# Patient Record
Sex: FEMALE | Race: WHITE | NOT HISPANIC OR LATINO | Employment: OTHER | URBAN - METROPOLITAN AREA
[De-identification: names, ages, dates, MRNs, and addresses within clinical notes are randomized per-mention and may not be internally consistent; named-entity substitution may affect disease eponyms.]

---

## 2018-08-03 ENCOUNTER — OFFICE VISIT (OUTPATIENT)
Dept: UROLOGY | Facility: CLINIC | Age: 53
End: 2018-08-03
Payer: MEDICARE

## 2018-08-03 VITALS
SYSTOLIC BLOOD PRESSURE: 108 MMHG | BODY MASS INDEX: 29.45 KG/M2 | DIASTOLIC BLOOD PRESSURE: 80 MMHG | HEIGHT: 60 IN | HEART RATE: 58 BPM | WEIGHT: 150 LBS

## 2018-08-03 DIAGNOSIS — R32 URINARY INCONTINENCE, UNSPECIFIED TYPE: ICD-10-CM

## 2018-08-03 DIAGNOSIS — N31.9 NEUROMUSCULAR DYSFUNCTION OF BLADDER: Primary | ICD-10-CM

## 2018-08-03 DIAGNOSIS — N39.0 RECURRENT UTI: ICD-10-CM

## 2018-08-03 PROCEDURE — 99204 OFFICE O/P NEW MOD 45 MIN: CPT | Performed by: UROLOGY

## 2018-08-03 RX ORDER — LEVOTHYROXINE SODIUM 0.15 MG/1
TABLET ORAL
Refills: 1 | COMMUNITY
Start: 2018-05-18 | End: 2020-04-30 | Stop reason: SDUPTHER

## 2018-08-03 RX ORDER — LANSOPRAZOLE 30 MG/1
30 CAPSULE, DELAYED RELEASE ORAL DAILY
Refills: 11 | COMMUNITY
Start: 2018-04-27 | End: 2020-06-18

## 2018-08-03 RX ORDER — MULTIVITAMIN WITH IRON
TABLET ORAL DAILY
COMMUNITY
End: 2021-09-13

## 2018-08-03 RX ORDER — ASCORBIC ACID 500 MG
2000 TABLET ORAL
COMMUNITY
End: 2021-09-13

## 2018-08-03 RX ORDER — NITROFURANTOIN MACROCRYSTALS 50 MG/1
50 CAPSULE ORAL
Qty: 90 CAPSULE | Refills: 3 | Status: SHIPPED | OUTPATIENT
Start: 2018-08-03 | End: 2020-04-20

## 2018-08-03 RX ORDER — ASPIRIN 81 MG/1
81 TABLET ORAL DAILY
COMMUNITY
End: 2020-04-20

## 2018-08-03 RX ORDER — LINACLOTIDE 145 UG/1
1 CAPSULE, GELATIN COATED ORAL DAILY
Refills: 11 | COMMUNITY
Start: 2018-04-27 | End: 2020-06-08

## 2018-08-03 NOTE — PROGRESS NOTES
Referring Physician: Selina Gomez MD  A copy of this note was sent to the referring physician  Diagnoses and all orders for this visit:    Neuromuscular dysfunction of bladder    Recurrent UTI  -     nitrofurantoin (MACRODANTIN) 50 mg capsule; Take 1 capsule (50 mg total) by mouth daily at bedtime    Urinary incontinence, unspecified type    Other orders  -     lansoprazole (PREVACID) 30 mg capsule; Take 30 mg by mouth daily  -     levothyroxine 150 mcg tablet; TAKE 1 TABLET(S) EVERY DAY BY ORAL ROUTE FOR 30 DAYS  -     LINZESS 145 MCG CAPS; Take 1 capsule by mouth daily  -     aspirin (ECOTRIN LOW STRENGTH) 81 mg EC tablet; Take 81 mg by mouth daily  -     Vitamin E 100 UNIT/GM CREA; Apply 2,000 Units topically  -     Garlic 5690 MG CAPS; Take by mouth  -     vitamin B-12 (CYANOCOBALAMIN) 50 MCG tablet; Take by mouth daily            Assessment and plan:       1  Recurrent uti  - on macrodantin suppression  - doing well    2  Neurogenic bladder secondary to developmental delay      Patient appears to be doing fairly well from a urologic perspective  She has been on Macrodantin suppression for number of years and this is working well for her  She only needed to be treated with a fluoroquinolone twice in the past calendar year for 3 day course and appeared to respond well  I have renewed her Macrodantin  Since it is difficult for the patient to be diagnosed with the urinary tract infection clinically we will hold off on any self start therapy though I did discuss this with Majo Roberson is mother who was clearly a from nominal caregiver    We will see her back in a year or sooner if needed      Bonnie Dutton MD      Chief Complaint     Transfer of care recurrent UTIs      History of Present Illness     Kassy Gutierres is a 47 y o  female with a history of developmental delay, neurogenic bladder recurrent urinary tract infection  She was previous under the care of Dr Prince Gomez for many years    I reviewed approximately 100 pages of medical records and summarized here    In brief she has a history of neurogenic bladder secondary to developmental delay  She is incontinent of urine  She has been evaluated with prior postvoid residual ultrasound, CT scans as well  He has not required any catheterization regimen  She has had trouble with recurrent cystitis  She also has what sounds like a history of asymptomatic bacteriuria  She has been on Macrodantin suppression for a number years and is doing very well  She had 2 symptomatic UTIs last year both of which were treated successfully with 3 days of ciprofloxacin  Detailed Urologic History     - please refer to HPI    Review of Systems     Review of Systems   Constitutional: Negative for activity change and fatigue  HENT: Negative for congestion  Eyes: Negative for visual disturbance  Respiratory: Negative for shortness of breath and wheezing  Cardiovascular: Negative for chest pain and leg swelling  Gastrointestinal: Negative for abdominal pain  Endocrine: Negative for polyuria  Genitourinary: Negative for dysuria, flank pain, hematuria and urgency  Musculoskeletal: Negative for back pain  Allergic/Immunologic: Negative for immunocompromised state  Neurological: Negative for dizziness and numbness  Psychiatric/Behavioral: Negative for dysphoric mood  All other systems reviewed and are negative  Allergies     Allergies   Allergen Reactions    Bethanechol      Reaction Date: 10Dec2007;     Metoclopramide      Reaction Date: 10Dec2007;     Sulfamethoxazole-Trimethoprim      Reaction Date: 10Dec2007;        Physical Exam     Physical Exam   Constitutional: She is oriented to person, place, and time  She appears well-developed  HENT:   Head: Normocephalic and atraumatic  Eyes: Pupils are equal, round, and reactive to light  Neck: Normal range of motion     Cardiovascular:   Negative lower extremity edema Pulmonary/Chest: Effort normal and breath sounds normal    Abdominal: Soft  Genitourinary:   Genitourinary Comments: Negative suprapubic tenderness, bladder nonpalpable   Musculoskeletal: Normal range of motion  Neurological: She is alert and oriented to person, place, and time  Skin: Skin is warm  Psychiatric: She has a normal mood and affect             Vital Signs  Vitals:    08/03/18 1037   BP: 108/80   BP Location: Right arm   Patient Position: Sitting   Cuff Size: Adult   Pulse: 58   Weight: 68 kg (150 lb)   Height: 5' (1 524 m)         Current Medications       Current Outpatient Prescriptions:     amLODIPine-valsartan (EXFORGE)  MG per tablet, Take by mouth daily, Disp: , Rfl:     aspirin (ECOTRIN LOW STRENGTH) 81 mg EC tablet, Take 81 mg by mouth daily, Disp: , Rfl:     Garlic 7392 MG CAPS, Take by mouth, Disp: , Rfl:     lansoprazole (PREVACID SOLUTAB) 30 mg disintegrating tablet, Take by mouth, Disp: , Rfl:     lansoprazole (PREVACID) 30 mg capsule, Take 30 mg by mouth daily, Disp: , Rfl: 11    levothyroxine 125 mcg tablet, Take 1 tablet by mouth daily, Disp: , Rfl:     levothyroxine 150 mcg tablet, TAKE 1 TABLET(S) EVERY DAY BY ORAL ROUTE FOR 30 DAYS , Disp: , Rfl: 1    LINZESS 145 MCG CAPS, Take 1 capsule by mouth daily, Disp: , Rfl: 11    metoprolol tartrate (LOPRESSOR) 25 mg tablet, Take by mouth daily, Disp: , Rfl:     montelukast (SINGULAIR) 10 mg tablet, Take by mouth daily, Disp: , Rfl:     nitrofurantoin (MACRODANTIN) 50 mg capsule, Take by mouth, Disp: , Rfl:     valsartan (DIOVAN) 320 MG tablet, Take 320 mg by mouth daily, Disp: , Rfl: 3    vitamin B-12 (CYANOCOBALAMIN) 50 MCG tablet, Take by mouth daily, Disp: , Rfl:     Vitamin E 100 UNIT/GM CREA, Apply 2,000 Units topically, Disp: , Rfl:       Active Problems     Patient Active Problem List   Diagnosis    Neuromuscular dysfunction of bladder         Past Medical History     Past Medical History:   Diagnosis Date  Blood pressure instability     Blood pressure instability UNKNOWN    Hypertension     Kidney stone     Stroke ([facility] [address] )     UNCERTAIN OF TYPE/NON VERBAL    Urinary incontinence     Urinary tract infection          Surgical History     Past Surgical History:   Procedure Laterality Date    ABDOMINAL HYSTERECTOMY      HYSTERECTOMY      HYSTERECTOMY      SMALL INTESTINE SURGERY           Family History     Family History   Problem Relation Age of Onset    Cancer Father     Colon cancer Father     Asthma Mother     Stroke Mother          Social History     Social History     History   Smoking Status    Never Smoker   Smokeless Tobacco    Never Used         Pertinent Lab Values     No results found for: CREATININE    No results found for: PSA    @RESULTRCNT(1H])@      Pertinent Imaging      - n/a    Portions of the record may have been created with voice recognition software   Occasional wrong word or "sound a like" substitutions may have occurred due to the inherent limitations of voice recognition software   Read the chart carefully and recognize, using context, where substitutions have occurred

## 2018-08-03 NOTE — LETTER
August 3, 2018     Nely Coleman MD  [address] [facility] [place] [address]    Patient: Kurt Kothari   YOB: 1965   Date of Visit: 8/3/2018       Dear Dr Desean Hebert: Thank you for referring Carlos Lopez to me for evaluation  Below are my notes for this consultation  If you have questions, please do not hesitate to call me  I look forward to following your patient along with you  Sincerely,        Neyda Rivero MD        CC: No Recipients  Neyda Rivero MD  8/3/2018 10:55 AM  Sign at close encounter  Referring Physician: Nely Coleman MD  A copy of this note was sent to the referring physician  Diagnoses and all orders for this visit:    Neuromuscular dysfunction of bladder    Recurrent UTI  -     nitrofurantoin (MACRODANTIN) 50 mg capsule; Take 1 capsule (50 mg total) by mouth daily at bedtime    Urinary incontinence, unspecified type    Other orders  -     lansoprazole (PREVACID) 30 mg capsule; Take 30 mg by mouth daily  -     levothyroxine 150 mcg tablet; TAKE 1 TABLET(S) EVERY DAY BY ORAL ROUTE FOR 30 DAYS  -     LINZESS 145 MCG CAPS; Take 1 capsule by mouth daily  -     aspirin (ECOTRIN LOW STRENGTH) 81 mg EC tablet; Take 81 mg by mouth daily  -     Vitamin E 100 UNIT/GM CREA; Apply 2,000 Units topically  -     Garlic 6584 MG CAPS; Take by mouth  -     vitamin B-12 (CYANOCOBALAMIN) 50 MCG tablet; Take by mouth daily            Assessment and plan:       1  Recurrent uti  - on macrodantin suppression  - doing well    2  Neurogenic bladder secondary to developmental delay      Patient appears to be doing fairly well from a urologic perspective  She has been on Macrodantin suppression for number of years and this is working well for her  She only needed to be treated with a fluoroquinolone twice in the past calendar year for 3 day course and appeared to respond well  I have renewed her Macrodantin    Since it is difficult for the patient to be diagnosed with the urinary tract infection clinically we will hold off on any self start therapy though I did discuss this with Kary Res is mother who was clearly a from nominal caregiver    We will see her back in a year or sooner if needed      Nicanor Melendez MD      Chief Complaint     Transfer of care recurrent UTIs      History of Present Illness     Wisam Alas is a 47 y o  female with a history of developmental delay, neurogenic bladder recurrent urinary tract infection  She was previous under the care of Dr Gracie Rodarte for many years  I reviewed approximately 100 pages of medical records and summarized here    In brief she has a history of neurogenic bladder secondary to developmental delay  She is incontinent of urine  She has been evaluated with prior postvoid residual ultrasound, CT scans as well  He has not required any catheterization regimen  She has had trouble with recurrent cystitis  She also has what sounds like a history of asymptomatic bacteriuria  She has been on Macrodantin suppression for a number years and is doing very well  She had 2 symptomatic UTIs last year both of which were treated successfully with 3 days of ciprofloxacin  Detailed Urologic History     - please refer to HPI    Review of Systems     Review of Systems   Constitutional: Negative for activity change and fatigue  HENT: Negative for congestion  Eyes: Negative for visual disturbance  Respiratory: Negative for shortness of breath and wheezing  Cardiovascular: Negative for chest pain and leg swelling  Gastrointestinal: Negative for abdominal pain  Endocrine: Negative for polyuria  Genitourinary: Negative for dysuria, flank pain, hematuria and urgency  Musculoskeletal: Negative for back pain  Allergic/Immunologic: Negative for immunocompromised state  Neurological: Negative for dizziness and numbness  Psychiatric/Behavioral: Negative for dysphoric mood     All other systems reviewed and are negative  Allergies     Allergies   Allergen Reactions    Bethanechol      Reaction Date: 10Dec2007;     Metoclopramide      Reaction Date: 10Dec2007;     Sulfamethoxazole-Trimethoprim      Reaction Date: 10Dec2007;        Physical Exam     Physical Exam   Constitutional: She is oriented to person, place, and time  She appears well-developed  HENT:   Head: Normocephalic and atraumatic  Eyes: Pupils are equal, round, and reactive to light  Neck: Normal range of motion  Cardiovascular:   Negative lower extremity edema   Pulmonary/Chest: Effort normal and breath sounds normal    Abdominal: Soft  Genitourinary:   Genitourinary Comments: Negative suprapubic tenderness, bladder nonpalpable   Musculoskeletal: Normal range of motion  Neurological: She is alert and oriented to person, place, and time  Skin: Skin is warm  Psychiatric: She has a normal mood and affect             Vital Signs  Vitals:    08/03/18 1037   BP: 108/80   BP Location: Right arm   Patient Position: Sitting   Cuff Size: Adult   Pulse: 58   Weight: 68 kg (150 lb)   Height: 5' (1 524 m)         Current Medications       Current Outpatient Prescriptions:     amLODIPine-valsartan (EXFORGE)  MG per tablet, Take by mouth daily, Disp: , Rfl:     aspirin (ECOTRIN LOW STRENGTH) 81 mg EC tablet, Take 81 mg by mouth daily, Disp: , Rfl:     Garlic 3314 MG CAPS, Take by mouth, Disp: , Rfl:     lansoprazole (PREVACID SOLUTAB) 30 mg disintegrating tablet, Take by mouth, Disp: , Rfl:     lansoprazole (PREVACID) 30 mg capsule, Take 30 mg by mouth daily, Disp: , Rfl: 11    levothyroxine 125 mcg tablet, Take 1 tablet by mouth daily, Disp: , Rfl:     levothyroxine 150 mcg tablet, TAKE 1 TABLET(S) EVERY DAY BY ORAL ROUTE FOR 30 DAYS , Disp: , Rfl: 1    LINZESS 145 MCG CAPS, Take 1 capsule by mouth daily, Disp: , Rfl: 11    metoprolol tartrate (LOPRESSOR) 25 mg tablet, Take by mouth daily, Disp: , Rfl:     montelukast (SINGULAIR) 10 mg tablet, Take by mouth daily, Disp: , Rfl:     nitrofurantoin (MACRODANTIN) 50 mg capsule, Take by mouth, Disp: , Rfl:     valsartan (DIOVAN) 320 MG tablet, Take 320 mg by mouth daily, Disp: , Rfl: 3    vitamin B-12 (CYANOCOBALAMIN) 50 MCG tablet, Take by mouth daily, Disp: , Rfl:     Vitamin E 100 UNIT/GM CREA, Apply 2,000 Units topically, Disp: , Rfl:       Active Problems     Patient Active Problem List   Diagnosis    Neuromuscular dysfunction of bladder         Past Medical History     Past Medical History:   Diagnosis Date    Blood pressure instability     Blood pressure instability UNKNOWN    Hypertension     Kidney stone     Stroke ([facility] [address] )     UNCERTAIN OF TYPE/NON VERBAL    Urinary incontinence     Urinary tract infection          Surgical History     Past Surgical History:   Procedure Laterality Date    ABDOMINAL HYSTERECTOMY      HYSTERECTOMY      HYSTERECTOMY      SMALL INTESTINE SURGERY           Family History     Family History   Problem Relation Age of Onset    Cancer Father     Colon cancer Father     Asthma Mother     Stroke Mother          Social History     Social History     History   Smoking Status    Never Smoker   Smokeless Tobacco    Never Used         Pertinent Lab Values     No results found for: CREATININE    No results found for: PSA    @RESULTRCNT(1H])@      Pertinent Imaging      - n/a    Portions of the record may have been created with voice recognition software   Occasional wrong word or "sound a like" substitutions may have occurred due to the inherent limitations of voice recognition software   Read the chart carefully and recognize, using context, where substitutions have occurred

## 2018-09-11 ENCOUNTER — TELEPHONE (OUTPATIENT)
Dept: UROLOGY | Facility: CLINIC | Age: 53
End: 2018-09-11

## 2018-09-11 NOTE — TELEPHONE ENCOUNTER
Returned call from mother, Charles Louis, who cares for patient,  Patient managed by Dr Raeann Murphy office  History of neurogenic bladder secondary to developmental delay, recurrent UTI  Mom states patient's daily Nitrofurantoin 50 mg needs prior authorization with [facility] phone [phone] with rational for antibiotic and diagnosis  Prescription is filled at [address]  Advised mother that message will be sent to our prior authorization and that she will be contacted after response from insurance

## 2018-09-13 NOTE — TELEPHONE ENCOUNTER
Iván Iglesias  This patients medication was approved with the insurance good until 9/12/19, I will call the mother to let her know    Thanks  Damien Mccall

## 2020-03-16 ENCOUNTER — OFFICE VISIT (OUTPATIENT)
Dept: OBGYN CLINIC | Facility: MEDICAL CENTER | Age: 55
End: 2020-03-16
Payer: MEDICARE

## 2020-03-16 VITALS
BODY MASS INDEX: 26.9 KG/M2 | WEIGHT: 137 LBS | HEIGHT: 60 IN | DIASTOLIC BLOOD PRESSURE: 74 MMHG | SYSTOLIC BLOOD PRESSURE: 120 MMHG

## 2020-03-16 DIAGNOSIS — N76.0 ACUTE VAGINITIS: Primary | ICD-10-CM

## 2020-03-16 PROCEDURE — 87210 SMEAR WET MOUNT SALINE/INK: CPT | Performed by: NURSE PRACTITIONER

## 2020-03-16 PROCEDURE — 99214 OFFICE O/P EST MOD 30 MIN: CPT | Performed by: NURSE PRACTITIONER

## 2020-03-16 RX ORDER — CLOTRIMAZOLE AND BETAMETHASONE DIPROPIONATE 10; .64 MG/G; MG/G
CREAM TOPICAL 2 TIMES DAILY
Qty: 30 G | Refills: 0 | Status: SHIPPED | OUTPATIENT
Start: 2020-03-16 | End: 2020-06-19

## 2020-03-16 RX ORDER — FLUCONAZOLE 150 MG/1
TABLET ORAL
Qty: 1 TABLET | Refills: 0 | Status: SHIPPED | OUTPATIENT
Start: 2020-03-16 | End: 2020-03-16

## 2020-03-16 NOTE — PATIENT INSTRUCTIONS
Vaginitis   WHAT YOU NEED TO KNOW:   What is vaginitis? Vaginitis is an inflammation or infection of the vagina  What causes vaginitis? Vaginitis is usually caused by bacteria, a virus, or a fungus  The chemicals in bubble baths, soaps, and perfumes can also cause vaginitis  A foreign body inside the vagina can also cause vaginitis  The infection may spread through sexual activity  It can also pass to a baby during birth  What increases my risk for vaginitis? · Diabetes mellitus that is not controlled    · Antibiotics, such as those used to treat fungal vaginitis     · Weakened immune system    · High estrogen levels, such as during pregnancy or from birth control pills    · Smoking    · New or multiple sex partners     · Sexual abuse    · Incorrect care of vagina, such as not wiping from front to back    · Use of spermicide or douche  What are the signs and symptoms of vaginitis? · Tenderness, itching, redness, and swelling     · Foul-smelling odor     · Thick, curd-like discharge     · Thin, gray-white discharge    · Small skin tears or chafing    · Painful sexual intercourse    · Pain when you urinate  How is vaginitis diagnosed? Your healthcare provider will ask about your signs and symptoms and examine you  A sample of discharge from your vagina will be tested for infection  How is vaginitis treated? · Antifungals  are used to treat a fungal infection  They may be given as a cream, gel, or tablet you insert into your vagina  · Antibiotics  are used to fight an infection caused by bacteria  What are the risks of vaginitis? Your infection may return  Left untreated, the bacteria or virus can spread  This can damage organs, such as your fallopian tubes  The infection can spread to your sexual partner if you do not have safe sex  The infection may lead to pregnancy complications, such as  birth or pelvic inflammatory disease  How can I manage my vaginitis?    · Wash your vagina  with mild soap and warm water each day  Gently dry the area after washing  · Do not douche  or insert other irritating products into your vagina  · Do not wear  tight-fitting clothes or undergarments  These can make your symptoms worse  · Do not have sex until your symptoms go away  When you have sex, always use a condom  Condoms can help protect you from contact with fluids from your partner that may be causing your vaginitis  How can I prevent vaginitis? · Wipe from front to back  after you urinate  · Do not use irritating products such as bubble baths or perfumed soaps  When should I contact my healthcare provider? · You have a fever  · You have abdominal pain  · Your symptoms get worse, even after treatment  · Your symptoms return  · You have questions or concerns about your condition or care  When should I seek immediate care? · You have unusual vaginal bleeding  · You have severe abdominal pain  CARE AGREEMENT:   You have the right to help plan your care  Learn about your health condition and how it may be treated  Discuss treatment options with your caregivers to decide what care you want to receive  You always have the right to refuse treatment  The above information is an  only  It is not intended as medical advice for individual conditions or treatments  Talk to your doctor, nurse or pharmacist before following any medical regimen to see if it is safe and effective for you  © 2017 [address] Information is for End User's use only and may not be sold, redistributed or otherwise used for commercial purposes  All illustrations and images included in CareNotes® are the copyrighted property of A D A M , Inc  or [employer]

## 2020-03-17 PROBLEM — N76.0 ACUTE VAGINITIS: Status: ACTIVE | Noted: 2020-03-17

## 2020-03-17 LAB
BV WHIFF TEST VAG QL: NEGATIVE
CLUE CELLS SPEC QL WET PREP: NEGATIVE
PH SMN: 4 [PH]
SL AMB POCT WET MOUNT: ABNORMAL
T VAGINALIS VAG QL WET PREP: NEGATIVE
YEAST VAG QL WET PREP: POSITIVE

## 2020-03-17 NOTE — ASSESSMENT & PLAN NOTE
Exam consistent with resolving vaginitis-candidiasis  Recommended lostrisone cream, conservative vulvar hygiene, leaving area open to air and diflucan tablet if does not resolve  Discussed common triggers and what to avoid with her mom  Reviewed sx to report and reasons to call

## 2020-03-17 NOTE — PROGRESS NOTES
Assessment/Plan:    Acute vaginitis  Exam consistent with resolving vaginitis-candidiasis  Recommended lostrisone cream, conservative vulvar hygiene, leaving area open to air and diflucan tablet if does not resolve  Discussed common triggers and what to avoid with her mom  Reviewed sx to report and reasons to call  Advised mom that her treatment appears to be working  She should use mild soap and water only-no scrubbing of skin and to leave open to air when possible  Diagnoses and all orders for this visit:    Acute vaginitis  -     POCT wet mount  -     clotrimazole-betamethasone (LOTRISONE) 1-0 05 % cream; Apply topically 2 (two) times a day  -     fluconazole (DIFLUCAN) 150 mg tablet; Take one tablet po today and repeat 3 days        Subjective:      Patient ID: Gael Vieyra is a 53 y o  female  Margarita Alfredo is a 53year old nonverbal patient who is brought in by her mother for evaluation of external redness, pain and bleeding of skin  She presents in a wheelchair  She used adult diapers and mom uses plastic pants on top  Mom states she was recently seen at urology for straight cath and when they obtained the nurse notices that she was very red and it looks like she had a yeast infection  Per mom, Margarita Alfredo has never been sexually active and she does not notice any unusual discharge  Mom notices some bleeding from the skin  Mom does report that Margarita Alfredo appeared uncomfortable with urination  She was treated for UTI  She began using OTC monistat and Desitin with improvement of the redness  Has not noticed bleeding from skin in a couple of days          The following portions of the patient's history were reviewed and updated as appropriate: allergies, current medications, past family history, past medical history, past social history, past surgical history and problem list     Review of Systems   Unable to perform ROS: Patient nonverbal         Objective:      /74 (BP Location: Right arm, Patient Position: Sitting, Cuff Size: Standard)   Ht 5' (1 524 m)   Wt 62 1 kg (137 lb)   BMI 26 76 kg/m²          Physical Exam   Genitourinary: There is no rash, tenderness or lesion on the right labia  There is no rash, tenderness or lesion on the left labia  Genitourinary Comments: Wet prep done via single Q tip in vagina  External genitalia with slight redness, no lesions or bleeding    Skin: Skin is warm and dry  Capillary refill takes less than 2 seconds         Wet prep + yeast, neg clue, trich, whiff Ph 4 0

## 2020-04-06 ENCOUNTER — TELEMEDICINE (OUTPATIENT)
Dept: FAMILY MEDICINE CLINIC | Facility: CLINIC | Age: 55
End: 2020-04-06
Payer: MEDICARE

## 2020-04-06 DIAGNOSIS — M54.50 LOW BACK PAIN WITHOUT SCIATICA, UNSPECIFIED BACK PAIN LATERALITY, UNSPECIFIED CHRONICITY: Primary | ICD-10-CM

## 2020-04-06 PROCEDURE — 99213 OFFICE O/P EST LOW 20 MIN: CPT | Performed by: FAMILY MEDICINE

## 2020-04-06 RX ORDER — CYCLOBENZAPRINE HCL 5 MG
5 TABLET ORAL 3 TIMES DAILY PRN
Qty: 30 TABLET | Refills: 1 | Status: SHIPPED | OUTPATIENT
Start: 2020-04-06 | End: 2020-04-20

## 2020-04-10 ENCOUNTER — TELEMEDICINE (OUTPATIENT)
Dept: FAMILY MEDICINE CLINIC | Facility: CLINIC | Age: 55
End: 2020-04-10
Payer: MEDICARE

## 2020-04-10 DIAGNOSIS — M54.50 ACUTE LOW BACK PAIN WITHOUT SCIATICA, UNSPECIFIED BACK PAIN LATERALITY: Primary | ICD-10-CM

## 2020-04-10 PROCEDURE — 99441 PR PHYS/QHP TELEPHONE EVALUATION 5-10 MIN: CPT | Performed by: FAMILY MEDICINE

## 2020-04-10 RX ORDER — CELECOXIB 100 MG/1
CAPSULE ORAL
Qty: 60 CAPSULE | Refills: 1 | Status: SHIPPED | OUTPATIENT
Start: 2020-04-10 | End: 2020-06-19

## 2020-04-13 ENCOUNTER — TELEPHONE (OUTPATIENT)
Dept: FAMILY MEDICINE CLINIC | Facility: CLINIC | Age: 55
End: 2020-04-13

## 2020-04-20 ENCOUNTER — TELEPHONE (OUTPATIENT)
Dept: FAMILY MEDICINE CLINIC | Facility: CLINIC | Age: 55
End: 2020-04-20

## 2020-04-20 RX ORDER — LIDOCAINE 50 MG/G
OINTMENT TOPICAL
COMMUNITY
End: 2021-09-13

## 2020-04-20 RX ORDER — NITROFURANTOIN MACROCRYSTALS 100 MG/1
100 CAPSULE ORAL 4 TIMES DAILY
COMMUNITY
End: 2021-09-13

## 2020-04-20 RX ORDER — GATIFLOXACIN 5 MG/ML
SOLUTION/ DROPS OPHTHALMIC
COMMUNITY
End: 2020-06-19

## 2020-04-20 RX ORDER — OFLOXACIN 3 MG/ML
SOLUTION AURICULAR (OTIC)
COMMUNITY
Start: 2020-03-12 | End: 2020-06-19

## 2020-04-20 RX ORDER — METHOCARBAMOL 500 MG/1
500 TABLET, FILM COATED ORAL EVERY 6 HOURS PRN
COMMUNITY
Start: 2020-04-17 | End: 2022-01-26 | Stop reason: ALTCHOICE

## 2020-04-20 RX ORDER — SENNA AND DOCUSATE SODIUM 50; 8.6 MG/1; MG/1
1 TABLET, FILM COATED ORAL
COMMUNITY
Start: 2019-12-06 | End: 2021-09-20 | Stop reason: ALTCHOICE

## 2020-04-20 RX ORDER — AMLODIPINE BESYLATE 2.5 MG/1
TABLET ORAL
COMMUNITY
Start: 2020-03-05 | End: 2021-09-13

## 2020-04-22 ENCOUNTER — TELEMEDICINE (OUTPATIENT)
Dept: FAMILY MEDICINE CLINIC | Facility: CLINIC | Age: 55
End: 2020-04-22
Payer: MEDICARE

## 2020-04-22 DIAGNOSIS — M54.9 BACK PAIN, UNSPECIFIED BACK LOCATION, UNSPECIFIED BACK PAIN LATERALITY, UNSPECIFIED CHRONICITY: Primary | ICD-10-CM

## 2020-04-22 PROCEDURE — 99213 OFFICE O/P EST LOW 20 MIN: CPT | Performed by: FAMILY MEDICINE

## 2020-04-30 DIAGNOSIS — E03.9 HYPOTHYROIDISM, UNSPECIFIED TYPE: Primary | ICD-10-CM

## 2020-04-30 RX ORDER — LEVOTHYROXINE SODIUM 0.15 MG/1
150 TABLET ORAL DAILY
Qty: 90 TABLET | Refills: 1 | Status: SHIPPED | OUTPATIENT
Start: 2020-04-30 | End: 2022-07-11

## 2020-05-07 DIAGNOSIS — I10 ESSENTIAL HYPERTENSION: Primary | ICD-10-CM

## 2020-05-07 RX ORDER — VALSARTAN 320 MG/1
320 TABLET ORAL DAILY
Qty: 30 TABLET | Refills: 3 | Status: SHIPPED | OUTPATIENT
Start: 2020-05-07 | End: 2020-07-31

## 2020-05-13 ENCOUNTER — TELEPHONE (OUTPATIENT)
Dept: FAMILY MEDICINE CLINIC | Facility: CLINIC | Age: 55
End: 2020-05-13

## 2020-05-14 DIAGNOSIS — L03.90 CELLULITIS, UNSPECIFIED CELLULITIS SITE: Primary | ICD-10-CM

## 2020-05-14 RX ORDER — CEPHALEXIN 500 MG/1
500 CAPSULE ORAL EVERY 6 HOURS SCHEDULED
Qty: 40 CAPSULE | Refills: 0 | Status: SHIPPED | OUTPATIENT
Start: 2020-05-14 | End: 2020-05-24

## 2020-05-27 ENCOUNTER — TELEPHONE (OUTPATIENT)
Dept: OBGYN CLINIC | Facility: HOSPITAL | Age: 55
End: 2020-05-27

## 2020-05-29 ENCOUNTER — TELEPHONE (OUTPATIENT)
Dept: OBGYN CLINIC | Facility: HOSPITAL | Age: 55
End: 2020-05-29

## 2020-06-03 ENCOUNTER — OFFICE VISIT (OUTPATIENT)
Dept: OBGYN CLINIC | Facility: CLINIC | Age: 55
End: 2020-06-03
Payer: MEDICARE

## 2020-06-03 VITALS
HEIGHT: 60 IN | BODY MASS INDEX: 26.9 KG/M2 | DIASTOLIC BLOOD PRESSURE: 94 MMHG | HEART RATE: 114 BPM | WEIGHT: 137 LBS | SYSTOLIC BLOOD PRESSURE: 142 MMHG

## 2020-06-03 DIAGNOSIS — S30.0XXA HEMATOMA OF LUMBAR MUSCLE, INITIAL ENCOUNTER: Primary | ICD-10-CM

## 2020-06-03 PROCEDURE — 99203 OFFICE O/P NEW LOW 30 MIN: CPT | Performed by: ORTHOPAEDIC SURGERY

## 2020-06-07 DIAGNOSIS — K59.00 CONSTIPATION, UNSPECIFIED CONSTIPATION TYPE: Primary | ICD-10-CM

## 2020-06-08 RX ORDER — LINACLOTIDE 145 UG/1
CAPSULE, GELATIN COATED ORAL
Qty: 90 CAPSULE | Refills: 1 | Status: SHIPPED | OUTPATIENT
Start: 2020-06-08 | End: 2020-12-20

## 2020-06-09 ENCOUNTER — TELEPHONE (OUTPATIENT)
Dept: RADIOLOGY | Facility: HOSPITAL | Age: 55
End: 2020-06-09

## 2020-06-09 RX ORDER — SODIUM CHLORIDE 9 MG/ML
75 INJECTION, SOLUTION INTRAVENOUS CONTINUOUS
Status: CANCELLED | OUTPATIENT
Start: 2020-06-09

## 2020-06-11 ENCOUNTER — TELEPHONE (OUTPATIENT)
Dept: SURGERY | Facility: HOSPITAL | Age: 55
End: 2020-06-11

## 2020-06-12 ENCOUNTER — ANESTHESIA (OUTPATIENT)
Dept: RADIOLOGY | Facility: HOSPITAL | Age: 55
End: 2020-06-12

## 2020-06-12 ENCOUNTER — ANESTHESIA EVENT (OUTPATIENT)
Dept: RADIOLOGY | Facility: HOSPITAL | Age: 55
End: 2020-06-12

## 2020-06-12 ENCOUNTER — ANESTHESIA (OUTPATIENT)
Dept: ANESTHESIOLOGY | Facility: HOSPITAL | Age: 55
End: 2020-06-12

## 2020-06-12 ENCOUNTER — ANESTHESIA EVENT (OUTPATIENT)
Dept: ANESTHESIOLOGY | Facility: HOSPITAL | Age: 55
End: 2020-06-12

## 2020-06-12 ENCOUNTER — HOSPITAL ENCOUNTER (OUTPATIENT)
Dept: RADIOLOGY | Facility: HOSPITAL | Age: 55
Discharge: HOME | End: 2020-06-12
Payer: MEDICARE

## 2020-06-12 VITALS
HEIGHT: 60 IN | WEIGHT: 127 LBS | SYSTOLIC BLOOD PRESSURE: 123 MMHG | DIASTOLIC BLOOD PRESSURE: 61 MMHG | HEART RATE: 67 BPM | BODY MASS INDEX: 24.94 KG/M2 | OXYGEN SATURATION: 100 % | TEMPERATURE: 97.9 F | RESPIRATION RATE: 16 BRPM

## 2020-06-12 DIAGNOSIS — S30.0XXA HEMATOMA OF LUMBAR MUSCLE, INITIAL ENCOUNTER: ICD-10-CM

## 2020-06-12 LAB — SARS-COV-2 RNA RESP QL NAA+PROBE: NEGATIVE

## 2020-06-12 PROCEDURE — 87102 FUNGUS ISOLATION CULTURE: CPT | Performed by: ORTHOPAEDIC SURGERY

## 2020-06-12 PROCEDURE — 87070 CULTURE OTHR SPECIMN AEROBIC: CPT | Performed by: ORTHOPAEDIC SURGERY

## 2020-06-12 PROCEDURE — C1769 GUIDE WIRE: HCPCS

## 2020-06-12 PROCEDURE — 87635 SARS-COV-2 COVID-19 AMP PRB: CPT | Performed by: ANESTHESIOLOGY

## 2020-06-12 PROCEDURE — C1729 CATH, DRAINAGE: HCPCS

## 2020-06-12 PROCEDURE — 10030 IMG GID FLU COLL DRG SFT TIS: CPT | Performed by: RADIOLOGY

## 2020-06-12 PROCEDURE — 10030 IMG GID FLU COLL DRG SFT TIS: CPT

## 2020-06-12 PROCEDURE — 87075 CULTR BACTERIA EXCEPT BLOOD: CPT | Performed by: ORTHOPAEDIC SURGERY

## 2020-06-12 PROCEDURE — 87205 SMEAR GRAM STAIN: CPT | Performed by: ORTHOPAEDIC SURGERY

## 2020-06-12 RX ORDER — DULOXETIN HYDROCHLORIDE 20 MG/1
20 CAPSULE, DELAYED RELEASE ORAL DAILY
COMMUNITY
End: 2021-09-13

## 2020-06-12 RX ORDER — PROPOFOL 10 MG/ML
INJECTION, EMULSION INTRAVENOUS AS NEEDED
Status: DISCONTINUED | OUTPATIENT
Start: 2020-06-12 | End: 2020-06-12 | Stop reason: SURG

## 2020-06-12 RX ORDER — MULTIVITAMIN WITH IRON
100 TABLET ORAL DAILY
COMMUNITY
End: 2021-09-13

## 2020-06-12 RX ORDER — SODIUM CHLORIDE 9 MG/ML
75 INJECTION, SOLUTION INTRAVENOUS CONTINUOUS
Status: DISCONTINUED | OUTPATIENT
Start: 2020-06-12 | End: 2020-06-13 | Stop reason: HOSPADM

## 2020-06-12 RX ORDER — MELOXICAM 7.5 MG/1
7.5 TABLET ORAL DAILY
COMMUNITY
End: 2021-09-20 | Stop reason: SDUPTHER

## 2020-06-12 RX ORDER — PROPOFOL 10 MG/ML
INJECTION, EMULSION INTRAVENOUS CONTINUOUS PRN
Status: DISCONTINUED | OUTPATIENT
Start: 2020-06-12 | End: 2020-06-12 | Stop reason: SURG

## 2020-06-12 RX ORDER — EPHEDRINE SULFATE 50 MG/ML
INJECTION INTRAVENOUS AS NEEDED
Status: DISCONTINUED | OUTPATIENT
Start: 2020-06-12 | End: 2020-06-12 | Stop reason: SURG

## 2020-06-12 RX ADMIN — SODIUM CHLORIDE: 0.9 INJECTION, SOLUTION INTRAVENOUS at 09:58

## 2020-06-12 RX ADMIN — PROPOFOL 50 MG: 10 INJECTION, EMULSION INTRAVENOUS at 10:18

## 2020-06-12 RX ADMIN — PROPOFOL 50 MCG/KG/MIN: 10 INJECTION, EMULSION INTRAVENOUS at 10:21

## 2020-06-12 RX ADMIN — EPHEDRINE SULFATE 15 MG: 50 INJECTION, SOLUTION INTRAVENOUS at 10:37

## 2020-06-12 RX ADMIN — EPHEDRINE SULFATE 10 MG: 50 INJECTION, SOLUTION INTRAVENOUS at 10:33

## 2020-06-12 RX ADMIN — EPHEDRINE SULFATE 10 MG: 50 INJECTION, SOLUTION INTRAVENOUS at 10:28

## 2020-06-15 LAB
BACTERIA SPEC ANAEROBE CULT: NO GROWTH
BACTERIA SPEC BFLD CULT: NO GROWTH
GRAM STN SPEC: NORMAL

## 2020-06-17 ENCOUNTER — TELEPHONE (OUTPATIENT)
Dept: OBGYN CLINIC | Facility: HOSPITAL | Age: 55
End: 2020-06-17

## 2020-06-18 ENCOUNTER — TELEPHONE (OUTPATIENT)
Dept: SURGERY | Facility: HOSPITAL | Age: 55
End: 2020-06-18

## 2020-06-18 ENCOUNTER — TELEPHONE (OUTPATIENT)
Dept: RADIOLOGY | Facility: HOSPITAL | Age: 55
End: 2020-06-18

## 2020-06-18 DIAGNOSIS — K21.9 GASTROESOPHAGEAL REFLUX DISEASE WITHOUT ESOPHAGITIS: Primary | ICD-10-CM

## 2020-06-18 RX ORDER — LANSOPRAZOLE 30 MG/1
CAPSULE, DELAYED RELEASE ORAL
Qty: 90 CAPSULE | Refills: 3 | Status: SHIPPED | OUTPATIENT
Start: 2020-06-18

## 2020-06-19 ENCOUNTER — HOSPITAL ENCOUNTER (OUTPATIENT)
Dept: RADIOLOGY | Facility: HOSPITAL | Age: 55
Discharge: HOME | End: 2020-06-19
Payer: MEDICARE

## 2020-06-19 VITALS
RESPIRATION RATE: 16 BRPM | HEIGHT: 60 IN | HEART RATE: 66 BPM | OXYGEN SATURATION: 99 % | WEIGHT: 127 LBS | DIASTOLIC BLOOD PRESSURE: 75 MMHG | TEMPERATURE: 97.2 F | BODY MASS INDEX: 24.94 KG/M2 | SYSTOLIC BLOOD PRESSURE: 120 MMHG

## 2020-06-19 DIAGNOSIS — L02.91 ABSCESS: ICD-10-CM

## 2020-06-19 PROCEDURE — 76999 ECHO EXAMINATION PROCEDURE: CPT | Performed by: RADIOLOGY

## 2020-06-19 PROCEDURE — 76080 X-RAY EXAM OF FISTULA: CPT

## 2020-06-19 PROCEDURE — 49424 ASSESS CYST CONTRAST INJECT: CPT

## 2020-07-13 LAB — FUNGUS SPEC CULT: NORMAL

## 2020-07-31 DIAGNOSIS — I10 ESSENTIAL HYPERTENSION: ICD-10-CM

## 2020-07-31 RX ORDER — VALSARTAN 320 MG/1
TABLET ORAL
Qty: 90 TABLET | Refills: 1 | Status: SHIPPED | OUTPATIENT
Start: 2020-07-31 | End: 2021-10-14 | Stop reason: ALTCHOICE

## 2020-12-18 DIAGNOSIS — K59.00 CONSTIPATION, UNSPECIFIED CONSTIPATION TYPE: ICD-10-CM

## 2020-12-20 RX ORDER — LINACLOTIDE 145 UG/1
CAPSULE, GELATIN COATED ORAL
Qty: 90 CAPSULE | Refills: 1 | Status: SHIPPED | OUTPATIENT
Start: 2020-12-20

## 2021-05-02 DIAGNOSIS — E03.9 HYPOTHYROIDISM, UNSPECIFIED TYPE: ICD-10-CM

## 2021-05-02 RX ORDER — LEVOTHYROXINE SODIUM 0.15 MG/1
TABLET ORAL
Qty: 90 TABLET | Refills: 1 | OUTPATIENT
Start: 2021-05-02

## 2021-08-21 ENCOUNTER — HOSPITAL ENCOUNTER (EMERGENCY)
Facility: HOSPITAL | Age: 56
Discharge: HOME | End: 2021-08-21
Attending: EMERGENCY MEDICINE | Admitting: EMERGENCY MEDICINE
Payer: MEDICARE

## 2021-08-21 ENCOUNTER — APPOINTMENT (EMERGENCY)
Dept: RADIOLOGY | Facility: HOSPITAL | Age: 56
End: 2021-08-21
Payer: MEDICARE

## 2021-08-21 VITALS
BODY MASS INDEX: 26.76 KG/M2 | TEMPERATURE: 97.2 F | WEIGHT: 137 LBS | OXYGEN SATURATION: 96 % | DIASTOLIC BLOOD PRESSURE: 79 MMHG | HEART RATE: 92 BPM | RESPIRATION RATE: 18 BRPM | SYSTOLIC BLOOD PRESSURE: 121 MMHG

## 2021-08-21 DIAGNOSIS — R41.82 ALTERED MENTAL STATUS, UNSPECIFIED ALTERED MENTAL STATUS TYPE: ICD-10-CM

## 2021-08-21 DIAGNOSIS — K59.09 CHRONIC CONSTIPATION: Primary | ICD-10-CM

## 2021-08-21 LAB
ALBUMIN SERPL BCP-MCNC: 3.7 G/DL (ref 3.5–5)
ALP SERPL-CCNC: 88 U/L (ref 46–116)
ALT SERPL W P-5'-P-CCNC: 35 U/L (ref 12–78)
ANION GAP SERPL CALCULATED.3IONS-SCNC: 2 MMOL/L (ref 4–13)
AST SERPL W P-5'-P-CCNC: 25 U/L (ref 5–45)
BACTERIA UR QL AUTO: ABNORMAL /HPF
BASOPHILS # BLD AUTO: 0.04 THOUSANDS/ΜL (ref 0–0.1)
BASOPHILS NFR BLD AUTO: 1 % (ref 0–1)
BILIRUB SERPL-MCNC: 0.32 MG/DL (ref 0.2–1)
BILIRUB UR QL STRIP: NEGATIVE
BUN SERPL-MCNC: 23 MG/DL (ref 5–25)
CALCIUM SERPL-MCNC: 9.1 MG/DL (ref 8.3–10.1)
CHLORIDE SERPL-SCNC: 108 MMOL/L (ref 100–108)
CLARITY UR: CLEAR
CO2 SERPL-SCNC: 26 MMOL/L (ref 21–32)
COLOR UR: YELLOW
CREAT SERPL-MCNC: 0.76 MG/DL (ref 0.6–1.3)
EOSINOPHIL # BLD AUTO: 0.21 THOUSAND/ΜL (ref 0–0.61)
EOSINOPHIL NFR BLD AUTO: 4 % (ref 0–6)
ERYTHROCYTE [DISTWIDTH] IN BLOOD BY AUTOMATED COUNT: 13.3 % (ref 11.6–15.1)
GFR SERPL CREATININE-BSD FRML MDRD: 88 ML/MIN/1.73SQ M
GLUCOSE SERPL-MCNC: 87 MG/DL (ref 65–140)
GLUCOSE UR STRIP-MCNC: NEGATIVE MG/DL
HCT VFR BLD AUTO: 36.3 % (ref 34.8–46.1)
HGB BLD-MCNC: 11.5 G/DL (ref 11.5–15.4)
HGB UR QL STRIP.AUTO: ABNORMAL
HYALINE CASTS #/AREA URNS LPF: ABNORMAL /LPF
IMM GRANULOCYTES # BLD AUTO: 0.01 THOUSAND/UL (ref 0–0.2)
IMM GRANULOCYTES NFR BLD AUTO: 0 % (ref 0–2)
KETONES UR STRIP-MCNC: NEGATIVE MG/DL
LEUKOCYTE ESTERASE UR QL STRIP: NEGATIVE
LYMPHOCYTES # BLD AUTO: 2.22 THOUSANDS/ΜL (ref 0.6–4.47)
LYMPHOCYTES NFR BLD AUTO: 39 % (ref 14–44)
MCH RBC QN AUTO: 29 PG (ref 26.8–34.3)
MCHC RBC AUTO-ENTMCNC: 31.7 G/DL (ref 31.4–37.4)
MCV RBC AUTO: 91 FL (ref 82–98)
MONOCYTES # BLD AUTO: 0.35 THOUSAND/ΜL (ref 0.17–1.22)
MONOCYTES NFR BLD AUTO: 6 % (ref 4–12)
NEUTROPHILS # BLD AUTO: 2.86 THOUSANDS/ΜL (ref 1.85–7.62)
NEUTS SEG NFR BLD AUTO: 50 % (ref 43–75)
NITRITE UR QL STRIP: NEGATIVE
NON-SQ EPI CELLS URNS QL MICRO: ABNORMAL /HPF
NRBC BLD AUTO-RTO: 0 /100 WBCS
PH UR STRIP.AUTO: 7.5 [PH] (ref 4.5–8)
PLATELET # BLD AUTO: 243 THOUSANDS/UL (ref 149–390)
PMV BLD AUTO: 10.2 FL (ref 8.9–12.7)
POTASSIUM SERPL-SCNC: 4.9 MMOL/L (ref 3.5–5.3)
PROT SERPL-MCNC: 7.1 G/DL (ref 6.4–8.2)
PROT UR STRIP-MCNC: NEGATIVE MG/DL
RBC # BLD AUTO: 3.97 MILLION/UL (ref 3.81–5.12)
RBC #/AREA URNS AUTO: ABNORMAL /HPF
SODIUM SERPL-SCNC: 136 MMOL/L (ref 136–145)
SP GR UR STRIP.AUTO: 1.02 (ref 1–1.03)
UROBILINOGEN UR QL STRIP.AUTO: 0.2 E.U./DL
WBC # BLD AUTO: 5.69 THOUSAND/UL (ref 4.31–10.16)
WBC #/AREA URNS AUTO: ABNORMAL /HPF

## 2021-08-21 PROCEDURE — 80053 COMPREHEN METABOLIC PANEL: CPT

## 2021-08-21 PROCEDURE — 36415 COLL VENOUS BLD VENIPUNCTURE: CPT

## 2021-08-21 PROCEDURE — 81001 URINALYSIS AUTO W/SCOPE: CPT

## 2021-08-21 PROCEDURE — 99285 EMERGENCY DEPT VISIT HI MDM: CPT | Performed by: EMERGENCY MEDICINE

## 2021-08-21 PROCEDURE — 85025 COMPLETE CBC W/AUTO DIFF WBC: CPT

## 2021-08-21 PROCEDURE — 99284 EMERGENCY DEPT VISIT MOD MDM: CPT

## 2021-08-21 PROCEDURE — 74018 RADEX ABDOMEN 1 VIEW: CPT

## 2021-08-21 RX ORDER — SODIUM PHOSPHATE, DIBASIC AND SODIUM PHOSPHATE, MONOBASIC 7; 19 G/133ML; G/133ML
1 ENEMA RECTAL ONCE
Status: COMPLETED | OUTPATIENT
Start: 2021-08-21 | End: 2021-08-21

## 2021-08-21 RX ADMIN — SODIUM PHOSPHATE, DIBASIC AND SODIUM PHOSPHATE, MONOBASIC 1 ENEMA: 7; 19 ENEMA RECTAL at 17:50

## 2021-08-21 NOTE — DISCHARGE INSTRUCTIONS
Please continue giving Marleen Gilmore her daily Linzess, her prune juice, and her magnesium supplement as usual  Give Elly her senna-docusate (Senokot-S) once daily as you usually do  Step 1 - maintaining regular bowel movements:  Start by giving Elly 1 capful of MiraLax once daily  Generally, we try to dose MiraLax to have 1 soft bowel movement once per day  If you find that Marleen Gilmore is not getting regular bowel movements (at least 1 soft bowel movement every 2- 3 days), please increase the Miralax dose to 2 capfuls  If Marleen Gilmore develops loose stools, please decrease the dose of Miralax  Step 2 - clean out or bowel prep day:  - If Marleen Gilmore has no bowel movement for more than 5 days or she appears in distress, please administer bisacodyl 15 mg and make sure Marleen Gilmore is drinking plenty of fluids (6 - 8 cups of water or electrolyte containing fluids throughout the day)  This is a bowel prep medication that should cause loose stools and complete evacuation of bowel  Continue making sure Marleen Gilmore is hydrating well, receiving about 8 eight-ounce glasses of fluids per day  Continue with her vegetables, cornflakes and other fiber supplement  Follow up with Gastroenterology

## 2021-08-21 NOTE — ED PROVIDER NOTES
History  Chief Complaint   Patient presents with    High Blood Pressure     per patient's mother, "her blood pressure is really high and she isn't speaking like she usually does, and she's a bit more wobbly and wants to just lay down"     58-year-old nonverbal female with history of cerebral palsy presents to emergency department for high blood pressure and constipation  History provided by mother who states that patient's blood pressure this morning was in the 799K systolic  Normal systolic blood pressure is 110-120  Patient is also not had a voluntary bowel movement in 3 weeks  Mother patient also notes patient less verbal over the past week and seems slightly off balance while ambulating  Patient was taken to [facility] emergency room at the onset of symptoms  CTH at the time showed no acute pathology  CT abdomen showed significant stool burden and colitis  Patient was treated for UTI and discharged  According to mother patient's symptoms have not improved over the past week  Patient is on a lifelong bowel regimen of MiraLax, prune juice and occasional enemas for frequent constipation  Mother patient perform 2 suppositories and 2 enemas yesterday with no effect  Patient has been followed by GI her entire life but has not seen GI in the past 2-3 years  Mother of patient denies fevers, blood in the urine, unilateral weakness, or facial droop  Patient takes Norvasc and valsartan as needed for hypertension  History of hypothyroidism, currently on levo  History of abdominal surgery 20+ years ago  Prior to Admission Medications   Prescriptions Last Dose Informant Patient Reported? Taking?    DULoxetine (CYMBALTA) 20 mg capsule 8/21/2021 at Unknown time  Yes Yes   Sig: Take 20 mg by mouth daily   Garlic 7547 MG CAPS 3/71/1328 at Unknown time Mother Yes Yes   Sig: Take by mouth   Linzess 145 MCG CAPS 8/21/2021 at Unknown time  No Yes   Sig: TAKE 1 CAPSULE BY MOUTH EVERY DAY   Vitamin E 100 UNIT/GM CREA 8/21/2021 at Unknown time Mother Yes Yes   Sig: Apply 2,000 Units topically   amLODIPine (NORVASC) 2 5 mg tablet 8/21/2021 at Unknown time Mother Yes Yes   Sig: TAKE 1 TABLET (2 5 MG TOTAL) BY MOUTH DAILY   HOLD IF SYSTOLIC BLOOD PRESSURES LESS THAN 130   lansoprazole (PREVACID) 30 mg capsule 8/21/2021 at Unknown time  No Yes   Sig: TAKE 1 CAPSULE BY MOUTH EVERY DAY   levothyroxine 150 mcg tablet 8/21/2021 at Unknown time Mother No Yes   Sig: Take 1 tablet (150 mcg total) by mouth daily   lidocaine (XYLOCAINE) 5 % ointment 8/21/2021 at Unknown time Mother Yes Yes   Sig: lidocaine 5 % topical ointment   meloxicam (MOBIC) 7 5 mg tablet 8/21/2021 at Unknown time  Yes Yes   Sig: Take 7 5 mg by mouth daily   methocarbamol (ROBAXIN) 500 mg tablet 8/21/2021 at Unknown time Mother Yes Yes   Sig: Take 500 mg by mouth 4 (four) times a day as needed   nitrofurantoin (MACRODANTIN) 100 mg capsule 8/21/2021 at Unknown time Mother Yes Yes   Sig: Take 100 mg by mouth 4 (four) times a day   pyridoxine (VITAMIN B6) 100 mg tablet 8/21/2021 at Unknown time  Yes Yes   Sig: Take 100 mg by mouth daily   senna-docusate sodium (SENOKOT-S) 8 6-50 mg per tablet  Mother Yes No   Sig: Take 1 tablet by mouth   valsartan (DIOVAN) 320 MG tablet 8/21/2021 at Unknown time  No Yes   Sig: TAKE 1 TABLET BY MOUTH EVERY DAY   vitamin B-12 (CYANOCOBALAMIN) 50 MCG tablet 8/21/2021 at Unknown time Mother Yes Yes   Sig: Take by mouth daily      Facility-Administered Medications: None       Past Medical History:   Diagnosis Date    Arthritis     Blood pressure instability UNKNOWN    Bowel obstruction (HCC)     intestine    Bradycardia     Cerebral palsy (HCC)     Chronic edema     [facility]osed fracture of coccyx ([facility] [address] [facility]     DVT (deep venous thrombosis) ([facility] [address] )     US Doppler done 10/2015 and 3/2016    GERD (gastroesophageal reflux disease)     History of Doppler ultrasound     of UE and LE; 10/2015 and 3/2016 DVT    History of echocardiogram 03/2016    EF 55%  Trace mitral and tricuspid regurgitation  PA systolic pressure 33  Echo Doppler 2/2017- EF 60%   History of EKG 10/20/2016    Normal sinus rhythm with sinus arrhythmia  Abnormal ECG     History of Holter monitoring 11/2017    14 DAY MONITOR Baseline rhythm was of sinus origin with rare APCs and VPCs and a few baseline motion artifact  NO OTHER ARRHYTHMIAS  No symptoms was reported in the attached diary   History of Holter monitoring 10/2016    Sinus rhythm  There were a few episodes of asymptomatic sinus arrhythmia  There was sinus bradycardia at rate as low as 47 bpm  There were rare single PACs and PVCs   Hypertension     Hyperthyroidism     Intellectual disability     Kidney stone     Lymphedema     Pneumonia     Stroke ([facility] [address] )     UNCERTAIN OF TYPE/NON VERBAL    Tachycardia     Thyroid disease     Urinary incontinence     Urinary tract infection        Past Surgical History:   Procedure Laterality Date    ABDOMINAL HYSTERECTOMY      COLONOSCOPY      EYE SURGERY      HYSTERECTOMY      HYSTERECTOMY      IR DRAINAGE TUBE PLACEMENT  6/12/2020    SMALL INTESTINE SURGERY         Family History   Problem Relation Age of Onset    Cancer Father     Colon cancer Father     Asthma Mother     Stroke Mother     WESTLEY disease Mother     Arthritis Mother     Diabetes type II Mother     Mitral valve prolapse Mother     Irritable bowel syndrome Mother     Heart disease Mother      I have reviewed and agree with the history as documented      E-Cigarette/Vaping    E-Cigarette Use Never User      E-Cigarette/Vaping Substances     Social History     Tobacco Use    Smoking status: Never Smoker    Smokeless tobacco: Never Used   Vaping Use    Vaping Use: Never used   Substance Use Topics    Alcohol use: No    Drug use: No        Review of Systems   Unable to perform ROS: Patient nonverbal       Physical Exam  ED Triage Vitals [08/21/21 1256]   Temperature Pulse Respirations Blood Pressure SpO2   (!) 97 2 °F (36 2 °C) 96 20 137/89 98 %      Temp Source Heart Rate Source Patient Position - Orthostatic VS BP Location FiO2 (%)   Tympanic Monitor Sitting Right arm --      Pain Score       --             Orthostatic Vital Signs  Vitals:    08/21/21 1256 08/21/21 1518 08/21/21 1633 08/21/21 1905   BP: 137/89 141/81 133/90 121/79   Pulse: 96 96 91 92   Patient Position - Orthostatic VS: Sitting Lying  Lying       Physical Exam  Vitals (Blood pressure and temperature normal on presentation) and nursing note reviewed  Constitutional:       General: She is not in acute distress  Appearance: She is not ill-appearing, toxic-appearing or diaphoretic  Comments: Patient alert, nonverbal, acknowledges interviewers presence and makes appropriate eye contact  Does not follow commands  HENT:      Head: Normocephalic and atraumatic  Right Ear: External ear normal       Left Ear: External ear normal       Nose: Nose normal       Mouth/Throat:      Mouth: Mucous membranes are moist       Pharynx: Oropharynx is clear  Eyes:      General:         Right eye: No discharge  Left eye: No discharge  Conjunctiva/sclera: Conjunctivae normal       Pupils: Pupils are equal, round, and reactive to light  Cardiovascular:      Rate and Rhythm: Normal rate and regular rhythm  Pulses: Normal pulses  Heart sounds: Normal heart sounds  No murmur heard  No friction rub  Pulmonary:      Effort: Pulmonary effort is normal  No respiratory distress  Breath sounds: Normal breath sounds  No wheezing or rales  Abdominal:      General: Abdomen is flat  Bowel sounds are normal  There is no distension  Palpations: Abdomen is soft  There is no mass  Tenderness: There is abdominal tenderness  There is no guarding or rebound  Hernia: No hernia is present        Comments: Tenderness localized to the epigastric area   Musculoskeletal:         General: No tenderness, deformity or signs of injury  Cervical back: Normal range of motion and neck supple  Right lower leg: Edema present  Left lower leg: Edema present  Comments: Bilateral nonpitting lower extremity edema  At baseline according to mother   Skin:     General: Skin is warm and dry  Capillary Refill: Capillary refill takes less than 2 seconds  Coloration: Skin is not jaundiced or pale  Neurological:      Mental Status: She is alert  Gait: Gait normal       Comments: According to mother patient is less verbal from baseline  Patient able to ambulate during exam without difficulty           ED Medications  Medications   sodium phosphate-biphosphate (FLEET) enema 1 enema (1 enema Rectal Given 8/21/21 1750)       Diagnostic Studies  Results Reviewed     Procedure Component Value Units Date/Time    Urine Microscopic [[id number]]  (Abnormal) Collected: 08/21/21 1726    Lab Status: Final result Specimen: Urine, Other Updated: 08/21/21 1748     RBC, UA 4-10 /hpf      WBC, UA None Seen /hpf      Epithelial Cells None Seen /hpf      Bacteria, UA None Seen /hpf      Hyaline Casts, UA None Seen /lpf     Comprehensive metabolic panel [[id number]]  (Abnormal) Collected: 08/21/21 1716    Lab Status: Final result Specimen: Blood from Arm, Left Updated: 08/21/21 1746     Sodium 136 mmol/L      Potassium 4 9 mmol/L      Chloride 108 mmol/L      CO2 26 mmol/L      ANION GAP 2 mmol/L      BUN 23 mg/dL      Creatinine 0 76 mg/dL      Glucose 87 mg/dL      Calcium 9 1 mg/dL      AST 25 U/L      ALT 35 U/L      Alkaline Phosphatase 88 U/L      Total Protein 7 1 g/dL      Albumin 3 7 g/dL      Total Bilirubin 0 32 mg/dL      eGFR 88 ml/min/1 73sq m     Narrative:      [place] guidelines for Chronic Kidney Disease (CKD):     Stage 1 with normal or high GFR (GFR > 90 mL/min/1 73 square meters)    Stage 2 Mild CKD (GFR = 60-89 mL/min/1 73 square meters)    Stage 3A Moderate CKD (GFR = 45-59 mL/min/1 73 square meters)    Stage 3B Moderate CKD (GFR = 30-44 mL/min/1 73 square meters)    Stage 4 Severe CKD (GFR = 15-29 mL/min/1 73 square meters)    Stage 5 End Stage CKD (GFR <15 mL/min/1 73 square meters)  Note: GFR calculation is accurate only with a steady state creatinine    Urine Macroscopic, POC [[id number]]  (Abnormal) Collected: 08/21/21 1726    Lab Status: Final result Specimen: Urine Updated: 08/21/21 1728     Color, UA Yellow     Clarity, UA Clear     pH, UA 7 5     Leukocytes, UA Negative     Nitrite, UA Negative     Protein, UA Negative mg/dl      Glucose, UA Negative mg/dl      Ketones, UA Negative mg/dl      Urobilinogen, UA 0 2 E U /dl      Bilirubin, UA Negative     Blood, UA Trace     Specific [place], UA 1 020    Narrative:      CLINITEK RESULT    CBC and differential [[id number]] Collected: 08/21/21 1716    Lab Status: Final result Specimen: Blood from Arm, Left Updated: 08/21/21 1726     WBC 5 69 Thousand/uL      RBC 3 97 Million/uL      Hemoglobin 11 5 g/dL      Hematocrit 36 3 %      MCV 91 fL      MCH 29 0 pg      MCHC 31 7 g/dL      RDW 13 3 %      MPV 10 2 fL      Platelets 532 Thousands/uL      nRBC 0 /100 WBCs      Neutrophils Relative 50 %      Immat GRANS % 0 %      Lymphocytes Relative 39 %      Monocytes Relative 6 %      Eosinophils Relative 4 %      Basophils Relative 1 %      Neutrophils Absolute 2 86 Thousands/µL      Immature Grans Absolute 0 01 Thousand/uL      Lymphocytes Absolute 2 22 Thousands/µL      Monocytes Absolute 0 35 Thousand/µL      Eosinophils Absolute 0 21 Thousand/µL      Basophils Absolute 0 04 Thousands/µL                  XR abdomen 1 view kub    (Results Pending)         Procedures  Procedures      ED Course  ED Course as of Aug 22 0856   Sat Aug 21, 2021   1727 750 cc urine on straight cath  Abd xray shows considerable stool burden and bowel gas  Will administer FLEET enema  1856 + BM after enema   Mother of pt agrees to discharge  Follow up information provided for GI and neurology  SBIRT 22yo+      Most Recent Value   SBIRT (24 yo +)   In order to provide better care to our patients, we are screening all of our patients for alcohol and drug use  Would it be okay to ask you these screening questions? Unable to answer at this time Filed at: 08/21/2021 1600                [facility]  Number of Diagnoses or Management Options  Altered mental status, unspecified altered mental status type: established and worsening  Chronic constipation: established and worsening  Diagnosis management comments: Impression:  Negative CT head on 07/13 and 7/18 with adequate ambulation on exam makes primary neuro pathology less likely  Symptoms likely due to abdominal pain from constipation  Low likelihood of acute abdominal process given benign abdominal exam, normal appearance, and normal VS  However, given nonverbal status will order upright abdominal x-ray to assess for possible perforations and assess extent of stool burden  Low suspicion for infectious process  Will order CBC, CMP, UA due to patient's recent history of UTI  Higher than average blood pressure likely reactionary to abdominal discomfort         Amount and/or Complexity of Data Reviewed  Clinical lab tests: ordered  Tests in the radiology section of CPT®: ordered  Review and summarize past medical records: yes  Independent visualization of images, tracings, or specimens: yes    Risk of Complications, Morbidity, and/or Mortality  Presenting problems: moderate  Diagnostic procedures: minimal  Management options: low    Patient Progress  Patient progress: stable      Disposition  Final diagnoses:   Chronic constipation   Altered mental status, unspecified altered mental status type     Time reflects when diagnosis was documented in both MDM as applicable and the Disposition within this note     Time User Action Codes Description Comment    8/21/2021  4:51 PM Cristian Kahn Add [K59 09] Chronic constipation     2021  4:51 PM Cristian Kahn Add [R41 82] Altered mental status, unspecified altered mental status type       ED Disposition     ED Disposition Condition Date/Time Comment    Discharge Stable Sat Aug 21, 2021  6:43 PM Rudy Narayan discharge to home/self care  Follow-up Information     Follow up With Specialties Details Why Contact Info Additional Information    Neurology [facility] Neurology Schedule an appointment as soon as possible for a visit   [address]  [phone] Neurology [facility], [address] [place] [address], [address]    [facility] Schedule an appointment as soon as possible for a visit   [address]  [place] [address] [id number]  [address] [facility] Neurology [facility], Luige Nelson [address] [facility]  [address], [facility], [address] [facility]   [phone]    [facility] Schedule an appointment as soon as possible for a visit   [address]  [phone]             Discharge Medication List as of 2021  6:44 PM      START taking these medications    Details   bisacodyl (DULCOLAX) 5 mg EC tablet Take 3 tablets (15 mg total) by mouth once as needed for constipation for up to 5 doses, Starting Sat 2021, Normal         CONTINUE these medications which have NOT CHANGED    Details   amLODIPine (NORVASC) 2 5 mg tablet TAKE 1 TABLET (2 5 MG TOTAL) BY MOUTH DAILY   HOLD IF SYSTOLIC BLOOD PRESSURES LESS THAN 130, Historical Med      DULoxetine (CYMBALTA) 20 mg capsule Take 20 mg by mouth daily, Historical Med      Garlic 9176 MG CAPS Take by mouth, Historical Med      lansoprazole (PREVACID) 30 mg capsule TAKE 1 CAPSULE BY MOUTH EVERY DAY, Normal      levothyroxine 150 mcg tablet Take 1 tablet (150 mcg total) by mouth daily, Starting u 4/30/2020, Normal      lidocaine (XYLOCAINE) 5 % ointment lidocaine 5 % topical ointment, Historical Med      Linzess 145 MCG CAPS TAKE 1 CAPSULE BY MOUTH EVERY DAY, Normal      meloxicam (MOBIC) 7 5 mg tablet Take 7 5 mg by mouth daily, Historical Med      methocarbamol (ROBAXIN) 500 mg tablet Take 500 mg by mouth 4 (four) times a day as needed, Starting Fri 4/17/2020, Historical Med      nitrofurantoin (MACRODANTIN) 100 mg capsule Take 100 mg by mouth 4 (four) times a day, Historical Med      pyridoxine (VITAMIN B6) 100 mg tablet Take 100 mg by mouth daily, Historical Med      valsartan (DIOVAN) 320 MG tablet TAKE 1 TABLET BY MOUTH EVERY DAY, Normal      vitamin B-12 (CYANOCOBALAMIN) 50 MCG tablet Take by mouth daily, Historical Med      Vitamin E 100 UNIT/GM CREA Apply 2,000 Units topically, Historical Med      senna-docusate sodium (SENOKOT-S) 8 6-50 mg per tablet Take 1 tablet by mouth, Starting Fri 12/6/2019, Until Sat 12/5/2020, Historical Med               PDMP Review     None           ED Provider  Attending physically available and evaluated Jakob Sánchez I managed the patient along with the ED Attending      Electronically Signed by         Sharlette Skiff, MD  08/22/21 2126

## 2021-08-21 NOTE — ED ATTENDING ATTESTATION
8/21/2021  I saw and evaluated the patient  I have discussed the patient with the resident physician and agree with the resident's findings, assessment and plan as documented in the resident physician's note, unless otherwise documented below  All available laboratory and imaging studies were reviewed by myself  I was present for key portions of any procedure(s) performed by the resident and I was immediately available to provide assistance  I agree with the current assessment done in the Emergency Department  I have conducted an independent evaluation of this patient  Chief Complaint   Patient presents with    High Blood Pressure     per patient's mother, "her blood pressure is really high and she isn't speaking like she usually does, and she's a bit more wobbly and wants to just lay down"      Tucker Serrano is a 57-year-old female with past medical history of cerebral palsy, hypertension, DVT, bowel obstruction, constipation, recurrent UTIs, kidney stones, presenting with her mother due to lack of bowel movements, elevated blood pressures, and a degree of altered mental status in setting of what mother believes to be pain  Patient's mother has been patient's caretaker for the past 64 years  History obtained entirely from patient's mother as patient is essentially nonverbal   Per mom, patient's blood pressure has been in the 936U systolic which is higher than usual for her and she has intermittently had elevated diastolic blood pressure  Patient has been more off balance while trying to ambulate and has been less verbal over the past week  She has also made facial expressions indicating that she is in pain  Patient has not had a spontaneous bowel movement in close to 3 weeks  Patient was seen for these symptoms at [facility] on 08/13  At that time, patient was status post treatment of UTI and patient's mother was wondering if patient had another kidney stone    Patient was evaluated in the emergency room with labs as well as CT scan  Her labs at that time were essentially baseline/unremarkable and CT of the abdomen showed no evidence of bowel obstruction, congenital non rotation of the bowel, moderate to large stool in the colon, no bowel obstruction, as well as segmental sigmoid colitis  Since then, patient's mother has been treating her constipation with MiraLax, prune juice, as well as occasional enemas, however, there has been minimal effect  Patient has been followed by GI throughout her life but most recently, her gastroenterologist has retired and patient has not seen a gastroenterologist in 2 years  Patient has not had fevers, cough, vomiting, or rashes  Rest of review of systems is limited as patient is unable to contribute to review of systems and review of systems is obtained with assistance of patient's mother  Review of Systems Obtained from patient's mother as patient is nonverbal and has intellectual disability  Constitutional: no fevers  Visual/Eyes: no eye discharge  HENT: no rhinorrhea  Respiratory: no shortness of breath, no cough  GI: Possible abdominal pain, constipation  Heme/Onc: remote history of DVT  Neuro: CP    Physical Exam  Vitals:    08/21/21 1256 08/21/21 1518   BP: 137/89 141/81   TempSrc: Tympanic    Pulse: 96 96   Resp: 20 20   Patient Position - Orthostatic VS: Sitting Lying   Temp: (!) 97 2 °F (36 2 °C)      SPO2 RA Rest      ED from 8/21/2021 in [facility]   SpO2  99 %   SpO2 Activity  At Rest   O2 Device  None (Room air)   O2 Flow Rate  --          Constitutional:  Alert, non-verbal, makes eye contact, does not follow Awake, alert, oriented  No acute distress  HEENT:  Normocephalic, atraumatic  Sclera anicteric, conjunctiva not injected  Moist oral mucosa  Cardiac:  Regular rate and rhythm, no murmurs, rubs, or gallops  2+ radial pulses    Respiratory:  Lungs are clear to auscultation bilaterally, no wheezes or rales  Abdomen:  Nondistended  Bowel sounds present  Tender to palpation in upper abdomen  Extremities:  No deformities, 1+ edema up to mid-shin bilaterally  Integument:  No rashes over exposed areas, cap refill less than 2 seconds  Neurologic:  Patient is awake, alert, looking around the room  She does not follow commands but is intermittently verbalizing  Muscle bulk atrophy noted    Psychiatric: Less verbal per mother        Diagnostic Studies  Results Reviewed     Procedure Component Value Units Date/Time    Urine Microscopic [[id number]]  (Abnormal) Collected: 08/21/21 1726    Lab Status: Final result Specimen: Urine, Other Updated: 08/21/21 1748     RBC, UA 4-10 /hpf      WBC, UA None Seen /hpf      Epithelial Cells None Seen /hpf      Bacteria, UA None Seen /hpf      Hyaline Casts, UA None Seen /lpf     Comprehensive metabolic panel [[id number]]  (Abnormal) Collected: 08/21/21 1716    Lab Status: Final result Specimen: Blood from Arm, Left Updated: 08/21/21 1746     Sodium 136 mmol/L      Potassium 4 9 mmol/L      Chloride 108 mmol/L      CO2 26 mmol/L      ANION GAP 2 mmol/L      BUN 23 mg/dL      Creatinine 0 76 mg/dL      Glucose 87 mg/dL      Calcium 9 1 mg/dL      AST 25 U/L      ALT 35 U/L      Alkaline Phosphatase 88 U/L      Total Protein 7 1 g/dL      Albumin 3 7 g/dL      Total Bilirubin 0 32 mg/dL      eGFR 88 ml/min/1 73sq m     Narrative:      [place] guidelines for Chronic Kidney Disease (CKD):     Stage 1 with normal or high GFR (GFR > 90 mL/min/1 73 square meters)    Stage 2 Mild CKD (GFR = 60-89 mL/min/1 73 square meters)    Stage 3A Moderate CKD (GFR = 45-59 mL/min/1 73 square meters)    Stage 3B Moderate CKD (GFR = 30-44 mL/min/1 73 square meters)    Stage 4 Severe CKD (GFR = 15-29 mL/min/1 73 square meters)    Stage 5 End Stage CKD (GFR <15 mL/min/1 73 square meters)  Note: GFR calculation is accurate only with a steady state creatinine Urine Macroscopic, POC [[id number]]  (Abnormal) Collected: 08/21/21 1726    Lab Status: Final result Specimen: Urine Updated: 08/21/21 1728     Color, UA Yellow     Clarity, UA Clear     pH, UA 7 5     Leukocytes, UA Negative     Nitrite, UA Negative     Protein, UA Negative mg/dl      Glucose, UA Negative mg/dl      Ketones, UA Negative mg/dl      Urobilinogen, UA 0 2 E U /dl      Bilirubin, UA Negative     Blood, UA Trace     Specific [place], UA 1 020    Narrative:      CLINITEK RESULT    CBC and differential [[id number]] Collected: 08/21/21 1716    Lab Status: Final result Specimen: Blood from Arm, Left Updated: 08/21/21 1726     WBC 5 69 Thousand/uL      RBC 3 97 Million/uL      Hemoglobin 11 5 g/dL      Hematocrit 36 3 %      MCV 91 fL      MCH 29 0 pg      MCHC 31 7 g/dL      RDW 13 3 %      MPV 10 2 fL      Platelets 897 Thousands/uL      nRBC 0 /100 WBCs      Neutrophils Relative 50 %      Immat GRANS % 0 %      Lymphocytes Relative 39 %      Monocytes Relative 6 %      Eosinophils Relative 4 %      Basophils Relative 1 %      Neutrophils Absolute 2 86 Thousands/µL      Immature Grans Absolute 0 01 Thousand/uL      Lymphocytes Absolute 2 22 Thousands/µL      Monocytes Absolute 0 35 Thousand/µL      Eosinophils Absolute 0 21 Thousand/µL      Basophils Absolute 0 04 Thousands/µL           XR abdomen 1 view kub   Final Result      Gas-filled bowel loops  No obstruction  No pneumoperitoneum  Workstation performed: [id number]             Procedures  Procedures            ED Course  Medications   sodium phosphate-biphosphate (FLEET) enema 1 enema (1 enema Rectal Given 8/21/21 160)     67-year-old female presenting with intermittently elevated blood pressures, appearance of pain, last talking, and difficulty with bowel movements for about 3 weeks  Vital signs reviewed, afebrile, normotensive, not tachycardic or hypoxic    Differential diagnosis includes discomfort due to constipation, bowel obstruction, infection, nephrolithiasis, versus another etiology of symptoms  Medical record including most recent visit on 08/13 to [facility] ER reviewed  Abdominal exam is revealing a soft abdomen with mild tenderness in epigastrium and left upper quadrant  Patient's symptoms are not significantly changed from when she was evaluated 8 days ago and underwent CT imaging of the abdomen  I have a lower index of suspicion for bowel obstruction  Will obtain basic labs as well as abdominal x-ray to assess for bowel obstruction  Labs reveal unremarkable CMP, unremarkable CBC, UA with trace blood but no pyuria or bacteria  Of note, when patient was straight cathed for urine, there was output of 750 mL of urine  Patient may be suffering from intermittent urinary obstruction either due to underlying cerebral palsy or due to constipation  Patient did appear to be clinically more comfortable after she has been catheterized for urine  X-ray reveals gas-filled loops of bowel without evidence of obstruction or pneumoperitoneum  Fleets enema administered  At this time, patient needs to have a regular, daily bowel regimen to help regular ice bowel movements and help reduce chance of urinary retention  Patient discharged to home with the following instructions  Patient discharged home with recommendations to follow-up with GI  "Please continue giving Elzie Drone her daily Linzess, her prune juice, and her magnesium supplement as usual  Give Elly her senna-docusate (Senokot-S) once daily as you usually do  Step 1 - maintaining regular bowel movements:  Start by giving Elly 1 capful of MiraLax once daily  Generally, we try to dose MiraLax to have 1 soft bowel movement once per day  If you find that Elzie Drone is not getting regular bowel movements (at least 1 soft bowel movement every 2- 3 days), please increase the Miralax dose to 2 capfuls   If Elzie Drone develops loose stools, please decrease the dose of Miralax  Step 2 - clean out or bowel prep day:  - If Vincent Sepulveda has no bowel movement for more than 5 days or she appears in distress, please administer bisacodyl 15 mg and make sure Vincent Sepulveda is drinking plenty of fluids (6 - 8 cups of water or electrolyte containing fluids throughout the day)  This is a bowel prep medication that should cause loose stools and complete evacuation of bowel  Continue making sure Vincent Sepulveda is hydrating well, receiving about 8 eight-ounce glasses of fluids per day  Continue with her vegetables, cornflakes and other fiber supplement  Follow up with Gastroenterology     "    Clinical Impression  Final diagnoses:   Chronic constipation   Altered mental status, unspecified altered mental status type       Discharge Medication List as of 8/21/2021  6:44 PM      START taking these medications    Details   bisacodyl (DULCOLAX) 5 mg EC tablet Take 3 tablets (15 mg total) by mouth once as needed for constipation for up to 5 doses, Starting Sat 8/21/2021, Normal         CONTINUE these medications which have NOT CHANGED    Details   amLODIPine (NORVASC) 2 5 mg tablet TAKE 1 TABLET (2 5 MG TOTAL) BY MOUTH DAILY   HOLD IF SYSTOLIC BLOOD PRESSURES LESS THAN 130, Historical Med      DULoxetine (CYMBALTA) 20 mg capsule Take 20 mg by mouth daily, Historical Med      Garlic 9152 MG CAPS Take by mouth, Historical Med      lansoprazole (PREVACID) 30 mg capsule TAKE 1 CAPSULE BY MOUTH EVERY DAY, Normal      levothyroxine 150 mcg tablet Take 1 tablet (150 mcg total) by mouth daily, Starting Thu 4/30/2020, Normal      lidocaine (XYLOCAINE) 5 % ointment lidocaine 5 % topical ointment, Historical Med      Linzess 145 MCG CAPS TAKE 1 CAPSULE BY MOUTH EVERY DAY, Normal      meloxicam (MOBIC) 7 5 mg tablet Take 7 5 mg by mouth daily, Historical Med      methocarbamol (ROBAXIN) 500 mg tablet Take 500 mg by mouth 4 (four) times a day as needed, Starting Fri 4/17/2020, Historical Med      nitrofurantoin (MACRODANTIN) 100 mg capsule Take 100 mg by mouth 4 (four) times a day, Historical Med      pyridoxine (VITAMIN B6) 100 mg tablet Take 100 mg by mouth daily, Historical Med      valsartan (DIOVAN) 320 MG tablet TAKE 1 TABLET BY MOUTH EVERY DAY, Normal      vitamin B-12 (CYANOCOBALAMIN) 50 MCG tablet Take by mouth daily, Historical Med      Vitamin E 100 UNIT/GM CREA Apply 2,000 Units topically, Historical Med      senna-docusate sodium (SENOKOT-S) 8 6-50 mg per tablet Take 1 tablet by mouth, Starting Fri 12/6/2019, Until Sat 12/5/2020, Historical Med

## 2021-09-13 ENCOUNTER — NURSING HOME VISIT (OUTPATIENT)
Dept: GERIATRICS | Facility: OTHER | Age: 56
End: 2021-09-13
Payer: MEDICARE

## 2021-09-13 VITALS
RESPIRATION RATE: 18 BRPM | HEART RATE: 105 BPM | TEMPERATURE: 98.1 F | SYSTOLIC BLOOD PRESSURE: 114 MMHG | OXYGEN SATURATION: 96 % | WEIGHT: 138.8 LBS | DIASTOLIC BLOOD PRESSURE: 75 MMHG | BODY MASS INDEX: 27.11 KG/M2

## 2021-09-13 DIAGNOSIS — U07.1 COVID-19 VIRUS INFECTION: Primary | ICD-10-CM

## 2021-09-13 DIAGNOSIS — N39.0 RECURRENT UTI: ICD-10-CM

## 2021-09-13 PROCEDURE — 99306 1ST NF CARE HIGH MDM 50: CPT | Performed by: INTERNAL MEDICINE

## 2021-09-13 RX ORDER — DEXAMETHASONE 6 MG/1
6 TABLET ORAL 2 TIMES DAILY WITH MEALS
COMMUNITY
End: 2021-09-20 | Stop reason: ALTCHOICE

## 2021-09-13 RX ORDER — ASPIRIN 81 MG/1
81 TABLET, CHEWABLE ORAL DAILY
COMMUNITY
End: 2021-09-20 | Stop reason: SDUPTHER

## 2021-09-14 NOTE — PROGRESS NOTES
[facility]  [address] [place] [address], [address] [address]    Nursing Home Admission    NAME: Dedra Ramirez  AGE: 63 y o  SEX: female [id number]      Patient Location     [facility]    Patients care was coordinated with nursing facility staff  Recent vitals, labs and updated medications were reviewed on [employer] system of facility  Past Medical, surgical, social, medication and allergy history and patients previous records reviewed  Assessment/Plan:    COVID-19 virus infection  Patient was recently hospitalized with progressive functional decline, inability to ambulate and generalized weakness in the setting of cognitive developmental delay  Patient tested positive for COVID-19 at the hospital   She was treated for COVID-19 pneumonia with remdesivir dexamethasone   Respiratory status currently remains stable  Patient is not requiring any supplemental oxygen  Will continue to monitor    Recurrent UTI  Patient recently completed antibiotic course    Hypernatremia  Sodium level was 153 on 9 6/21, subsequently dropped down to 128  Follow-up repeat BMP    Failure to thrive:  Patient was noted to have progressive functional decline during last few weeks  Encourage p o  intake  Follow-up with dietitian if needed    Hypothyroidism:  Continue levothyroxine    Chronic constipation:  Patient remains on Linzess    HTN  Blood pressure stable on valsartan      Cognitive development delay:  Continue supportive treatment      Ambulatory dysfunction  Continue PT OT      Transaminitis:   Attributed to COVID-19 infection  Recent right upper quadrant ultrasound was unrevealing      MARIA ELENA:  Noted recently at the hospital attributed to prerenal state from dehydration  Renal function improved with IV fluids  Follow repeat BMP    Chronic pancytopenia:  Patient has history of chronic pancytopenia    Follow-up with heme/onc    Dysphagia:  Continue dysphagia tailored diet    GERD:  Continue PPI      Ulcers involving left great and right 5th toe:  Consult wound care team   Currently does not appear to be infected    Chief Complaint      COVID-19 pneumonia, cognitive developmental delay, deconditioning, generalized weakness, chronic constipation    HPI       Patient is a 63 y o  female with past medical history significant for cognitive dysfunction, hypothyroidism, chronic constipation, developmental delay and GERD  Patient was recently hospitalized with progressive functional decline, inability to ambulate and reduce speech in the setting of cognitive developmental delay  Patient had been on Augmentin for UTI treatment prior to admission  She was additionally seen in ER on 08/13 and 8/21 for constipation issues  Patient was evaluated by neurology service  Initial plan was to pursue LP, MRI and EEG however patient tested positive for COVID-19 at the hospital   Her symptoms were attributed to toxic metabolic encephalopathy from COVID-19 infection and further workup was withheld  Fever later subsided  Blood cultures remained negative  Patient had transaminitis attributed to COVID-19 infection  Right upper quadrant ultrasound was negative  Patient's respiratory status remained stable  Patient was additionally followed by wound care team for bilateral feet ulcers and other wounds  She was subsequently discharged to [facility] rehab where she is being seen post hospital admission  At the time of my evaluation patient is laying on the bed , restless at times   She is nonverbal, needing an assist of 2 for transfers  Patient remains incontinent of bowel and bladder currently on nectar thin liquids, needs  extensive assistance with all ADLs  History obtained from patient's mother at bedside       Past Medical History:   Diagnosis Date    Arthritis     Blood pressure instability UNKNOWN    Bowel obstruction (HCC)     intestine    Bradycardia     Cerebral palsy ([facility] )     Chronic edema     Closed fracture of coccyx (HCC)     DVT (deep venous thrombosis) ([facility] )     US Doppler done 10/2015 and 3/2016    GERD (gastroesophageal reflux disease)     History of Doppler ultrasound     of UE and LE; 10/2015 and 3/2016 DVT    History of echocardiogram 03/2016    EF 55%  Trace mitral and tricuspid regurgitation  PA systolic pressure 33  Echo Doppler 2/2017- EF 60%   History of EKG 10/20/2016    Normal sinus rhythm with sinus arrhythmia  Abnormal ECG     History of Holter monitoring 11/2017    14 DAY MONITOR Baseline rhythm was of sinus origin with rare APCs and VPCs and a few baseline motion artifact  NO OTHER ARRHYTHMIAS  No symptoms was reported in the attached diary   History of Holter monitoring 10/2016    Sinus rhythm  There were a few episodes of asymptomatic sinus arrhythmia  There was sinus bradycardia at rate as low as 47 bpm  There were rare single PACs and PVCs       Hypertension     Hyperthyroidism     Intellectual disability     Kidney stone     Lymphedema     Pneumonia     Stroke ([place] [address] )     UNCERTAIN OF TYPE/NON VERBAL    Tachycardia     Thyroid disease     Urinary incontinence     Urinary tract infection        Past Surgical History:   Procedure Laterality Date    ABDOMINAL HYSTERECTOMY      COLONOSCOPY      EYE SURGERY      HYSTERECTOMY      HYSTERECTOMY      IR DRAINAGE TUBE PLACEMENT  6/12/2020    SMALL INTESTINE SURGERY         Social History     Tobacco Use   Smoking Status Never Smoker   Smokeless Tobacco Never Used          Family History   Problem Relation Age of Onset    Cancer Father     Colon cancer Father     Asthma Mother     Stroke Mother     WESTLEY disease Mother     Arthritis Mother     Diabetes type II Mother     Mitral valve prolapse Mother     Irritable bowel syndrome Mother     Heart disease Mother         Allergies   Allergen Reactions    Bethanechol      Reaction Date: 10Dec2007; SEIZURES      Metoclopramide      Reaction Date: 10Dec2007; SEIZURES      Sulfamethoxazole-Trimethoprim      Reaction Date: 10Dec2007; SEIZURES            Current Outpatient Medications:     aspirin 81 mg chewable tablet, Chew 81 mg daily, Disp: , Rfl:     dexamethasone (DECADRON) 6 mg tablet, Take 6 mg by mouth 2 (two) times a day with meals  X  6 doses, Disp: , Rfl:     bisacodyl (DULCOLAX) 5 mg EC tablet, Take 3 tablets (15 mg total) by mouth once as needed for constipation for up to 5 doses, Disp: 15 tablet, Rfl: 0    Garlic 3527 MG CAPS, Take by mouth, Disp: , Rfl:     lansoprazole (PREVACID) 30 mg capsule, TAKE 1 CAPSULE BY MOUTH EVERY DAY, Disp: 90 capsule, Rfl: 3    levothyroxine 150 mcg tablet, Take 1 tablet (150 mcg total) by mouth daily, Disp: 90 tablet, Rfl: 1    Linzess 145 MCG CAPS, TAKE 1 CAPSULE BY MOUTH EVERY DAY, Disp: 90 capsule, Rfl: 1    meloxicam (MOBIC) 7 5 mg tablet, Take 7 5 mg by mouth daily, Disp: , Rfl:     methocarbamol (ROBAXIN) 500 mg tablet, Take 500 mg by mouth 4 (four) times a day as needed, Disp: , Rfl:     senna-docusate sodium (SENOKOT-S) 8 6-50 mg per tablet, Take 1 tablet by mouth, Disp: , Rfl:     valsartan (DIOVAN) 320 MG tablet, TAKE 1 TABLET BY MOUTH EVERY DAY, Disp: 90 tablet, Rfl: 1    Updated list was reviewed in [facility] system of facility  Vitals:    09/13/21 2017   BP: 114/75   Pulse: 105   Resp: 18   Temp: 98 1 °F (36 7 °C)   SpO2: 96%       Vital signs were reviewed in point [facility] care    Review of Systems   Unable to perform ROS: Other (Patient is unable to carry out any effective communication due to underlying cognitive developmental delay)   Constitutional: Negative for chills and fever  Eyes: Negative for discharge  Cardiovascular: Positive for leg swelling  Neurological: Positive for weakness (generalized)  Physical Exam  Constitutional:       Appearance: She is well-developed  She is not diaphoretic     HENT:      Head: Normocephalic and atraumatic  Nose: No rhinorrhea  Eyes:      General:         Right eye: No discharge  Left eye: No discharge  Cardiovascular:      Rate and Rhythm: Normal rate and regular rhythm  Pulmonary:      Effort: No respiratory distress  Breath sounds: No stridor  No wheezing  Abdominal:      General: There is no distension  Musculoskeletal:      Cervical back: Neck supple  Right lower leg: Edema present  Left lower leg: Edema present  Comments: Edema involving bilateral lower extremities extending to the dorsum of feet   Skin:     Coloration: Skin is not jaundiced or pale  Findings: Bruising (Ecchymosis involving upper extremities likely for recent blood draw) present  Comments: Patient has an open area involving dorsal aspect of left great toe  Ecchymotic area with skin breakdown is also noted on right 5th toe with mild adjacent erythema   Neurological:      Mental Status: She is disoriented  Motor: Weakness (Generalized) present  Comments: Patient is nonverbal   Psychiatric:      Comments: Restless at times     Cardiopulmonary exam not done due to COVID positive status    Diagnostic Data     Labs done on 09/08/2021 revealed hemoglobin of 7 6, WBC count 10 3, platelet count 744    Creatinine 1 15, sodium 128, potassium 3 5, bicarb 24      Recent labs and imaging studies were reviewed  CT head revealed moderate chronic microvascular white matter ischemic changes within the periventricular and subcortical white matter there was no evidence of any acute intracranial hemorrhage or infarction   CT abdomen pelvis done on 08/13/2021 was negative for bowel obstruction  Moderate to large stool in colon was reported    Ultrasound abdomen on 09/01/2021 showed normal liver gallbladder and bile ducts   Venous Doppler of bilateral lower extremity was reported negative for DVT on 08/25/2021     Code Status:      Full code    Additional notes:      Care coordinated with patient's mother at bedside   Consult wound care team for feet wound    Monitor closely for any signs of infection   Follow-up repeat labs  Total time spent on today's encounter was in excess of 45 minutes    This note was electronically signed by Dr Blu Ríos

## 2021-09-14 NOTE — ASSESSMENT & PLAN NOTE
Patient was recently hospitalized with progressive functional decline, inability to ambulate and generalized weakness in the setting of cognitive developmental delay  Patient tested positive for COVID-19 at the hospital   She was treated for COVID-19 pneumonia with remdesivir dexamethasone   Respiratory status currently remains stable  Patient is not requiring any supplemental oxygen    Will continue to monitor

## 2021-09-15 ENCOUNTER — NURSING HOME VISIT (OUTPATIENT)
Dept: WOUND CARE | Facility: HOSPITAL | Age: 56
End: 2021-09-15
Payer: MEDICARE

## 2021-09-15 ENCOUNTER — NURSING HOME VISIT (OUTPATIENT)
Dept: GERIATRICS | Facility: OTHER | Age: 56
End: 2021-09-15
Payer: MEDICARE

## 2021-09-15 DIAGNOSIS — N17.9 AKI (ACUTE KIDNEY INJURY) (HCC): ICD-10-CM

## 2021-09-15 DIAGNOSIS — U07.1 COVID-19 VIRUS INFECTION: Primary | ICD-10-CM

## 2021-09-15 DIAGNOSIS — L97.519 SKIN ULCERS OF BOTH FEET (HCC): ICD-10-CM

## 2021-09-15 DIAGNOSIS — L97.529 SKIN ULCERS OF BOTH FEET (HCC): ICD-10-CM

## 2021-09-15 DIAGNOSIS — R53.81 PHYSICAL DECONDITIONING: ICD-10-CM

## 2021-09-15 DIAGNOSIS — E87.0 HYPERNATREMIA: ICD-10-CM

## 2021-09-15 DIAGNOSIS — R32 DERMATITIS ASSOCIATED WITH INCONTINENCE: ICD-10-CM

## 2021-09-15 DIAGNOSIS — L25.8 DERMATITIS ASSOCIATED WITH INCONTINENCE: ICD-10-CM

## 2021-09-15 DIAGNOSIS — N39.0 RECURRENT UTI: ICD-10-CM

## 2021-09-15 DIAGNOSIS — R13.10 DYSPHAGIA, UNSPECIFIED TYPE: ICD-10-CM

## 2021-09-15 PROCEDURE — 99305 1ST NF CARE MODERATE MDM 35: CPT | Performed by: NURSE PRACTITIONER

## 2021-09-15 PROCEDURE — 99309 SBSQ NF CARE MODERATE MDM 30: CPT | Performed by: NURSE PRACTITIONER

## 2021-09-15 NOTE — ASSESSMENT & PLAN NOTE
Recently hospitalized with progressive functional decline and diagnosed with COVID-19 infection   Patient was treated with remdesivir and dexamethasone  - under the care of [facility]

## 2021-09-15 NOTE — ASSESSMENT & PLAN NOTE
Recently hospitalized with progressive functional decline and diagnosed with COVID-19 infection   Patient was treated with remdesivir and dexamethasone  Respiratory status is stable on room air  Will continue to monitor closely

## 2021-09-15 NOTE — ASSESSMENT & PLAN NOTE
Right buttock  - partial thickness  - local wound care with venelex  - offload  - follow up next week

## 2021-09-15 NOTE — ASSESSMENT & PLAN NOTE
Wound on the Right great toe, right 5th toe, left 5th toe, left great toe  - eschar and fluid filled blister  - no obvious sign of infection  - etiology : possible COVID toes, venous ulcer ( patient have edema on both feet), or arterial ulcer  - will provide local wound care using betadine  - if worsened, will order arterial and venous duplex, depending on the plan of care for the patients  - as per medical record review, the patient's health is declining

## 2021-09-15 NOTE — PROGRESS NOTES
Facility: [facility]  POS: 31 (STR)  Progress Note    Chief Complaint/Reason for visit: STR follow up  Code status: Full Code  History of Present Illness: 53-year-old female seen and examined for STR follow up  Patient  Was recently hospitalized with  COVID-19 infection with transaminitis attributed to COVID-19 infection  Patient is currently sharing room with her mother who is also currently a patient at SNF  Patient requires complete assist with ADLs  Respiratory status is stable  Past Medical History: unchanged from history and physical  Past Medical History:   Diagnosis Date    Arthritis     Blood pressure instability UNKNOWN    Bowel obstruction (HCC)     intestine    Bradycardia     Cerebral palsy (HCC)     Chronic edema     Closed fracture of coccyx (HCC)     DVT (deep venous thrombosis) ([facility] [address] )     US Doppler done 10/2015 and 3/2016    GERD (gastroesophageal reflux disease)     History of Doppler ultrasound     of UE and LE; 10/2015 and 3/2016 DVT    History of echocardiogram 03/2016    EF 55%  Trace mitral and tricuspid regurgitation  PA systolic pressure 33  Echo Doppler 2/2017- EF 60%   History of EKG 10/20/2016    Normal sinus rhythm with sinus arrhythmia  Abnormal ECG     History of Holter monitoring 11/2017    14 DAY MONITOR Baseline rhythm was of sinus origin with rare APCs and VPCs and a few baseline motion artifact  NO OTHER ARRHYTHMIAS  No symptoms was reported in the attached diary   History of Holter monitoring 10/2016    Sinus rhythm  There were a few episodes of asymptomatic sinus arrhythmia  There was sinus bradycardia at rate as low as 47 bpm  There were rare single PACs and PVCs       Hypertension     Hyperthyroidism     Intellectual disability     Kidney stone     Lymphedema     Pneumonia     Stroke ([facility] [address] )     UNCERTAIN OF TYPE/NON VERBAL    Tachycardia     Thyroid disease     Urinary incontinence     Urinary tract infection      Family History: unchanged from history and physical  Social History: unchanged from history and physical  Resident Since: 9/10/2021  Review of systems: Review of Systems   Unable to perform ROS: Other   Patient is unable to carry out any effective communication due to underlying cognitive developmental delay  Medications: All medication and routine orders were reviewed and updated  Allergies: Reviewed and unchanged  Consults reviewed: Other  Labs/Diagnostics (reviewed by this provider): Copy in Chart    Imaging Reviewed: None today    Physical Exam    Weight:  Temp:  98 1   BP: 109/73 Pulse: 93    Resp: 18  O2 Sat: 93% RA  Constitutional: Normocephalic  Orientation:  Developmental delay     Physical Exam  Vitals and nursing note reviewed  Constitutional:       General: She is not in acute distress  Appearance: She is not toxic-appearing or diaphoretic  HENT:      Head: Normocephalic  Nose: No congestion or rhinorrhea  Mouth/Throat:      Mouth: Mucous membranes are moist       Pharynx: No oropharyngeal exudate  Eyes:      General: No scleral icterus  Right eye: No discharge  Left eye: No discharge  Extraocular Movements: Extraocular movements intact  Conjunctiva/sclera: Conjunctivae normal       Pupils: Pupils are equal, round, and reactive to light  Cardiovascular:      Rate and Rhythm: Normal rate and regular rhythm  Pulmonary:      Effort: Pulmonary effort is normal  No respiratory distress  Comments: No audible wheezes, rhonchi, or rales noted  Abdominal:      General: There is no distension  Palpations: Abdomen is soft  Tenderness: There is no abdominal tenderness  There is no guarding  Musculoskeletal:         General: Deformity present  Cervical back: Neck supple  No rigidity  Right lower leg: Edema present  Left lower leg: Edema present  Lymphadenopathy:      Cervical: No cervical adenopathy  Skin:     General: Skin is warm and dry        Capillary Refill: Capillary refill takes less than 2 seconds  Comments: Multiple wounds  on toes right foot and left foot   Neurological:      Mental Status: She is alert  Mental status is at baseline  Psychiatric:      Comments: Patient becomes nervous and anxious when disturbed       Assessment/Plan:  19-year-old female with:     COVID-19 virus infection  Recently hospitalized with progressive functional decline and diagnosed with COVID-19 infection  Patient was treated with remdesivir and dexamethasone  Respiratory status is stable on room air  Will continue to monitor closely    MARIA ELENA (acute kidney injury) ([facility] [address] )  With recent hospitalization most likely due to dehydration  Most recent creatinine 0 72, BUN 27  Ensure adequate hydration  Avoid hypotension    Hypernatremia  In setting of dehydration  Recent sodium level 141   Monitor BMP periodically    Dysphagia  Continue modified diet  Assist patient with all meals  Speech therapy  Continue aspiration precautions    Skin ulcers of both feet (HCC)  Ulcers involving great toes and 5th toes both feet  Consult [facility] wound CRNP    Physical deconditioning  Multifactorial  Continue care and support at SNF for ADLs  Continue PT/OT/ST  Continue fall precautions  Ensure adequate hydration and nutrition    This note was completed in part utilizing m-[employer] fluency direct voice recognition software  Grammatical errors, random word insertion, spelling mistakes, and incomplete sentences may be an occasional consequence of the system secondary to software limitations, ambient noise and hardware issues  At the time of dictation, efforts were made to edit, clarify and/or correct errors  Please read the chart carefully and recognize, using context, where substitutions have occurred  If you have any questions or concerns about the context, text or information contained within the body of this dictation, please contact myself, the provider, for further clarification      Ilya Davenport Sally Rboles [facility]  4/45/66141:20 PM

## 2021-09-15 NOTE — PROGRESS NOTES
[address] MANAGEMENT   AND HYPERBARIC MEDICINE CENTER       Patient ID: Rosmery Carrasco is a 63 y o  female Date of Birth 1965     Location of Service: [facility]    Performed wound round with: Nurse manager, DON      Chief Complaint   Patient presents with   [facility] [address] Patient Visit     Right and left toes, right buttock       Wound Instructions:  Orders Placed This Encounter   Procedures    Wound cleansing and dressings     Wound:  Buttocks  Discontinue previous wound order  Cleanse the wound bed with soap and water, pat dry  Apply venelex to wound bed  Frequency : twice a day and prn for soiling    Location : All the wounds on the toes  Cleanse with NSS  Apply betadine to wound bed and cover with DSD and wrap with kerlix  Insert lamb wool in between each toes  Daily and prn for soiling    Offload all wounds  Order air mattress  Turn and reposition frequently, maximum of every two hours  Instruct / Assist with weight shifting every 15 - 20 minutes when in chair  Increase protein intake  Monitor for any sign of infection or worsening, inform PCP or patient's primary physician in your facility  Standing Status:   Future     Standing Expiration Date:   9/15/2022       Allergies  Bethanechol, Metoclopramide, and Sulfamethoxazole-trimethoprim      Assessment & Plan:  1  COVID-19 virus infection  Assessment & Plan:  Recently hospitalized with progressive functional decline and diagnosed with COVID-19 infection  Patient was treated with remdesivir and dexamethasone  - [facility]    Orders:  -     Wound cleansing and dressings; Future    2  Dermatitis associated with incontinence  Assessment & Plan:  Right buttock  - partial thickness  - local wound care with venelex  - offload  - follow up next week    Orders:  -     Wound cleansing and dressings; Future    3   Skin ulcers of both feet (HCC)  Assessment & Plan:  Wound on the Right great toe, right 5th toe, left 5th toe, left great toe  - eschar and fluid filled blister  - no obvious sign of infection  - etiology : possible COVID toes, venous ulcer ( patient have edema on both feet), or arterial ulcer  - will provide local wound care using betadine  - if worsened, will order arterial and venous duplex, depending on the plan of care for the patients  - as per medical record review, the patient's health is declining  Orders:  -     Wound cleansing and dressings; Future           Subjective:   Disease a 50-year-old female referred to our service for multiple wounds on the right and left toes and right buttock  Patient was recently admitted in acute care for COVID and was discharged to skilled nursing facility for short-term  As per report, although wounds were present on admission at skilled nursing facility  The patient had history of cerebral palsy and cognitive delay and was not able to provide information related to the wound  The patient's mother was present during the visit, and was not able to provide information also on where the  wound came from and when it started  As for medical record review, the patient have a poor appetite and the health is declining  Review of Systems   Reason unable to perform ROS: was not able to provide information due to cognitive delay  Objective: There were no vitals taken for this visit  Physical Exam  Constitutional:       Appearance: Normal appearance  HENT:      Head: Normocephalic and atraumatic  Nose: Nose normal    Eyes:      Conjunctiva/sclera: Conjunctivae normal    Cardiovascular:      Rate and Rhythm: Normal rate  Pulmonary:      Effort: Pulmonary effort is normal    Abdominal:      Palpations: Abdomen is soft  Tenderness: There is no abdominal tenderness  There is no guarding  Musculoskeletal:         General: No tenderness  Cervical back: Normal range of motion  Right lower leg: Edema present  Left lower leg: Edema present  Comments: LROM  + 3 edema on bilateral feet  DP and PT non palpable due to edema   Skin:     General: Skin is warm  Findings: Lesion present  Comments: Location Right buttock wound bed - 2 5 x 2 0 x 0 1 cm , no undermining, no tunneling, 100 % epithelial, 0%granulation, 0%slough, exudate - no drainage, no malodor ( assess after dressing removal and cleansing), wound edge - attached to base, periwound - intact  No localized sign of infection, Denies pain    Right great toe - 2 0 x 2 0 x 0 1 cm, 100% eschar  Right 5th toe - 2 0 x 1 0 x 1 0 cm, intact fluid filled blister    Left 5th toe - 3 0 x 2 0 x 0 1 cm, 100% eschar  Left great toe ( anterior) - 2 5 x 2 0 x 0 1 cm, 50% eschar, 10% epithelial  Left great toe ( distal tip) - 0 5 x 0 1 x 0 1 cm, fluid filled blister    All wounds on the bilateral toes does not have obvious sign of infection     Neurological:      Mental Status: She is alert  Gait: Gait abnormal    Psychiatric:         Speech: She is noncommunicative  Judgment: Judgment is not impulsive  Procedures           Patient's care was coordinated with nursing facility staff  Recent vitals, labs and updated medications were reviewed on EMR or chart system of facility   Past Medical, surgical, social, medication and allergy history and patient's previous records were reviewed and updated as appropriate:     Patient Active Problem List   Diagnosis    Neuromuscular dysfunction of bladder    Recurrent UTI    Urinary incontinence    Acute vaginitis    COVID-19 virus infection    MARIA ELENA [facility]) ([facility] [address] )    Hypernatremia    Dysphagia    Skin ulcers of both feet ([facility] [address] )    Physical deconditioning    Dermatitis associated with incontinence     Past Medical History:   Diagnosis Date    Arthritis     Blood pressure instability UNKNOWN    Bowel obstruction (HCC)     intestine    Bradycardia     Cerebral palsy (HCC)     Chronic edema     Closed fracture of coccyx ([facility] [address] )     [facility]     US Doppler done 10/2015 and 3/2016    GERD (gastroesophageal reflux disease)     History of Doppler ultrasound     of UE and LE; 10/2015 and 3/2016 DVT    History of echocardiogram 2016    EF 55%  Trace mitral and tricuspid regurgitation  PA systolic pressure 33  Echo Doppler 2017- EF 60%   History of EKG 10/20/2016    Normal sinus rhythm with sinus arrhythmia  Abnormal ECG     History of Holter monitoring 2017    14 DAY MONITOR Baseline rhythm was of sinus origin with rare APCs and VPCs and a few baseline motion artifact  NO OTHER ARRHYTHMIAS  No symptoms was reported in the attached diary   History of Holter monitoring 10/2016    Sinus rhythm  There were a few episodes of asymptomatic sinus arrhythmia  There was sinus bradycardia at rate as low as 47 bpm  There were rare single PACs and PVCs       Hypertension     Hyperthyroidism     Intellectual disability     Kidney stone     Lymphedema     Pneumonia     Stroke ([facility] )     UNCERTAIN OF TYPE/NON VERBAL    Tachycardia     Thyroid disease     Urinary incontinence     Urinary tract infection      Past Surgical History:   Procedure Laterality Date    ABDOMINAL HYSTERECTOMY      COLONOSCOPY      EYE SURGERY      HYSTERECTOMY      HYSTERECTOMY      IR DRAINAGE TUBE PLACEMENT  2020    SMALL INTESTINE SURGERY       Social History     Socioeconomic History    Marital status: Single     Spouse name: None    Number of children: 0    Years of education: None    Highest education level: None   Occupational History    Occupation: Disabled   Tobacco Use    Smoking status: Never Smoker    Smokeless tobacco: Never Used   Vaping Use    Vaping Use: Never used   Substance and Sexual Activity    Alcohol use: No    Drug use: No    Sexual activity: Never   Other Topics Concern    None   Social History Narrative    · Most recent tobacco use screenin2020      · Do you currently or have you served in the [employer]:   No      · Were you activated, into active duty, as a member of the [employer] or as a Reservist:   No       · Live alone or with others:   with others      · Number of children:   0      · Siblings:   yes        · Caffeine intake:   Occasional      · Diet:   Specific      · Exercise level: Moderate      · Family history of heart disease:   No      · Overweight:   No      · Obese:   No      · Advance directive:   No      · Blood Transfusion:   No       · High cholesterol:   No      · High blood pressure:   No      · Diabetes:   No      · General stress level:   Low      · Guns present in home:   No      · Seat belts used routinely:   Yes      · Seat belt/car seat used routinely:   Yes      · Do you have regular medical check ups:   Yes      Social Determinants of Health     Financial Resource Strain:     Difficulty of Paying Living Expenses:    Food Insecurity:     Worried About Running Out of Food in the Last Year:     Ran Out of Food in the Last Year:    Transportation Needs:     Lack of Transportation (Medical):      Lack of Transportation (Non-Medical):    Physical Activity:     Days of Exercise per Week:     Minutes of Exercise per Session:    Stress:     Feeling of Stress :    Social Connections:     Frequency of Communication with Friends and Family:     Frequency of Social Gatherings with Friends and Family:     Attends Gnosticist Services:     Active Member of Clubs or Organizations:     Attends Club or Organization Meetings:     Marital Status:    Intimate Partner Violence:     Fear of Current or Ex-Partner:     Emotionally Abused:     Physically Abused:     Sexually Abused:         Current Outpatient Medications:     aspirin 81 mg chewable tablet, Chew 81 mg daily, Disp: , Rfl:     bisacodyl (DULCOLAX) 5 mg EC tablet, Take 3 tablets (15 mg total) by mouth once as needed for constipation for up to 5 doses, Disp: 15 tablet, Rfl: 0    dexamethasone (DECADRON) 6 mg tablet, Take 6 mg by mouth 2 (two) times a day with meals  X  6 doses, Disp: , Rfl:     Garlic 4333 MG CAPS, Take by mouth, Disp: , Rfl:     lansoprazole (PREVACID) 30 mg capsule, TAKE 1 CAPSULE BY MOUTH EVERY DAY, Disp: 90 capsule, Rfl: 3    levothyroxine 150 mcg tablet, Take 1 tablet (150 mcg total) by mouth daily, Disp: 90 tablet, Rfl: 1    Linzess 145 MCG CAPS, TAKE 1 CAPSULE BY MOUTH EVERY DAY, Disp: 90 capsule, Rfl: 1    meloxicam (MOBIC) 7 5 mg tablet, Take 7 5 mg by mouth daily, Disp: , Rfl:     methocarbamol (ROBAXIN) 500 mg tablet, Take 500 mg by mouth 4 (four) times a day as needed, Disp: , Rfl:     senna-docusate sodium (SENOKOT-S) 8 6-50 mg per tablet, Take 1 tablet by mouth, Disp: , Rfl:     valsartan (DIOVAN) 320 MG tablet, TAKE 1 TABLET BY MOUTH EVERY DAY, Disp: 90 tablet, Rfl: 1  Family History   Problem Relation Age of Onset    Cancer Father     Colon cancer Father     Asthma Mother     Stroke Mother     WESTLEY disease Mother     Arthritis Mother     Diabetes type II Mother     Mitral valve prolapse Mother     Irritable bowel syndrome Mother     Heart disease Mother               Coordination of Care: Nurse manager aware of the treatment plan    Patient / Staff education :  Staff was given education on sign of infection and pressure ulcer prevention  Staff verbalized understanding     Follow up :  Return in about 1 week (around 9/22/2021)  Voice-recognition software may have been used in the preparation of this document  Occasional wrong word or "sound-alike" substitutions may have occurred due to the inherent limitations of voice recognition software  Interpretation should be guided by context        JONATHAN Resendez

## 2021-09-15 NOTE — ASSESSMENT & PLAN NOTE
With recent hospitalization most likely due to dehydration  Most recent creatinine 0 72, BUN 27  Ensure adequate hydration  Avoid hypotension

## 2021-09-15 NOTE — ASSESSMENT & PLAN NOTE
Multifactorial  Continue care and support at SNF for ADLs  Continue PT/OT/ST  Continue fall precautions  Ensure adequate hydration and nutrition

## 2021-09-15 NOTE — ASSESSMENT & PLAN NOTE
Continue modified diet  Assist patient with all meals  Speech therapy  Continue aspiration precautions

## 2021-09-15 NOTE — PATIENT INSTRUCTIONS
Orders Placed This Encounter   Procedures    Wound cleansing and dressings     Wound:  Buttocks  Discontinue previous wound order  Cleanse the wound bed with soap and water, pat dry  Apply venelex to wound bed  Frequency : twice a day and prn for soiling    Location : All the wounds on the toes  Cleanse with NSS  Apply betadine to wound bed and cover with DSD and wrap with kerlix  Insert lamb wool in between each toes  Daily and prn for soiling    Offload all wounds  Order air mattress  Turn and reposition frequently, maximum of every two hours  Instruct / Assist with weight shifting every 15 - 20 minutes when in chair  Increase protein intake  Monitor for any sign of infection or worsening, inform PCP or patient's primary physician in your facility       Standing Status:   Future     Standing Expiration Date:   9/15/2022

## 2021-09-20 ENCOUNTER — NURSING HOME VISIT (OUTPATIENT)
Dept: GERIATRICS | Facility: OTHER | Age: 56
End: 2021-09-20
Payer: MEDICARE

## 2021-09-20 DIAGNOSIS — G93.41 ACUTE METABOLIC ENCEPHALOPATHY: ICD-10-CM

## 2021-09-20 DIAGNOSIS — E03.9 HYPOTHYROIDISM, UNSPECIFIED TYPE: ICD-10-CM

## 2021-09-20 DIAGNOSIS — I10 ESSENTIAL HYPERTENSION: ICD-10-CM

## 2021-09-20 DIAGNOSIS — G80.9 CEREBRAL PALSY, UNSPECIFIED TYPE (HCC): ICD-10-CM

## 2021-09-20 DIAGNOSIS — L97.529 SKIN ULCERS OF BOTH FEET (HCC): ICD-10-CM

## 2021-09-20 DIAGNOSIS — R32 DERMATITIS ASSOCIATED WITH INCONTINENCE: ICD-10-CM

## 2021-09-20 DIAGNOSIS — K59.09 CHRONIC CONSTIPATION: ICD-10-CM

## 2021-09-20 DIAGNOSIS — K21.9 GASTROESOPHAGEAL REFLUX DISEASE WITHOUT ESOPHAGITIS: ICD-10-CM

## 2021-09-20 DIAGNOSIS — U07.1 COVID-19 VIRUS INFECTION: Primary | ICD-10-CM

## 2021-09-20 DIAGNOSIS — R53.81 PHYSICAL DECONDITIONING: ICD-10-CM

## 2021-09-20 DIAGNOSIS — N39.0 RECURRENT UTI: ICD-10-CM

## 2021-09-20 DIAGNOSIS — L97.519 SKIN ULCERS OF BOTH FEET (HCC): ICD-10-CM

## 2021-09-20 DIAGNOSIS — K59.00 CONSTIPATION, UNSPECIFIED CONSTIPATION TYPE: ICD-10-CM

## 2021-09-20 DIAGNOSIS — L25.8 DERMATITIS ASSOCIATED WITH INCONTINENCE: ICD-10-CM

## 2021-09-20 PROCEDURE — 99316 NF DSCHRG MGMT 30 MIN+: CPT | Performed by: NURSE PRACTITIONER

## 2021-09-20 RX ORDER — MELOXICAM 7.5 MG/1
7.5 TABLET ORAL DAILY
Qty: 7 TABLET | Refills: 0 | Status: SHIPPED | OUTPATIENT
Start: 2021-09-22 | End: 2021-10-12

## 2021-09-20 RX ORDER — ACETAMINOPHEN 325 MG/1
650 TABLET ORAL EVERY 6 HOURS PRN
COMMUNITY
End: 2022-02-25

## 2021-09-20 RX ORDER — SENNA PLUS 8.6 MG/1
1 TABLET ORAL DAILY
COMMUNITY
End: 2021-09-20 | Stop reason: SDUPTHER

## 2021-09-20 RX ORDER — CASTOR OIL AND BALSAM, PERU 788; 87 MG/G; MG/G
1 OINTMENT TOPICAL 2 TIMES DAILY
Qty: 30 G | Refills: 1 | Status: SHIPPED | OUTPATIENT
Start: 2021-09-22 | End: 2021-10-12

## 2021-09-20 RX ORDER — CASTOR OIL AND BALSAM, PERU 788; 87 MG/G; MG/G
1 OINTMENT TOPICAL 2 TIMES DAILY
COMMUNITY
End: 2021-09-20 | Stop reason: SDUPTHER

## 2021-09-20 RX ORDER — SENNA PLUS 8.6 MG/1
1 TABLET ORAL DAILY
Qty: 7 TABLET | Refills: 0 | Status: SHIPPED | OUTPATIENT
Start: 2021-09-22 | End: 2021-10-12

## 2021-09-20 RX ORDER — ASPIRIN 81 MG/1
81 TABLET, CHEWABLE ORAL DAILY
Qty: 7 TABLET | Refills: 0 | Status: SHIPPED | OUTPATIENT
Start: 2021-09-22 | End: 2021-10-12

## 2021-09-20 RX ORDER — BISACODYL 10 MG
10 SUPPOSITORY, RECTAL RECTAL DAILY PRN
COMMUNITY

## 2021-09-20 NOTE — PROGRESS NOTES
[facility]  [address]  [phone]  Facility: [facility]  POS: 31: SNF/Short Term Rehab      ** Addendum: Patient discharge to home cancelled on 9/10/2021  Will remain at [facility] for STR  Will convert discharge note to regular visit note  NAME: Abelardo Freeman  AGE: 63 y o  SEX: female  DATE OF ADMISSION: September 10, 2021    Reason for admission: Patient was admitted from [facility] for rehabilitation after hospitalization for ambulatory dysfunction and deconditioning  History of Present Illness: This is a 43-year-old female patient currently admitted at [place] [address] (9/10/2021 to present) following discharge from acute care hospitalization H  [facility]) with Dx of Acute Toxic Metabolic Encephalopathy attributed to COVID-19 virus infection, Chronic Constipation, COVID-19 virus Infection, Progressive functional decline and generalized weakness  Prior to this hospitalization patient was evaluated at [facility] and was treated for UTI  Admission Diagnoses:  * Physical deconditioning  * COVID-19 virus infection  * Acute Toxic Metabolic Encephalopathy  * CP  * Chronic Constipation  * Hypothyroidism  * HTN  * Recurrent UTI  * Skin Ulcers to both feet  * MASD    PT Progress Note: Per PT/ OT progress note, patient is:     * Maximum assistance for:  - bed mobility  - transfers    * Dependent for:  - upper body dressing  - lower body dressing  - toileting    ** Poor balance   Currently Non ambulatory    Labs and testing performed and reviewed during stay: Most recent lab done to date while at STR:     * CBC NO DIFF (9/15/2021):   HB 7 g/dL  (L)   HCT: 26 3 % (L)   WBC: 9 9 thou/cmm   RBC: 2 94 mill/cmm (L)   PLATELET: 359 thou/cmm   MPV: 8 4 fL   MCV: 90 fL   MCH: 29 7 pg   MCHC: 33 2 g/dL   RDW: 14 4 %      * BMP (9/15/2021):   GLU: 92 mg/dL   BUN: 27 mg/dL (H)   CREA: 0 72 mg/dL   NA: 141 mmol/L   K: 4 5 mmol/L   CL: 107 mmol/L   CO: 24 mmol/L   CA: 8 6 mg/dL   ANION GAP: 10   eGFR, NON  AMER: 94   eGFR,  AMER: 109    HISTORY:  Medical Hx: Reviewed, unchanged  Family Hx: Reviewed, unchanged  Soc Hx: Reviewed,  unchanged    ALLERGY: Reviewed and unchanged  Allergies   Allergen Reactions    Bethanechol      Reaction Date: 10Dec2007; SEIZURES      Metoclopramide      Reaction Date: 10Dec2007; SEIZURES      Sulfamethoxazole-Trimethoprim      Reaction Date: 10Dec2007; SEIZURES       Review of Systems   Unable to perform ROS: Patient nonverbal (Unable to assess)     Today's Visit: 9/20/202110:04 PM    Subjective: Non Verbal    Vitals: T97 8F -P106 -R18 BP: 94/80 SpO2: 95% RA  Weight: 138 2 lbs (9/14/2021) <= 138 8 lbs (/10/2021)    Exam: Physical Exam  Vitals and nursing note reviewed  Constitutional:       General: She is not in acute distress  Appearance: Normal appearance  She is normal weight  She is not ill-appearing, toxic-appearing or diaphoretic  HENT:      Head: Normocephalic and atraumatic  Right Ear: Tympanic membrane, ear canal and external ear normal  There is no impacted cerumen  Left Ear: Tympanic membrane, ear canal and external ear normal  There is no impacted cerumen  Nose: Nose normal  No congestion or rhinorrhea  Mouth/Throat:      Mouth: Mucous membranes are moist       Pharynx: Oropharynx is clear  No oropharyngeal exudate or posterior oropharyngeal erythema  Eyes:      General: No scleral icterus  Right eye: No discharge  Left eye: No discharge  Conjunctiva/sclera: Conjunctivae normal       Pupils: Pupils are equal, round, and reactive to light  Neck:      Vascular: No carotid bruit  Cardiovascular:      Rate and Rhythm: Tachycardia present  Rhythm irregular  Heart sounds: Normal heart sounds  No murmur heard  No friction rub  No gallop  Pulmonary:      Effort: Pulmonary effort is normal  No respiratory distress  Breath sounds: Normal breath sounds  No stridor   No wheezing, rhonchi or rales  Comments: Limited assessment as patient does not actively participate during assessment - based on regular breathing patterns  Chest:      Chest wall: No tenderness  Abdominal:      General: Bowel sounds are normal  There is no distension  Palpations: Abdomen is soft  There is no mass  Tenderness: There is no abdominal tenderness  There is no right CVA tenderness, left CVA tenderness, guarding or rebound  Hernia: No hernia is present  Comments: Healed scar dimple on the LLQ - likely G-tube stoma  Genitourinary:     Comments: Non distended bladder  Incontinent  Musculoskeletal:         General: No swelling, tenderness, deformity or signs of injury  Cervical back: Normal range of motion and neck supple  No rigidity or tenderness  Right lower leg: No edema  Left lower leg: No edema  Comments: Non ambulatory at this time  Per mother, patient used to walk around home  Lymphadenopathy:      Cervical: No cervical adenopathy  Skin:     General: Skin is warm  Capillary Refill: Capillary refill takes less than 2 seconds  Coloration: Skin is not jaundiced or pale  Findings: Rash present  No bruising, erythema or lesion  Comments: Multiple skin ulceration to toes of feet  MASD to B/L gluteal areas   Neurological:      Mental Status: She is alert  Comments: Non verbal  Smiles when greeted by name  Psychiatric:      Comments: Alert, calm and not in distress  Assessment and Plan:     COVID-19 virus infection  Deemed clinically stable  No febrile episodes nor hypoxia on RA  LCTA  Still on room isolation and droplet precaution  Non ambulatory status at this time  Ave meal completion: 51-75% per meal with 25% on some meal    Physical deconditioning  Per mother, patient functionally still not back to baseline  Maximum assistance per PT progress note    Continue PT/OT as out-patient service  Fall precaution    Skin ulcers of both feet [facility])  Evaluated and managed by [place]  Last seen on 9/15/2021:  Location: Right great toe, Right 5th toe / Left 5th toe, Left great toe  = eschar and fluid filled blister  No sign of infection  = assessed to be likely COVID toes versus venous ulcer versus arterial ulcer  = Recommend local wound care: Betadine swab daily  = If status worsen: Arterial and Venous duplex  Recommend follow-up with Fabiana Foss for further management upon discharge  Recurrent UTI  Recently completed oral antibiotic (Augmentin)  Afebrile  Has neurogenic bladder  Continue prompt incontinence care  To follow-up with PCP upon discharge    Acute metabolic encephalopathy  Attributed to COVID-19 virus infection  Hbg/Hct level stable (9/15/2021)  Renal functions WNL (9/15/2021)  No verbal at baseline with cognitive impairment    Chronic constipation  Bowel movements documented from 9/12-16/2021 then 9/19/2021  Continue Linzess 145mg daily + Senna 1 tablet daily  Hx of colitis and high stool colonic burden    Cerebral palsy [facility])  Mother is the primary caregiver at home  Recent onset of functional decline with current COVID-19 infection  Maximum assistance needed  Continue supportive care at home   Continue Meloxicam 7 5mg daily + Robaxin 500mg Q6 hours PRN (pain/spasticity)    Essential hypertension  BP range (9/11-20/2021) = 94/70 to 167/68  ** 3 episodes of SBP > 150 on this period  ** 2 episode of SBP < 100 on this period  HR range (9/11-20/2021) = 74 to 106/min  ** 4 episodes of HR > 100/min on this period  Continue Valsartan 320mg daily + ASA 81 mg daily  To follow-up with PCP upon discharge    Hypothyroidism  TSH (8/25/2021: [facility] Epic record): 0 11 (L)  Free T4 (8/25/2021: [facility] Epic record): 1 27 WNL)  Currently on Levothyroxine 150mcg daily  To follow-up with PCP upon discharge    Dermatitis associated with incontinence  Evaluated and managed by Fabiana Foss on 9/15/2021    Continue Venelex ointment BID until resolved  Continue to provide prompt incontinence care   Turn and reposition as often as needed  Current Medications: All medications reviewed and updated in Nursing Home Chart     CURRENT MEDICATION LIST WHILE IN NE-STR:    Current Outpatient Medications:     acetaminophen (TYLENOL) 325 mg tablet, Take 650 mg by mouth every 6 (six) hours as needed, Disp: , Rfl:     aspirin 81 mg chewable tablet, Chew 81 mg daily, Disp: , Rfl:     Balsam Peru-Castor Oil (Venelex) OINT, Apply 1 application topically 2 (two) times a day  - apply to buttocks in AM + PM until resolved, Disp: , Rfl:     bisacodyl (DULCOLAX) 10 mg suppository, Insert 10 mg into the rectum daily as needed, Disp: , Rfl:     Garlic 7367 MG CAPS, Take 1 capsule by mouth daily, Disp: , Rfl:     lansoprazole (PREVACID) 30 mg capsule, TAKE 1 CAPSULE BY MOUTH EVERY DAY, Disp: 90 capsule, Rfl: 3    levothyroxine 150 mcg tablet, Take 1 tablet (150 mcg total) by mouth daily, Disp: 90 tablet, Rfl: 1    Linzess 145 MCG CAPS, TAKE 1 CAPSULE BY MOUTH EVERY DAY, Disp: 90 capsule, Rfl: 1    magnesium hydroxide (MILK OF MAGNESIA) 400 mg/5 mL oral suspension, Take 30 mL by mouth daily as needed, Disp: , Rfl:     meloxicam (MOBIC) 7 5 mg tablet, Take 7 5 mg by mouth daily, Disp: , Rfl:     methocarbamol (ROBAXIN) 500 mg tablet, Take 500 mg by mouth every 6 (six) hours as needed, Disp: , Rfl:     senna (SENOKOT) 8 6 MG tablet, Take 1 tablet by mouth daily, Disp: , Rfl:     Sodium Phosphates (FLEET ENEMA RE), Insert 1 Bottle into the rectum daily as needed, Disp: , Rfl:     valsartan (DIOVAN) 320 MG tablet, TAKE 1 TABLET BY MOUTH EVERY DAY, Disp: 90 tablet, Rfl: 1    Please note:  Voice-recognition software may have been used in the preparation of this document  Occasional wrong word or "sound-alike" substitutions may have occurred due to the inherent limitations of voice recognition software  Interpretation should be guided by context      Beena Days Joceline Shadow  9/20/202110:04 PM

## 2021-09-20 NOTE — LETTER
September 20, 2021     [facility] [id number]    Patient: Kassy Gutierres   YOB: 1965   Date of Visit: 9/20/2021       Dear Dr Valarie Miguel:    Giselle Vásquez PM! Sending to you the discharge note for Ms Ti Frye  She is scheduled for discharge back to home with family after completing STR at [facility]  on 9/22/2021  If you have questions, please do not hesitate to call me  Sincerely,      JONATHAN Baker        CC: No Recipients  Luciano Baker  9/20/2021 10:06 PM  Sign when Signing Visit  [facility]  [address]  [phone] 32: [place]  POS: 31: SNF/Short Term Rehab      NAME: Kassy Gutierres  AGE: 63 y o  SEX: female  DATE OF ADMISSION: September 10, 2021  DATE OF DISCHARGE: September 22, 2021  DISCHARGE DISPOSITION: TO HOME    Reason for admission: Patient was admitted from [facility] for rehabilitation after hospitalization for ambulatory dysfunction and deconditioning  This is a 69-year-old female patient currently admitted at [place] [address] (9/10/2021 to present) following discharge from acute care hospitalization H  [facility]) with Dx of Acute Toxic Metabolic Encephalopathy attributed to COVID-19 virus infection, Chronic Constipation, COVID-19 virus Infection, Progressive functional decline and generalized weakness  Prior to this hospitalization patient was evaluated at [facility] and was treated for UTI  Admission Diagnoses: COVID-19 virus infection  Discharge Diagnoses:     * Physical deconditioning  * COVID-19 virus infection  * Acute Toxic Metabolic Encephalopathy  * CP  * Chronic Constipation  * Hypothyroidism  * HTN  * Recurrent UTI  * Skin Ulcers to both feet  * MASD    Course of stay: Patient was admitted to [facility] for rehabilitation due to ambulatory dysfunction and deconditioning   Patient received 24/7 SNF supportive care, PT/OT/ST services  To date, patient stay in [facility] has been uneventful  Patient seen and managed by [address] for Ulcerations to toes of feet - suspected to be COVID toes  Per , patient is scheduled for discharge to home with mother (primary caregiver) on 2021  PT Progress Note: Per PT/ OT progress note, patient is:     * Maximum assistance for:  - bed mobility  - transfers    * Dependent for:  - upper body dressing  - lower body dressing  - toileting    ** Poor balance  Currently Non ambulatory    Labs and testing performed during stay: Copies of laboratory/ imaging/ consult will be provided to PCP upon discharge  * CBC NO DIFF (9/15/2021):   HB 7 g/dL  (L)   HCT: 26 3 % (L)   WBC: 9 9 thou/cmm   RBC: 2 94 mill/cmm (L)   PLATELET: 496 thou/cmm   MPV: 8 4 fL   MCV: 90 fL   MCH: 29 7 pg   MCHC: 33 2 g/dL   RDW: 14 4 %      * BMP (9/15/2021):   GLU: 92 mg/dL   BUN: 27 mg/dL (H)   CREA: 0 72 mg/dL   NA: 141 mmol/L   K: 4 5 mmol/L   CL: 107 mmol/L   CO: 24 mmol/L   CA: 8 6 mg/dL   ANION GAP: 10   eGFR, NON  AMER: 94   eGFR,  AMER: 109    Discharge Medications: See discharge medication list which was reviewed and signed  Status at time of discharge: Stable    Today's Visit: 110:04 PM    Subjective: Non Verbal    Vitals: T97 8F -P106 -R18 BP: 94/80 SpO2: 95% RA  Weight: 138 2 lbs (2021) <= 138 8 lbs (/10/2021)    Review of Systems   Unable to perform ROS: Patient nonverbal (Unable to assess)     Exam: Physical Exam  Vitals and nursing note reviewed  Constitutional:       General: She is not in acute distress  Appearance: Normal appearance  She is normal weight  She is not ill-appearing, toxic-appearing or diaphoretic  HENT:      Head: Normocephalic and atraumatic  Right Ear: Tympanic membrane, ear canal and external ear normal  There is no impacted cerumen        Left Ear: Tympanic membrane, ear canal and external ear normal  There is no impacted cerumen  Nose: Nose normal  No congestion or rhinorrhea  Mouth/Throat:      Mouth: Mucous membranes are moist       Pharynx: Oropharynx is clear  No oropharyngeal exudate or posterior oropharyngeal erythema  Eyes:      General: No scleral icterus  Right eye: No discharge  Left eye: No discharge  Conjunctiva/sclera: Conjunctivae normal       Pupils: Pupils are equal, round, and reactive to light  Neck:      Vascular: No carotid bruit  Cardiovascular:      Rate and Rhythm: Tachycardia present  Rhythm irregular  Heart sounds: Normal heart sounds  No murmur heard  No friction rub  No gallop  Pulmonary:      Effort: Pulmonary effort is normal  No respiratory distress  Breath sounds: Normal breath sounds  No stridor  No wheezing, rhonchi or rales  Comments: Limited assessment as patient does not actively participate during assessment - based on regular breathing patterns  Chest:      Chest wall: No tenderness  Abdominal:      General: Bowel sounds are normal  There is no distension  Palpations: Abdomen is soft  There is no mass  Tenderness: There is no abdominal tenderness  There is no right CVA tenderness, left CVA tenderness, guarding or rebound  Hernia: No hernia is present  Comments: Healed scar dimple on the LLQ - likely G-tube stoma  Genitourinary:     Comments: Non distended bladder  Incontinent  Musculoskeletal:         General: No swelling, tenderness, deformity or signs of injury  Cervical back: Normal range of motion and neck supple  No rigidity or tenderness  Right lower leg: No edema  Left lower leg: No edema  Comments: Non ambulatory at this time  Per mother, patient used to walk around home  Lymphadenopathy:      Cervical: No cervical adenopathy  Skin:     General: Skin is warm  Capillary Refill: Capillary refill takes less than 2 seconds  Coloration: Skin is not jaundiced or pale  Findings: Rash present  No bruising, erythema or lesion  Comments: Multiple skin ulceration to toes of feet  MASD to B/L gluteal areas   Neurological:      Mental Status: She is alert  Comments: Non verbal  Smiles when greeted by name  Psychiatric:      Comments: Alert, calm and not in distress  Discussion with patient/family and further instructions:  -Fall precautions  -Aspiration precautions  -Bleeding precautions  -Monitor for signs/symptoms of infection  -Medication list was reviewed and signed  -DME form was completed    Follow-up Recommendations:     * Please follow-up with your primary care physician within 7-10 days of discharge to review medication changes and current status  The facility SW is still setting -up an appointment for your CLARICE WEN with your PCP (Dr Mateusz Sams: [phone]/ Fax: [phone]) and will update you of the specific time and date prior to leaving the facility  Please make every effort to attend this appointment  Should there be a change in your medical condition and an earlier appointment is needed please call the number provided above  * Recommend PT/OT as an out-patient service to continue strengthening, gait, balance and overall functional independence improvement gained during in-patient rehabilitation  * Recommend VNA to assist in medication management and other skilled nursing needs during transition to home  Problem List Follow-up Recommendations:    COVID-19 virus infection  Deemed clinically stable  No febrile episodes nor hypoxia on RA  LCTA  Still on room isolation and droplet precaution  Non ambulatory status at this time  Ave meal completion: 51-75% per meal with 25% on some meal    Physical deconditioning  Per mother, patient functionally still not back to baseline  Maximum assistance per PT progress note  Continue PT/OT as out-patient service  Fall precaution    Skin ulcers of both feet ([facility] [address] )  Evaluated and managed by [place]  Last seen on 9/15/2021:  Location: Right great toe, Right 5th toe / Left 5th toe, Left great toe  = eschar and fluid filled blister  No sign of infection  = assessed to be likely COVID toes versus venous ulcer versus arterial ulcer  = Recommend local wound care: Betadine swab daily  = If status worsen: Arterial and Venous duplex  Recommend follow-up with Fabiana Foss for further management upon discharge  Recurrent UTI  Recently completed oral antibiotic (Augmentin)  Afebrile  Has neurogenic bladder  Continue prompt incontinence care  To follow-up with PCP upon discharge    Acute metabolic encephalopathy  Attributed to COVID-19 virus infection  Hbg/Hct level stable (9/15/2021)  Renal functions WNL (9/15/2021)  No verbal at baseline with cognitive impairment    Chronic constipation  Bowel movements documented from 9/12-16/2021 then 9/19/2021  Continue Linzess 145mg daily + Senna 1 tablet daily  Hx of colitis and high stool colonic burden    Cerebral palsy [facility])  Mother is the primary caregiver at home  Recent onset of functional decline with current COVID-19 infection  Maximum assistance needed  Continue supportive care at home   Continue Meloxicam 7 5mg daily + Robaxin 500mg Q6 hours PRN (pain/spasticity)    Essential hypertension  BP range (9/11-20/2021) = 94/70 to 167/68  ** 3 episodes of SBP > 150 on this period  ** 2 episode of SBP < 100 on this period  HR range (9/11-20/2021) = 74 to 106/min  ** 4 episodes of HR > 100/min on this period  Continue Valsartan 320mg daily + ASA 81 mg daily  To follow-up with PCP upon discharge    Hypothyroidism  TSH (8/25/2021: [facility] Epic record): 0 11 (L)  Free T4 (8/25/2021: [facility] Epic record): 1 27 WNL)  Currently on Levothyroxine 150mcg daily  To follow-up with PCP upon discharge    Dermatitis associated with incontinence  Evaluated and managed by Fabiana Foss on 9/15/2021  Continue Venelex ointment BID until resolved    Continue to provide prompt incontinence care   Turn and reposition as often as needed  CURRENT MEDICATION LIST WHILE IN NE-STR:    Current Outpatient Medications:     acetaminophen (TYLENOL) 325 mg tablet, Take 650 mg by mouth every 6 (six) hours as needed, Disp: , Rfl:     aspirin 81 mg chewable tablet, Chew 81 mg daily, Disp: , Rfl:     Balsam Peru-Castor Oil (Venelex) OINT, Apply 1 application topically 2 (two) times a day  - apply to buttocks in AM + PM until resolved, Disp: , Rfl:     bisacodyl (DULCOLAX) 10 mg suppository, Insert 10 mg into the rectum daily as needed, Disp: , Rfl:     Garlic 1915 MG CAPS, Take 1 capsule by mouth daily, Disp: , Rfl:     lansoprazole (PREVACID) 30 mg capsule, TAKE 1 CAPSULE BY MOUTH EVERY DAY, Disp: 90 capsule, Rfl: 3    levothyroxine 150 mcg tablet, Take 1 tablet (150 mcg total) by mouth daily, Disp: 90 tablet, Rfl: 1    Linzess 145 MCG CAPS, TAKE 1 CAPSULE BY MOUTH EVERY DAY, Disp: 90 capsule, Rfl: 1    magnesium hydroxide (MILK OF MAGNESIA) 400 mg/5 mL oral suspension, Take 30 mL by mouth daily as needed, Disp: , Rfl:     meloxicam (MOBIC) 7 5 mg tablet, Take 7 5 mg by mouth daily, Disp: , Rfl:     methocarbamol (ROBAXIN) 500 mg tablet, Take 500 mg by mouth every 6 (six) hours as needed, Disp: , Rfl:     senna (SENOKOT) 8 6 MG tablet, Take 1 tablet by mouth daily, Disp: , Rfl:     Sodium Phosphates (FLEET ENEMA RE), Insert 1 Bottle into the rectum daily as needed, Disp: , Rfl:     valsartan (DIOVAN) 320 MG tablet, TAKE 1 TABLET BY MOUTH EVERY DAY, Disp: 90 tablet, Rfl: 1    Discharge Statement:  * Spent more than 30 minutes discharging the patient; greater than 50% spent on physical examination of patient, answering questions, discussion of plan of care, recommendations and providing post discharge instructions  Additional time spend on other discharge related activities including documentation        JONATHAN Lord  5/25/243428:16 PM

## 2021-09-21 ENCOUNTER — TELEPHONE (OUTPATIENT)
Dept: OTHER | Facility: OTHER | Age: 56
End: 2021-09-21

## 2021-09-21 NOTE — TELEPHONE ENCOUNTER
[phone]/[id number] from [facility]/Pt is Marianne Hopsontacos  [birth year]/FYI Only-Pt had an unwitnessed fall  No Injuries or bruises at this time      Reyna Yoon was paged via [facility]

## 2021-09-21 NOTE — ASSESSMENT & PLAN NOTE
BP range (9/11-20/2021) = 94/70 to 167/68  ** 3 episodes of SBP > 150 on this period  ** 2 episode of SBP < 100 on this period  HR range (9/11-20/2021) = 74 to 106/min  ** 4 episodes of HR > 100/min on this period  Continue Valsartan 320mg daily + ASA 81 mg daily  To follow-up with PCP upon discharge

## 2021-09-21 NOTE — ASSESSMENT & PLAN NOTE
Deemed clinically stable  No febrile episodes nor hypoxia on RA  LCTA   Still on room isolation and droplet precaution  Non ambulatory status at this time  Ave meal completion: 51-75% per meal with 25% on some meal

## 2021-09-21 NOTE — ASSESSMENT & PLAN NOTE
Bowel movements documented from 9/12-16/2021 then 9/19/2021  Continue Linzess 145mg daily + Senna 1 tablet daily  Hx of colitis and high stool colonic burden

## 2021-09-21 NOTE — ASSESSMENT & PLAN NOTE
TSH (8/25/2021: [facility] Epic record): 0 11 (L)  Free T4 (8/25/2021: [facility] Epic record): 1 27 WNL)  Currently on Levothyroxine 150mcg daily  To follow-up with PCP upon discharge

## 2021-09-21 NOTE — ASSESSMENT & PLAN NOTE
Recently completed oral antibiotic (Augmentin)  Afebrile  Has neurogenic bladder  Continue prompt incontinence care    To follow-up with PCP upon discharge

## 2021-09-21 NOTE — ASSESSMENT & PLAN NOTE
[facility] Discharge Note    Date of Service: 2/19/2019  Location seen at: [facility]  Subacute / Skilled Need: Rehabilitation    PCP: Juan Kearney MD   Patient Care Team:  Juan Kearney MD as PCP - General (Internal Medicine)  Attending SNF Physician: Dr Retana    History of Present Illness:   Ashanti Lane is a 89 year old female : Today patient up and about ambulating with a 2 wheel walker independently about facility.  Ambulating 250 feet + with 2 wheel walker in therapy gym.  Independent to standby assistance with ADLs.  Vital signs stable.  Labs stable.  Patient states she is ready [facility] back [facility].  Last covered day February 20-patient has waived her right to appeal and has requested last covered day of 2/19/2019 and to return home to her apartment at [facility] in the a.m.     HISTORY  Past Medical, Social, Surgical, and Family History was reviewed.    PROBLEM LIST:  Specialty Problems     None          ADVANCE DIRECTIVES:    Power of  Status:  Activated  Code Status:  DNR  Advanced Directive Forms: discussed and on file    ALLERGIES:  ALLERGIES:   Allergen Reactions   • Codeine Other (See Comments)     unknown   • Penicillins Other (See Comments)     unknown   • Sulfa Antibiotics Other (See Comments)     unknown   • Sulfonylureas Other (See Comments)       HOME MEDICATIONS:   Current Outpatient Medications   Medication Sig Dispense Refill   • hydrOXYzine (ATARAX) 25 MG tablet Take 25 mg by mouth every 6 hours as needed.     • oxyCODONE-acetaminophen (PERCOCET) 5-325 MG per tablet Take 1 tablet by mouth every 6 hours as needed for Pain.     • aspirin 81 MG chewable tablet Chew 81 mg by mouth daily.     • bisacodyl (DULCOLAX) 10 MG suppository Place 10 mg rectally. Every 72 hours prn     • lacosamide (VIMPAT) 100 MG Tab Take 100 mg by mouth 2 times daily.     • levothyroxine (SYNTHROID, LEVOTHROID) 100 MCG tablet Take 100 mcg by mouth daily.     • metoPROLOL  Mother is the primary caregiver at home  Recent onset of functional decline with current COVID-19 infection  Maximum assistance needed    Continue supportive care at home   Continue Meloxicam 7 5mg daily + Robaxin 500mg Q6 hours PRN (pain/spasticity) succinate (TOPROL-XL) 50 MG 24 hr tablet Take 50 mg by mouth daily.     • omeprazole (PRILOSEC) 20 MG capsule Take 20 mg by mouth daily.     • polyethylene glycol (GLYCOLAX, MIRALAX) packet Take 17 g by mouth daily.       No current facility-administered medications for this visit.        SNF MEDICATIONS: Same as above, no new orders this visit    BASELINE FUNCTIONAL STATUS:  Independent and Walker    CURRENT FUNCTIONAL STATUS:  2 wheeled walker and independent  Appetite: Good    REVIEW OF SYSTEMS:  Review of Systems   Constitutional: Negative for chills, fatigue and fever.   HENT: Negative for rhinorrhea, sneezing, sore throat and trouble swallowing.    Respiratory: Negative for cough, shortness of breath and wheezing.    Cardiovascular: Negative for chest pain and leg swelling.   Gastrointestinal: Negative for constipation, diarrhea, nausea and vomiting.   Genitourinary: Negative for dysuria, frequency and urgency.   Musculoskeletal: Negative for gait problem.   Skin: Negative for color change and rash.   Neurological: Negative for dizziness, syncope, weakness, light-headedness and headaches.   Psychiatric/Behavioral: Negative for agitation, behavioral problems, confusion and suicidal ideas.      Patient denies depressive symptoms.  denies  0- Not at all  0-4 community norm - no further tx  Mild symptoms, self care information provided.     PHYSICAL ASSESSMENT:  Physical Exam   Constitutional: She is oriented to person, place, and time. She appears well-developed and well-nourished.   Cardiovascular: Normal rate and regular rhythm.   Pulmonary/Chest: Effort normal and breath sounds normal.   Abdominal: Soft. Bowel sounds are normal.   Musculoskeletal: She exhibits no edema.   Neurological: She is alert and oriented to person, place, and time.   Skin: Skin is warm and dry.   Psychiatric: She has a normal mood and affect. Her behavior is normal.   Vitals reviewed.      VITALS:  Visit Vitals  /68 (BP Location:  [place], Patient Position: Sitting)   Pulse 65   Temp 97.7 °F (36.5 °C) (Temporal)   Resp 18   Wt 59.7 kg (131 lb 9.6 oz)   SpO2 93% Comment: RA   BMI 25.70 kg/m²       Pain Location 1-2/10 ribs    LABS:  No new orders this visit    ASSESSMENT AND PLAN  1. Closed fracture of multiple ribs with flail chest with routine healing  Resolving; reminded patient to continue to splint her rib cage when coughing and deep breathing; lungs clear.  Stable    2. Multiple bruises  Bruises resolving on bilateral lower extremities.  Stable    3. HTN (hypertension), benign  Blood pressure today 147/69, pulse 65.  Continue present management.  Stable    4. ACP (advance care planning)  DNR/selective/GOC comfort      FOLLOW UP APPOINTMENTS:  Follow-up appointment with PCP 1 week post discharge: Dr. Kearney    DISCHARGE PLANNING:   Discharge Plan:  [place]  Discussed with: RN / Nursing, Family, Patient, SW, OT and PT  DC plan communicated to PCP  , PAN Standing orders utilized, PAN Admit Set orders utilized and Patient is ready to safely transition to lower level of care  Functional Status: Independent    Prognosis: good  Basis for estimate:chart review and clinical assessment  End of life decisions were reviewed with the patient/family. yes    [place]  Database checked on 2/20/2019 for Ashanti Domingo prior to prescribing controlled substances and no suspected fraudulent activity was found.        Total face to face time spent with patient > 30 minutes. Greater than 50% of the total time was spent counseling and/or coordinating care    The patient was seen for a discharge visit and 30 minutes were spent on the discharge process.      Priti Nuno, CNP

## 2021-09-21 NOTE — ASSESSMENT & PLAN NOTE
Attributed to COVID-19 virus infection  Hbg/Hct level stable (9/15/2021)  Renal functions WNL (9/15/2021)  No verbal at baseline with cognitive impairment

## 2021-09-21 NOTE — ASSESSMENT & PLAN NOTE
Evaluated and managed by Fabiana Foss on 9/15/2021  Continue Venelex ointment BID until resolved  Continue to provide prompt incontinence care   Turn and reposition as often as needed

## 2021-09-21 NOTE — ASSESSMENT & PLAN NOTE
Per mother, patient functionally still not back to baseline  Maximum assistance per PT progress note    Continue PT/OT as out-patient service  Fall precaution

## 2021-09-21 NOTE — ASSESSMENT & PLAN NOTE
Evaluated and managed by [place]  Last seen on 9/15/2021:  Location: Right great toe, Right 5th toe / Left 5th toe, Left great toe  = eschar and fluid filled blister  No sign of infection  = assessed to be likely COVID toes versus venous ulcer versus arterial ulcer  = Recommend local wound care: Betadine swab daily  = If status worsen: Arterial and Venous duplex  Recommend follow-up with [address] [place] [address] for further management upon discharge

## 2021-09-22 ENCOUNTER — NURSING HOME VISIT (OUTPATIENT)
Dept: WOUND CARE | Facility: HOSPITAL | Age: 56
End: 2021-09-22
Payer: MEDICARE

## 2021-09-22 DIAGNOSIS — L97.529 SKIN ULCERS OF BOTH FEET (HCC): ICD-10-CM

## 2021-09-22 DIAGNOSIS — L97.519 SKIN ULCERS OF BOTH FEET (HCC): ICD-10-CM

## 2021-09-22 DIAGNOSIS — R32 DERMATITIS ASSOCIATED WITH INCONTINENCE: Primary | ICD-10-CM

## 2021-09-22 DIAGNOSIS — L25.8 DERMATITIS ASSOCIATED WITH INCONTINENCE: Primary | ICD-10-CM

## 2021-09-22 PROCEDURE — 99308 SBSQ NF CARE LOW MDM 20: CPT | Performed by: NURSE PRACTITIONER

## 2021-09-23 ENCOUNTER — NURSING HOME VISIT (OUTPATIENT)
Dept: GERIATRICS | Facility: OTHER | Age: 56
End: 2021-09-23
Payer: MEDICARE

## 2021-09-23 VITALS
TEMPERATURE: 98.2 F | RESPIRATION RATE: 18 BRPM | HEART RATE: 87 BPM | DIASTOLIC BLOOD PRESSURE: 78 MMHG | OXYGEN SATURATION: 98 % | SYSTOLIC BLOOD PRESSURE: 130 MMHG

## 2021-09-23 DIAGNOSIS — N17.9 AKI (ACUTE KIDNEY INJURY) (HCC): ICD-10-CM

## 2021-09-23 DIAGNOSIS — I10 ESSENTIAL HYPERTENSION: ICD-10-CM

## 2021-09-23 DIAGNOSIS — R13.10 DYSPHAGIA, UNSPECIFIED TYPE: ICD-10-CM

## 2021-09-23 DIAGNOSIS — R53.81 PHYSICAL DECONDITIONING: ICD-10-CM

## 2021-09-23 DIAGNOSIS — G80.9 CEREBRAL PALSY, UNSPECIFIED TYPE (HCC): ICD-10-CM

## 2021-09-23 DIAGNOSIS — U07.1 COVID-19 VIRUS INFECTION: Primary | ICD-10-CM

## 2021-09-23 PROCEDURE — 99308 SBSQ NF CARE LOW MDM 20: CPT | Performed by: NURSE PRACTITIONER

## 2021-09-23 NOTE — ASSESSMENT & PLAN NOTE
· Blood pressure log reviewed and multiples episodes of systolic  blood pressure in the 90s or low 100s  · Nursing has reported episodes of hypotension with therapy  · Will decrease blood flow in to 240 mg p o  daily  · Avoid hypotension  · Encourage p o  hydration  · Periodically labwork  · Continue monitor closely and adjust medications as needed

## 2021-09-23 NOTE — ASSESSMENT & PLAN NOTE
Wound on the Right great toe, right 5th toe, left 5th toe, left great toe  - all eschar, stable  - no obvious sign of infection  - etiology : possible COVID toes, venous ulcer ( patient have edema on both feet), or arterial ulcer  - will provide local wound care using betadine  - if worsened, will order arterial and venous duplex, depending on the plan of care for the patients

## 2021-09-23 NOTE — PROGRESS NOTES
[address] [facility]       Patient ID: Eyad Keenan is a 63 y o  female Date of Birth 1965     Location of Service: [facility]    Performed wound round with: Nurse manager, DON      Chief Complaint   Patient presents with    Follow Up Wound Care Visit     Left great toe, left fifth toe, and buttocks, riight 5th toe, right great toe       Wound Instructions:  Orders Placed This Encounter   Procedures    Wound cleansing and dressings     Wound:  Buttocks  Discontinue previous wound order     Location : All the wounds on the toes  Cleanse with NSS  Apply betadine to wound bed   Insert lamb wool in between each toes  Daily and prn for soiling     Offload all wounds  Turn and reposition frequently, maximum of every two hours  Instruct / Assist with weight shifting every 15 - 20 minutes when in chair  Increase protein intake  Monitor for any sign of infection or worsening, inform PCP or patient's primary physician in your facility  Standing Status:   Future     Standing Expiration Date:   9/22/2022       Allergies  Bethanechol, Metoclopramide, and Sulfamethoxazole-trimethoprim      Assessment & Plan:  1  Dermatitis associated with incontinence  Assessment & Plan:  Buttocks  -healed    Orders:  -     Wound cleansing and dressings; Future    2  Skin ulcers of both feet (HCC)  Assessment & Plan:  Wound on the Right great toe, right 5th toe, left 5th toe, left great toe  - all eschar, stable  - no obvious sign of infection  - etiology : possible COVID toes, venous ulcer ( patient have edema on both feet), or arterial ulcer  - will provide local wound care using betadine  - if worsened, will order arterial and venous duplex, depending on the plan of care for the patients  Orders:  -     Wound cleansing and dressings;  Future           Subjective:   9/15/2021 This is  a 58-year-old female referred to our service for multiple wounds on the right and left toes and right buttock  Patient was recently admitted in acute care for COVID and was discharged to skilled nursing facility for short-term  As per report, although wounds were present on admission at skilled nursing facility  The patient had history of cerebral palsy and cognitive delay and was not able to provide information related to the wound  The patient's mother was present during the visit, and was not able to provide information also on where the  wound came from and when it started  As for medical record review, the patient have a poor appetite and the health is declining     9/22/2021 Follow up for wound on the right and left toes, and buttocks  As per mother, patient have a good appetite  No other significant issues related to the wound  Review of Systems   Reason unable to perform ROS: was not able to provide information due to cognitive delay  Objective: There were no vitals taken for this visit  Physical Exam  Constitutional:       Appearance: Normal appearance  HENT:      Head: Normocephalic and atraumatic  Nose: Nose normal    Eyes:      Conjunctiva/sclera: Conjunctivae normal    Pulmonary:      Effort: Pulmonary effort is normal    Abdominal:      Palpations: Abdomen is soft  Tenderness: There is no abdominal tenderness  There is no guarding  Musculoskeletal:         General: No tenderness  Cervical back: Normal range of motion  Right lower leg: Edema present  Left lower leg: Edema present  Comments: LROM  + 3 edema on bilateral feet  DP and PT non palpable due to edema   Skin:     General: Skin is warm  Findings: Lesion present        Comments: Location Right buttock wound bed -healed    Right great toe - 1 1  x 1 2 x 0 1 cm, 100% eschar  Right 5th toe - 1 1 x 1 2 x 0 1 cm, 100% eschar    Left 5th toe - 1 0 x 1 0 x 0 1 cm, 100% eschar  Left great toe ( anterior) - 2 0 x 2 0 x 0 1 cm, 100% eschar,      All wounds on the bilateral toes does not have obvious sign of infection     Neurological:      Mental Status: She is alert  Gait: Gait abnormal    Psychiatric:         Speech: She is noncommunicative  Judgment: Judgment is not impulsive  Procedures             Patient's care was coordinated with nursing facility staff  Recent vitals, labs and updated medications were reviewed on EMR or chart system of facility  Past Medical, surgical, social, medication and allergy history and patient's previous records were reviewed and updated as appropriate:     Patient Active Problem List   Diagnosis    Neuromuscular dysfunction of bladder    Recurrent UTI    Urinary incontinence    Acute vaginitis    COVID-19 virus infection    MARIA ELENA [facility]) [facility]    Hypernatremia    Dysphagia    Skin ulcers of both feet ([facility] )    Physical deconditioning    Dermatitis associated with incontinence    Acute metabolic encephalopathy    Chronic constipation    Cerebral palsy ([facility]    Essential hypertension    GERD (gastroesophageal reflux disease)    Hypothyroidism     Past Medical History:   Diagnosis Date    Arthritis     Blood pressure instability UNKNOWN    Bowel obstruction (HCC)     intestine    Bradycardia     Cerebral palsy (HCC)     Chronic edema     Closed fracture of coccyx ([facility] )     DVT (deep venous thrombosis) ([place] [id number] )     US Doppler done 10/2015 and 3/2016    GERD (gastroesophageal reflux disease)     History of Doppler ultrasound     of UE and LE; 10/2015 and 3/2016 DVT    History of echocardiogram 03/2016    EF 55%  Trace mitral and tricuspid regurgitation  PA systolic pressure 33  Echo Doppler 2/2017- EF 60%   History of EKG 10/20/2016    Normal sinus rhythm with sinus arrhythmia  Abnormal ECG     History of Holter monitoring 11/2017    14 DAY MONITOR Baseline rhythm was of sinus origin with rare APCs and VPCs and a few baseline motion artifact  NO OTHER ARRHYTHMIAS   No symptoms was reported in the attached diary   History of Holter monitoring 10/2016    Sinus rhythm  There were a few episodes of asymptomatic sinus arrhythmia  There was sinus bradycardia at rate as low as 47 bpm  There were rare single PACs and PVCs   Hypertension     Hyperthyroidism     Intellectual disability     Kidney stone     Lymphedema     Pneumonia     Stroke ([facility] )     UNCERTAIN OF TYPE/NON VERBAL    Tachycardia     Thyroid disease     Urinary incontinence     Urinary tract infection      Past Surgical History:   Procedure Laterality Date    ABDOMINAL HYSTERECTOMY      COLONOSCOPY      EYE SURGERY      HYSTERECTOMY      HYSTERECTOMY      IR DRAINAGE TUBE PLACEMENT  2020    SMALL INTESTINE SURGERY       Social History     Socioeconomic History    Marital status: Single     Spouse name: None    Number of children: 0    Years of education: None    Highest education level: None   Occupational History    Occupation: Disabled   Tobacco Use    Smoking status: Never Smoker    Smokeless tobacco: Never Used   Vaping Use    Vaping Use: Never used   Substance and Sexual Activity    Alcohol use: No    Drug use: No    Sexual activity: Never   Other Topics Concern    None   Social History Narrative    · Most recent tobacco use screenin2020      · Do you currently or have you served in the [employer]:   No      · Were you activated, into active duty, as a member of the [employer] or as a Reservist:   No       · Live alone or with others:   with others      · Number of children:   0      · Siblings:   yes        · Caffeine intake:   Occasional      · Diet:   Specific      · Exercise level:    Moderate      · Family history of heart disease:   No      · Overweight:   No      · Obese:   No      · Advance directive:   No      · Blood Transfusion:   No       · High cholesterol:   No      · High blood pressure:   No      · Diabetes:   No      · General stress level:   Low      · Guns present in home:   No      · Seat belts used routinely:   Yes      · Seat belt/car seat used routinely:   Yes      · Do you have regular medical check ups:   Yes      Social Determinants of Health     Financial Resource Strain:     Difficulty of Paying Living Expenses:    Food Insecurity:     Worried About Running Out of Food in the Last Year:     [address] in the Last Year:    Transportation Needs:     Lack of Transportation (Medical):      Lack of Transportation (Non-Medical):    Physical Activity:     Days of Exercise per Week:     Minutes of Exercise per Session:    Stress:     Feeling of Stress :    Social Connections:     Frequency of Communication with Friends and Family:     Frequency of Social Gatherings with Friends and Family:     Attends Gnosticism Services:     Active Member of Clubs or Organizations:     Attends Club or Organization Meetings:     Marital Status:    Intimate Partner Violence:     Fear of Current or Ex-Partner:     Emotionally Abused:     Physically Abused:     Sexually Abused:         Current Outpatient Medications:     acetaminophen (TYLENOL) 325 mg tablet, Take 650 mg by mouth every 6 (six) hours as needed, Disp: , Rfl:     aspirin 81 mg chewable tablet, Chew 1 tablet (81 mg total) daily for 7 days, Disp: 7 tablet, Rfl: 0    Balsam [place] Oil (Venelex) OINT, Apply 1 application topically 2 (two) times a day for 7 days  - apply to buttocks in AM + PM until resolved, Disp: 30 g, Rfl: 1    bisacodyl (DULCOLAX) 10 mg suppository, Insert 10 mg into the rectum daily as needed, Disp: , Rfl:     Garlic 0956 MG CAPS, Take 1 capsule by mouth daily, Disp: , Rfl:     lansoprazole (PREVACID) 30 mg capsule, TAKE 1 CAPSULE BY MOUTH EVERY DAY, Disp: 90 capsule, Rfl: 3    levothyroxine 150 mcg tablet, Take 1 tablet (150 mcg total) by mouth daily, Disp: 90 tablet, Rfl: 1    Linzess 145 MCG CAPS, TAKE 1 CAPSULE BY MOUTH EVERY DAY, Disp: 90 capsule, Rfl: 1    magnesium hydroxide (MILK OF MAGNESIA) 400 mg/5 mL oral suspension, Take 30 mL by mouth daily as needed, Disp: , Rfl:     meloxicam (MOBIC) 7 5 mg tablet, Take 1 tablet (7 5 mg total) by mouth daily for 7 days, Disp: 7 tablet, Rfl: 0    methocarbamol (ROBAXIN) 500 mg tablet, Take 500 mg by mouth every 6 (six) hours as needed, Disp: , Rfl:     senna (SENOKOT) 8 6 MG tablet, Take 1 tablet (8 6 mg total) by mouth daily for 7 days, Disp: 7 tablet, Rfl: 0    Sodium Phosphates (FLEET ENEMA RE), Insert 1 Bottle into the rectum daily as needed, Disp: , Rfl:     valsartan (DIOVAN) 320 MG tablet, TAKE 1 TABLET BY MOUTH EVERY DAY, Disp: 90 tablet, Rfl: 1  Family History   Problem Relation Age of Onset    Cancer Father     Colon cancer Father     Asthma Mother     Stroke Mother     WESTLEY disease Mother     Arthritis Mother     Diabetes type II Mother     Mitral valve prolapse Mother     Irritable bowel syndrome Mother     Heart disease Mother               Coordination of Care: Nurse manager aware of the treatment plan    Patient / Staff education :  Staff was given education on sign of infection and pressure ulcer prevention  Staff verbalized understanding     Follow up :  Return in about 1 week (around 9/29/2021)  Voice-recognition software may have been used in the preparation of this document  Occasional wrong word or "sound-alike" substitutions may have occurred due to the inherent limitations of voice recognition software  Interpretation should be guided by context        JONATHAN Díaz

## 2021-09-23 NOTE — ASSESSMENT & PLAN NOTE
· Remains clinically stable and asymptomatic  · No issues with hypoxia on room air  · COVID precautions discontinued  · Continued PT/OT

## 2021-09-23 NOTE — ASSESSMENT & PLAN NOTE
· Continue with modified diet, currently on pureed diet with nectar thick liquids  · Aspiration precautions  · Assist patient with all meals  · ST as needed

## 2021-09-23 NOTE — PROGRESS NOTES
[address] [facility]  [address]  [phone]  Triangulate  Code 31      NAME: Ula Bamberger  AGE: 63 y o   SEX: female    DATE OF ENCOUNTER: 9/23/2021    Assessment and Plan     Problem List Items Addressed This Visit        Digestive    Dysphagia     · Continue with modified diet, currently on pureed diet with nectar thick liquids  · Aspiration precautions  · Assist patient with all meals  · ST as needed            Cardiovascular and Mediastinum    Essential hypertension     · Blood pressure log reviewed and multiples episodes of systolic  blood pressure in the 90s or low 100s  · Nursing has reported episodes of hypotension with therapy  · Will decrease blood flow in to 240 mg p o  daily  · Avoid hypotension  · Encourage p o  hydration  · Periodically labwork  · Continue monitor closely and adjust medications as needed            Nervous and Auditory    C[facility]bral palsy ([facility] )     · Lives At home with mother as primary caregiver  · Has had functional decline since COVID-19 infection  · Continue to see maximal assistance  · Continue with meloxicam 7 5 mg daily and Robaxin p r n  for pain and spasticity  · Continue PT/OT            Genitourinary    MARIA ELENA (acute kidney injury) ([facility] )     · During previous hospitalization likely due to dehydration  · Creatinine today 0 48  · Avoid nephrotoxic medication  · Renal dose medications as needed  · Encourage p o  hydration            Other    COVID-19 virus infection - Primary     · Remains clinically stable and asymptomatic  · No issues with hypoxia on room air  · COVID precautions discontinued  · Continued PT/OT         Physical deconditioning     · Still remains with functional decline, mother was discharged home but she remains here for further therapy  · Still requires maximal assistance per PT  · Continued PT/OT  · Frequent turning repositioning while in bed               No orders of the defined types were placed in this encounter  Chief Complaint     No chief complaint on file  History of Present Illness     Caryl Jason is a 62-year-old female at [facility] for short-term rehab after recent hospitalization for COVID-19 infection  She is being seen today for follow-up on her chronic medical conditions  Her comorbidities include hypertension, cerebral palsy, hypothyroidism, and gerd  Upon examination patient was lying in bed, resting  She appeared comfortable, but was slightly anxious throughout exam   ROS is limited due to being nonverbal   Per nursing appetite has been her baseline  She sleeps well at night  Vital signs have been stable, although she has had some issues with soft blood pressures  Per nursing no other issues with cough, fever, diarrhea or constipation  Per nursing no other acute concerns or issues at this time  The following portions of the patient's history were reviewed and updated as appropriate: allergies, current medications, past family history, past medical history, past social history, past surgical history and problem list     Review of Systems     Review of Systems   Unable to perform ROS: Patient nonverbal       Active Problem List     Patient Active Problem List   Diagnosis    Neuromuscular dysfunction of bladder    Recurrent UTI    Urinary incontinence    Acute vaginitis    COVID-19 virus infection    MARIA ELENA (acute kidney injury) ([address]    Hypernatremia    Dysphagia    Skin ulcers of both feet ([address]    Physical deconditioning    Dermatitis associated with incontinence    Acute metabolic encephalopathy    Chronic constipation    Cerebral palsy ([address]    Essential hypertension    GERD (gastroesophageal reflux disease)    Hypothyroidism       Objective     /78   Pulse 87   Temp 98 2 °F (36 8 °C)   Resp 18   SpO2 98%     Physical Exam  Vitals reviewed  Constitutional:       General: She is not in acute distress       Appearance: She is not ill-appearing  Comments: Chronically ill appearing   HENT:      Head: Normocephalic and atraumatic  Cardiovascular:      Rate and Rhythm: Normal rate and regular rhythm  Pulses: Normal pulses  Heart sounds: Normal heart sounds  No murmur heard  Pulmonary:      Effort: Pulmonary effort is normal  No respiratory distress  Breath sounds: No wheezing  Comments: diminished b/l  Abdominal:      General: Bowel sounds are normal  There is no distension  Palpations: Abdomen is soft  Tenderness: There is no abdominal tenderness  Musculoskeletal:         General: Deformity present  Right lower leg: Edema present  Left lower leg: Edema present  Skin:     General: Skin is warm and dry  Coloration: Skin is not jaundiced  Findings: Wound present  No bruising  Neurological:      General: No focal deficit present  Mental Status: She is alert  Mental status is at baseline  Motor: Weakness present  Gait: Gait abnormal       Comments: Nonverbal, hx of CP   Psychiatric:         Mood and Affect: Mood is anxious  Behavior: Behavior normal          Pertinent Laboratory/Diagnostic Studies:  Labs reviewed in facility paper chart        Mukund Holiday JONATHAN  9/23/2021 1:18 PM

## 2021-09-23 NOTE — ASSESSMENT & PLAN NOTE
· Still remains with functional decline, mother was discharged home but she remains here for further therapy  · Still requires maximal assistance per PT  · Continued PT/OT  · Frequent turning repositioning while in bed

## 2021-09-23 NOTE — ASSESSMENT & PLAN NOTE
· During previous hospitalization likely due to dehydration  · Creatinine today 0 48  · Avoid nephrotoxic medication  · Renal dose medications as needed  · Encourage p o  hydration

## 2021-09-23 NOTE — PATIENT INSTRUCTIONS
Orders Placed This Encounter   Procedures    Wound cleansing and dressings     Wound:  Buttocks  Discontinue previous wound order     Location : All the wounds on the toes  Cleanse with NSS  Apply betadine to wound bed   Insert lamb wool in between each toes  Daily and prn for soiling     Offload all wounds  Turn and reposition frequently, maximum of every two hours  Instruct / Assist with weight shifting every 15 - 20 minutes when in chair  Increase protein intake  Monitor for any sign of infection or worsening, inform PCP or patient's primary physician in your facility       Standing Status:   Future     Standing Expiration Date:   9/22/2022

## 2021-09-23 NOTE — ASSESSMENT & PLAN NOTE
· Lives At home with mother as primary caregiver  · Has had functional decline since COVID-19 infection  · Continue to see maximal assistance  · Continue with meloxicam 7 5 mg daily and Robaxin p r n  for pain and spasticity  · Continue PT/OT

## 2021-09-29 ENCOUNTER — NURSING HOME VISIT (OUTPATIENT)
Dept: WOUND CARE | Facility: HOSPITAL | Age: 56
End: 2021-09-29
Payer: MEDICARE

## 2021-09-29 DIAGNOSIS — L97.519 SKIN ULCERS OF BOTH FEET (HCC): Primary | ICD-10-CM

## 2021-09-29 DIAGNOSIS — L97.529 SKIN ULCERS OF BOTH FEET (HCC): Primary | ICD-10-CM

## 2021-09-29 DIAGNOSIS — R26.2 AMBULATORY DYSFUNCTION: ICD-10-CM

## 2021-09-29 PROCEDURE — 99308 SBSQ NF CARE LOW MDM 20: CPT | Performed by: NURSE PRACTITIONER

## 2021-09-29 NOTE — ASSESSMENT & PLAN NOTE
Wound on the Right great toe, right 5th toe, left 5th toe, left great toe  - all eschar, stable, no significant changes  - no obvious sign of infection  - etiology : possible COVID toes, venous ulcer ( patient have edema on both feet), or arterial ulcer  - will provide local wound care using betadine

## 2021-09-29 NOTE — PROGRESS NOTES
[address] MANAGEMENT   AND [facility]       Patient ID: Marin Lucia is a 63 y o  female Date of Birth 1965     [facility]    Performed wound round with: Nurse manager, [address]      Chief Complaint   Patient presents with    Follow Up Wound Care Visit     Left and right great toe, left and right 5th toe       Wound Instructions:  Orders Placed This Encounter   Procedures    Wound cleansing and dressings     Location : All the wounds on the toes  Cleanse with NSS  Apply betadine to wound bed   Insert lamb wool in between each toes  Daily and prn for soiling     Offload all wounds  Turn and reposition frequently, maximum of every two hours  Instruct / Assist with weight shifting every 15 - 20 minutes when in chair  Increase protein intake  Monitor for any sign of infection or worsening, inform PCP or patient's primary physician in your facility  Standing Status:   Future     Standing Expiration Date:   9/29/2022       Allergies  Bethanechol, Metoclopramide, and Sulfamethoxazole-trimethoprim      Assessment & Plan:  1  Skin ulcers of both feet (HCC)  Assessment & Plan:  Wound on the Right great toe, right 5th toe, left 5th toe, left great toe  - all eschar, stable, no significant changes  - no obvious sign of infection  - etiology : possible COVID toes, venous ulcer ( patient have edema on both feet), or arterial ulcer  - will provide local wound care using betadine         Orders:  -     Wound cleansing and dressings; Future    2  Ambulatory dysfunction  Assessment & Plan:  - on 24/7 restorative care             Subjective:   9/15/2021 This is  a 24-year-old female referred to our service for multiple wounds on the right and left toes and right buttock  Patient was recently admitted in acute care for COVID and was discharged to skilled nursing facility for short-term    As per report, although wounds were present on admission at skilled nursing facility  The patient had history of cerebral palsy and cognitive delay and was not able to provide information related to the wound  The patient's mother was present during the visit, and was not able to provide information also on where the  wound came from and when it started  As for medical record review, the patient have a poor appetite and the health is declining     9/22/2021 Follow up for wound on the right and left toes, and buttocks  As per mother, patient have a good appetite  No other significant issues related to the wound  9/29/2021 Follow up for wound on the right and left toes  Received patient in bed, seems comfortable  No other significant issues related to the wound  Review of Systems   Reason unable to perform ROS: was not able to provide information due to cognitive delay  Objective: There were no vitals taken for this visit  Physical Exam  Constitutional:       Appearance: Normal appearance  HENT:      Head: Normocephalic and atraumatic  Nose: Nose normal    Eyes:      Conjunctiva/sclera: Conjunctivae normal    Pulmonary:      Effort: Pulmonary effort is normal    Abdominal:      Palpations: Abdomen is soft  Tenderness: There is no abdominal tenderness  There is no guarding  Musculoskeletal:         General: No tenderness  Cervical back: Normal range of motion  Right lower leg: Edema present  Left lower leg: Edema present  Comments: LROM  + 3 edema on bilateral feet  DP and PT non palpable due to edema   Skin:     General: Skin is warm  Findings: Lesion present  Comments:   Right great toe - 1 5  x 1 2 x 0 1 cm, 100% eschar  Right 5th toe - 2 0 x 3 0 x 0 1 cm, 100% eschar    Left 5th toe - 1 1 x 1 0 x 0 1 cm, 100% eschar  Left great toe ( anterior) - 2 0 x 2 0 x 0 1 cm, 100% eschar,      All wounds on the bilateral toes does not have obvious sign of infection     Neurological:      Mental Status: She is alert        Gait: Gait abnormal    Psychiatric:         Speech: She is noncommunicative  Judgment: Judgment is not impulsive  Procedures             Patient's care was coordinated with nursing facility staff  Recent vitals, labs and updated medications were reviewed on EMR or chart system of facility  Past Medical, surgical, social, medication and allergy history and patient's previous records were reviewed and updated as appropriate:     Patient Active Problem List   Diagnosis    Neuromuscular dysfunction of bladder    Recurrent UTI    Urinary incontinence    Acute vaginitis    COVID-19 virus infection    MARIA ELENA (acute kidney injury) ([facility] )    Hypernatremia    Dysphagia    [facility]    Physical deconditioning    Dermatitis associated with incontinence    Acute metabolic encephalopathy    Chronic constipation    Cerebral palsy ([facility]    Essential hypertension    GERD (gastroesophageal reflux disease)    Hypothyroidism    Ambulatory dysfunction     Past Medical History:   Diagnosis Date    Arthritis     Blood pressure instability UNKNOWN    Bowel obstruction (HCC)     intestine    Bradycardia     Cerebral palsy (HCC)     Chronic edema     Closed fracture of coccyx ([facility] )     DVT (deep venous thrombosis) ([place] [address] )     US Doppler done 10/2015 and 3/2016    GERD (gastroesophageal reflux disease)     History of Doppler ultrasound     of UE and LE; 10/2015 and 3/2016 DVT    History of echocardiogram 03/2016    EF 55%  Trace mitral and tricuspid regurgitation  PA systolic pressure 33  Echo Doppler 2/2017- EF 60%   History of EKG 10/20/2016    Normal sinus rhythm with sinus arrhythmia  Abnormal ECG     History of Holter monitoring 11/2017    14 DAY MONITOR Baseline rhythm was of sinus origin with rare APCs and VPCs and a few baseline motion artifact  NO OTHER ARRHYTHMIAS  No symptoms was reported in the attached diary   History of Holter monitoring 10/2016    Sinus rhythm  There were a few episodes of asymptomatic sinus arrhythmia  There was sinus bradycardia at rate as low as 47 bpm  There were rare single PACs and PVCs   Hypertension     Hyperthyroidism     Intellectual disability     Kidney stone     Lymphedema     Pneumonia     Stroke ([facility] )     UNCERTAIN OF TYPE/NON VERBAL    Tachycardia     Thyroid disease     Urinary incontinence     Urinary tract infection      Past Surgical History:   Procedure Laterality Date    ABDOMINAL HYSTERECTOMY      COLONOSCOPY      EYE SURGERY      HYSTERECTOMY      HYSTERECTOMY      IR DRAINAGE TUBE PLACEMENT  2020    SMALL INTESTINE SURGERY       Social History     Socioeconomic History    Marital status: Single     Spouse name: None    Number of children: 0    Years of education: None    Highest education level: None   Occupational History    Occupation: Disabled   Tobacco Use    Smoking status: Never Smoker    Smokeless tobacco: Never Used   Vaping Use    Vaping Use: Never used   Substance and Sexual Activity    Alcohol use: No    Drug use: No    Sexual activity: Never   Other Topics Concern    None   Social History Narrative    · Most recent tobacco use screenin2020      · Do you currently or have you served in the [employer]:   No      · Were you activated, into active duty, as a member of the [employer] or as a Reservist:   No       · Live alone or with others:   with others      · Number of children:   0      · Siblings:   yes        · Caffeine intake:   Occasional      · Diet:   Specific      · Exercise level:    Moderate      · Family history of heart disease:   No      · Overweight:   No      · Obese:   No      · Advance directive:   No      · Blood Transfusion:   No       · High cholesterol:   No      · High blood pressure:   No      · Diabetes:   No      · General stress level:   Low      · Guns present in home:   No      · Seat belts used routinely:   Yes      · Seat belt/car seat used routinely:   Yes      · Do you have regular medical check ups:   Yes      Social Determinants of Health     Financial Resource Strain:     Difficulty of Paying Living Expenses:    Food Insecurity:     Worried About Running Out of Food in the Last Year:     [address] in the Last Year:    Transportation Needs:     Lack of Transportation (Medical):      Lack of Transportation (Non-Medical):    Physical Activity:     Days of Exercise per Week:     Minutes of Exercise per Session:    Stress:     Feeling of Stress :    Social Connections:     Frequency of Communication with Friends and Family:     Frequency of Social Gatherings with Friends and Family:     Attends [employer]od[employer] Services:     Active Member of Clubs or Organizations:     Attends Club or Organization Meetings:     Marital Status:    Intimate Partner Violence:     Fear of Current or Ex-Partner:     Emotionally Abused:     Physically Abused:     Sexually Abused:         Current Outpatient Medications:     acetaminophen (TYLENOL) 325 mg tablet, Take 650 mg by mouth every 6 (six) hours as needed, Disp: , Rfl:     aspirin 81 mg chewable tablet, Chew 1 tablet (81 mg total) daily for 7 days, Disp: 7 tablet, Rfl: 0    Balsam [place] Oil (Venelex) OINT, Apply 1 application topically 2 (two) times a day for 7 days  - apply to buttocks in AM + PM until resolved, Disp: 30 g, Rfl: 1    bisacodyl (DULCOLAX) 10 mg suppository, Insert 10 mg into the rectum daily as needed, Disp: , Rfl:     Garlic 2586 MG CAPS, Take 1 capsule by mouth daily, Disp: , Rfl:     lansoprazole (PREVACID) 30 mg capsule, TAKE 1 CAPSULE BY MOUTH EVERY DAY, Disp: 90 capsule, Rfl: 3    levothyroxine 150 mcg tablet, Take 1 tablet (150 mcg total) by mouth daily, Disp: 90 tablet, Rfl: 1    Linzess 145 MCG CAPS, TAKE 1 CAPSULE BY MOUTH EVERY DAY, Disp: 90 capsule, Rfl: 1    magnesium hydroxide (MILK OF MAGNESIA) 400 mg/5 mL oral suspension, Take 30 mL by mouth daily as needed, Disp: , Rfl:     meloxicam (MOBIC) 7 5 mg tablet, Take 1 tablet (7 5 mg total) by mouth daily for 7 days, Disp: 7 tablet, Rfl: 0    methocarbamol (ROBAXIN) 500 mg tablet, Take 500 mg by mouth every 6 (six) hours as needed, Disp: , Rfl:     senna (SENOKOT) 8 6 MG tablet, Take 1 tablet (8 6 mg total) by mouth daily for 7 days, Disp: 7 tablet, Rfl: 0    Sodium Phosphates (FLEET ENEMA RE), Insert 1 Bottle into the rectum daily as needed, Disp: , Rfl:     valsartan (DIOVAN) 320 MG tablet, TAKE 1 TABLET BY MOUTH EVERY DAY, Disp: 90 tablet, Rfl: 1  Family History   Problem Relation Age of Onset    Cancer Father     Colon cancer Father     Asthma Mother     Stroke Mother     WESTLEY disease Mother     Arthritis Mother     Diabetes type II Mother     Mitral valve prolapse Mother     Irritable bowel syndrome Mother     Heart disease Mother               Coordination of Care: Nurse manager aware of the treatment plan    Patient / Staff education :  Staff was given education on sign of infection and pressure ulcer prevention  Staff verbalized understanding     Follow up :  Return in about 1 year (around 9/29/2022)  Voice-recognition software may have been used in the preparation of this document  Occasional wrong word or "sound-alike" substitutions may have occurred due to the inherent limitations of voice recognition software  Interpretation should be guided by context        JONATHAN Reardon

## 2021-09-30 ENCOUNTER — NURSING HOME VISIT (OUTPATIENT)
Dept: GERIATRICS | Facility: OTHER | Age: 56
End: 2021-09-30
Payer: MEDICARE

## 2021-09-30 DIAGNOSIS — U07.1 COVID-19 VIRUS INFECTION: Primary | ICD-10-CM

## 2021-09-30 PROCEDURE — 99308 SBSQ NF CARE LOW MDM 20: CPT | Performed by: INTERNAL MEDICINE

## 2021-09-30 NOTE — PROGRESS NOTES
[address]  [address]   [address] [facility] [address] [place], [place], [address]  48     Progress Note                                Code [facility] 31  Patient Location     [facility]    Reason for visit     Follow-up COVID-19, GERD, intellectual disability, essential hypertension, hypothyroidism, ambulatory dysfunction    Patients care was coordinated with nursing facility staff  Recent vitals, labs and updated medications [facility]  Problem List Items Addressed This Visit        Other    COVID-19 virus infection - Primary     Patient has recovered well from COVID-19  Respiratory status remains stable with SaO2 of 99% on room air  Patient has completed quarantine             Recurrent UTI:  Patient recently completed antibiotic course     Hypernatremia  Resolved  Sodium level was stable 136 on 09/23/2021     Hypothyroidism:  Continue levothyroxine     Chronic constipation:  Stable on Linzess     HTN:  Blood pressure stable on valsartan     Cognitive development delay:  Continue supportive treatment    Bilateral feet ulcers:  Patient is being followed by wound care team   Recent follow-up notes were reviewed  Her wounds appear to be healing well  No signs of infection     Ambulatory dysfunction  Continue PT OT        Transaminitis:   Attributed to COVID-19 infection  Recent right upper quadrant ultrasound was unrevealing       HPI       Patient is being seen for a follow-up visit today  She is doing well  Patient is unable to provide any history due to intellectual and developmental delay  Overall she appears to have improved  Patient is nonverbal   Eating 100% of meals per recent nursing staff notes  Patient needs assistance with all ADLs  Sacral wound has resolved  Patient has bilateral toe wounds being followed by wound care team   Recent notes were reviewed    No obvious signs of infection    Review of Systems   Unable to perform ROS: Other (Patient is unable to provide any history due to developmental delay, intellectual disability)   Constitutional: Negative for chills and fever  HENT: Negative for rhinorrhea  Respiratory: Negative for shortness of breath and stridor  Cardiovascular: Negative for leg swelling  Genitourinary: Negative for hematuria  Musculoskeletal: Positive for gait problem  Neurological: Positive for weakness (Generalized)  Negative for seizures  Psychiatric/Behavioral: Positive for confusion  Past Medical History:   Diagnosis Date    Arthritis     Blood pressure instability UNKNOWN    Bowel obstruction (HCC)     intestine    Bradycardia     Cerebral palsy (HCC)     Chronic edema     Closed fracture of coccyx (HCC)     DVT (deep venous thrombosis) ([facility] [address] )     US Doppler done 10/2015 and 3/2016    GERD (gastroesophageal reflux disease)     History of Doppler ultrasound     of UE and LE; 10/2015 and 3/2016 DVT    History of echocardiogram 03/2016    EF 55%  Trace mitral and tricuspid regurgitation  PA systolic pressure 33  Echo Doppler 2/2017- EF 60%   History of EKG 10/20/2016    Normal sinus rhythm with sinus arrhythmia  Abnormal ECG     History of Holter monitoring 11/2017    14 DAY MONITOR Baseline rhythm was of sinus origin with rare APCs and VPCs and a few baseline motion artifact  NO OTHER ARRHYTHMIAS  No symptoms was reported in the attached diary   History of Holter monitoring 10/2016    Sinus rhythm  There were a few episodes of asymptomatic sinus arrhythmia  There was sinus bradycardia at rate as low as 47 bpm  There were rare single PACs and PVCs       Hypertension     Hyperthyroidism     Intellectual disability     Kidney stone     Lymphedema     Pneumonia     Stroke ([facility] )     UNCERTAIN OF TYPE/NON VERBAL    Tachycardia     Thyroid disease     Urinary incontinence     Urinary tract infection        Past Surgical History:   Procedure Laterality Date    ABDOMINAL HYSTERECTOMY      COLONOSCOPY      EYE SURGERY      HYSTERECTOMY      HYSTERECTOMY      IR DRAINAGE TUBE PLACEMENT  6/12/2020    SMALL INTESTINE SURGERY         Social History     Tobacco Use   Smoking Status Never Smoker   Smokeless Tobacco Never Used       Family History   Problem Relation Age of Onset    Cancer Father     Colon cancer Father     Asthma Mother     Stroke Mother     WESTLEY disease Mother     Arthritis Mother     Diabetes type II Mother     Mitral valve prolapse Mother     Irritable bowel syndrome Mother     Heart disease Mother         Allergies   Allergen Reactions    Bethanechol      Reaction Date: 10Dec2007; SEIZURES      Metoclopramide      Reaction Date: 10Dec2007; SEIZURES      Sulfamethoxazole-Trimethoprim      Reaction Date: 10Dec2007; SEIZURES           Current Outpatient Medications:     acetaminophen (TYLENOL) 325 mg tablet, Take 650 mg by mouth every 6 (six) hours as needed, Disp: , Rfl:     aspirin 81 mg chewable tablet, Chew 1 tablet (81 mg total) daily for 7 days, Disp: 7 tablet, Rfl: 0    Balsam [place] Oil (Venelex) OINT, Apply 1 application topically 2 (two) times a day for 7 days  - apply to buttocks in AM + PM until resolved, Disp: 30 g, Rfl: 1    bisacodyl (DULCOLAX) 10 mg suppository, Insert 10 mg into the rectum daily as needed, Disp: , Rfl:     Garlic 9320 MG CAPS, Take 1 capsule by mouth daily, Disp: , Rfl:     lansoprazole (PREVACID) 30 mg capsule, TAKE 1 CAPSULE BY MOUTH EVERY DAY, Disp: 90 capsule, Rfl: 3    levothyroxine 150 mcg tablet, Take 1 tablet (150 mcg total) by mouth daily, Disp: 90 tablet, Rfl: 1    Linzess 145 MCG CAPS, TAKE 1 CAPSULE BY MOUTH EVERY DAY, Disp: 90 capsule, Rfl: 1    magnesium hydroxide (MILK OF MAGNESIA) 400 mg/5 mL oral suspension, Take 30 mL by mouth daily as needed, Disp: , Rfl:     meloxicam (MOBIC) 7 5 mg tablet, Take 1 tablet (7 5 mg total) by mouth daily for 7 days, Disp: 7 tablet, Rfl: 0    methocarbamol (ROBAXIN) 500 mg tablet, Take 500 mg by mouth every 6 (six) hours as needed, Disp: , Rfl:     senna (SENOKOT) 8 6 MG tablet, Take 1 tablet (8 6 mg total) by mouth daily for 7 days, Disp: 7 tablet, Rfl: 0    Sodium Phosphates (FLEET ENEMA RE), Insert 1 Bottle into the rectum daily as needed, Disp: , Rfl:     valsartan (DIOVAN) 320 MG tablet, TAKE 1 TABLET BY MOUTH EVERY DAY, Disp: 90 tablet, Rfl: 1    Updated list was reviewed in [facility] system of facility  Vitals:    09/29/21 1131   BP: 101/79   Pulse: 100   Resp: 18   Temp: 98 °F (36 7 °C)   SpO2: 95%       Physical Exam  HENT:      Head: Normocephalic and atraumatic  Nose: No rhinorrhea  Eyes:      General: No scleral icterus  Cardiovascular:      Rate and Rhythm: Normal rate and regular rhythm  Pulmonary:      Breath sounds: No wheezing  Abdominal:      Palpations: Abdomen is soft  Tenderness: There is no abdominal tenderness  There is no guarding  Musculoskeletal:      Cervical back: Neck supple  Right lower leg: Edema present  Left lower leg: Edema present  Skin:     Coloration: Skin is not jaundiced  Comments: Bilateral feet are covered with dressing   Neurological:      Mental Status: She is disoriented  Motor: Weakness (Generalized) present  Comments: Increased rigidity involving right upper extremity  Contractures of left hand noted   Psychiatric:         Mood and Affect: Mood normal        Diagnostic Data:    Recent labs were reviewed    Labs done on 09/23/2021 revealed hemoglobin of 9 6, WBC count 5 6, platelet count 619   BUN 24, creatinine 0 48, sodium 136, potassium 4    Additional Notes:   Continue current treatment   Continue PT OT  Repeat BMP prior to discharge    This note was electronically signed by Dr Army Oppenheim

## 2021-10-04 ENCOUNTER — NURSING HOME VISIT (OUTPATIENT)
Dept: GERIATRICS | Facility: OTHER | Age: 56
End: 2021-10-04
Payer: MEDICARE

## 2021-10-04 DIAGNOSIS — L97.519 SKIN ULCERS OF BOTH FEET (HCC): ICD-10-CM

## 2021-10-04 DIAGNOSIS — G93.41 ACUTE METABOLIC ENCEPHALOPATHY: ICD-10-CM

## 2021-10-04 DIAGNOSIS — K59.09 CHRONIC CONSTIPATION: ICD-10-CM

## 2021-10-04 DIAGNOSIS — L25.8 DERMATITIS ASSOCIATED WITH INCONTINENCE: ICD-10-CM

## 2021-10-04 DIAGNOSIS — K21.9 GASTROESOPHAGEAL REFLUX DISEASE, UNSPECIFIED WHETHER ESOPHAGITIS PRESENT: ICD-10-CM

## 2021-10-04 DIAGNOSIS — I10 ESSENTIAL HYPERTENSION: ICD-10-CM

## 2021-10-04 DIAGNOSIS — U07.1 COVID-19 VIRUS INFECTION: Primary | ICD-10-CM

## 2021-10-04 DIAGNOSIS — L97.529 SKIN ULCERS OF BOTH FEET (HCC): ICD-10-CM

## 2021-10-04 DIAGNOSIS — R32 DERMATITIS ASSOCIATED WITH INCONTINENCE: ICD-10-CM

## 2021-10-04 DIAGNOSIS — G80.9 CEREBRAL PALSY, UNSPECIFIED TYPE (HCC): ICD-10-CM

## 2021-10-04 DIAGNOSIS — R53.81 PHYSICAL DECONDITIONING: ICD-10-CM

## 2021-10-04 DIAGNOSIS — E03.9 HYPOTHYROIDISM, UNSPECIFIED TYPE: ICD-10-CM

## 2021-10-04 PROCEDURE — 99309 SBSQ NF CARE MODERATE MDM 30: CPT | Performed by: NURSE PRACTITIONER

## 2021-10-06 ENCOUNTER — NURSING HOME VISIT (OUTPATIENT)
Dept: WOUND CARE | Facility: HOSPITAL | Age: 56
End: 2021-10-06
Payer: MEDICARE

## 2021-10-06 ENCOUNTER — NURSING HOME VISIT (OUTPATIENT)
Dept: GERIATRICS | Facility: OTHER | Age: 56
End: 2021-10-06
Payer: MEDICARE

## 2021-10-06 VITALS
TEMPERATURE: 97.6 F | SYSTOLIC BLOOD PRESSURE: 127 MMHG | OXYGEN SATURATION: 96 % | RESPIRATION RATE: 18 BRPM | DIASTOLIC BLOOD PRESSURE: 73 MMHG | HEART RATE: 91 BPM

## 2021-10-06 DIAGNOSIS — K59.09 CHRONIC CONSTIPATION: ICD-10-CM

## 2021-10-06 DIAGNOSIS — L97.529 SKIN ULCERS OF BOTH FEET (HCC): Primary | ICD-10-CM

## 2021-10-06 DIAGNOSIS — R26.2 AMBULATORY DYSFUNCTION: ICD-10-CM

## 2021-10-06 DIAGNOSIS — L97.519 SKIN ULCERS OF BOTH FEET (HCC): Primary | ICD-10-CM

## 2021-10-06 DIAGNOSIS — G80.9 CEREBRAL PALSY, UNSPECIFIED TYPE (HCC): ICD-10-CM

## 2021-10-06 DIAGNOSIS — E87.0 HYPERNATREMIA: ICD-10-CM

## 2021-10-06 DIAGNOSIS — I10 ESSENTIAL HYPERTENSION: ICD-10-CM

## 2021-10-06 PROCEDURE — 99309 SBSQ NF CARE MODERATE MDM 30: CPT | Performed by: NURSE PRACTITIONER

## 2021-10-06 PROCEDURE — 99308 SBSQ NF CARE LOW MDM 20: CPT | Performed by: NURSE PRACTITIONER

## 2021-10-11 ENCOUNTER — NURSING HOME VISIT (OUTPATIENT)
Dept: GERIATRICS | Facility: OTHER | Age: 56
End: 2021-10-11
Payer: MEDICARE

## 2021-10-11 DIAGNOSIS — K59.09 CHRONIC CONSTIPATION: ICD-10-CM

## 2021-10-11 DIAGNOSIS — K21.9 GASTROESOPHAGEAL REFLUX DISEASE, UNSPECIFIED WHETHER ESOPHAGITIS PRESENT: ICD-10-CM

## 2021-10-11 DIAGNOSIS — I10 ESSENTIAL HYPERTENSION: ICD-10-CM

## 2021-10-11 DIAGNOSIS — G80.9 CEREBRAL PALSY, UNSPECIFIED TYPE (HCC): ICD-10-CM

## 2021-10-11 DIAGNOSIS — G93.41 ACUTE METABOLIC ENCEPHALOPATHY: ICD-10-CM

## 2021-10-11 DIAGNOSIS — U07.1 COVID-19 VIRUS INFECTION: Primary | ICD-10-CM

## 2021-10-11 DIAGNOSIS — R53.81 PHYSICAL DECONDITIONING: ICD-10-CM

## 2021-10-11 DIAGNOSIS — L97.519 SKIN ULCERS OF BOTH FEET (HCC): ICD-10-CM

## 2021-10-11 DIAGNOSIS — L97.529 SKIN ULCERS OF BOTH FEET (HCC): ICD-10-CM

## 2021-10-11 DIAGNOSIS — E03.9 HYPOTHYROIDISM, UNSPECIFIED TYPE: ICD-10-CM

## 2021-10-11 PROCEDURE — 99309 SBSQ NF CARE MODERATE MDM 30: CPT | Performed by: NURSE PRACTITIONER

## 2021-10-12 RX ORDER — ACETAMINOPHEN 325 MG/1
650 TABLET ORAL 2 TIMES DAILY
COMMUNITY

## 2021-10-13 ENCOUNTER — NURSING HOME VISIT (OUTPATIENT)
Dept: WOUND CARE | Facility: HOSPITAL | Age: 56
End: 2021-10-13
Payer: MEDICARE

## 2021-10-13 DIAGNOSIS — L97.519 SKIN ULCERS OF BOTH FEET (HCC): Primary | ICD-10-CM

## 2021-10-13 DIAGNOSIS — L97.529 SKIN ULCERS OF BOTH FEET (HCC): Primary | ICD-10-CM

## 2021-10-13 DIAGNOSIS — R26.2 AMBULATORY DYSFUNCTION: ICD-10-CM

## 2021-10-13 PROCEDURE — 99308 SBSQ NF CARE LOW MDM 20: CPT | Performed by: NURSE PRACTITIONER

## 2021-10-14 ENCOUNTER — NURSING HOME VISIT (OUTPATIENT)
Dept: GERIATRICS | Facility: OTHER | Age: 56
End: 2021-10-14
Payer: MEDICARE

## 2021-10-14 DIAGNOSIS — G80.9 CEREBRAL PALSY, UNSPECIFIED TYPE (HCC): ICD-10-CM

## 2021-10-14 DIAGNOSIS — R13.10 DYSPHAGIA, UNSPECIFIED TYPE: ICD-10-CM

## 2021-10-14 DIAGNOSIS — I10 ESSENTIAL HYPERTENSION: ICD-10-CM

## 2021-10-14 DIAGNOSIS — L97.529 SKIN ULCERS OF BOTH FEET (HCC): ICD-10-CM

## 2021-10-14 DIAGNOSIS — U07.1 COVID-19 VIRUS INFECTION: Primary | ICD-10-CM

## 2021-10-14 DIAGNOSIS — M79.7 FIBROMYALGIA: ICD-10-CM

## 2021-10-14 DIAGNOSIS — R53.81 PHYSICAL DECONDITIONING: ICD-10-CM

## 2021-10-14 DIAGNOSIS — N39.0 RECURRENT UTI: ICD-10-CM

## 2021-10-14 DIAGNOSIS — E87.5 HYPERKALEMIA: ICD-10-CM

## 2021-10-14 DIAGNOSIS — L97.519 SKIN ULCERS OF BOTH FEET (HCC): ICD-10-CM

## 2021-10-14 PROBLEM — G93.41 ACUTE METABOLIC ENCEPHALOPATHY: Status: RESOLVED | Noted: 2019-12-02 | Resolved: 2021-10-14

## 2021-10-14 PROBLEM — N17.9 AKI (ACUTE KIDNEY INJURY) (HCC): Status: RESOLVED | Noted: 2021-09-15 | Resolved: 2021-10-14

## 2021-10-14 PROCEDURE — 99309 SBSQ NF CARE MODERATE MDM 30: CPT | Performed by: NURSE PRACTITIONER

## 2021-10-14 RX ORDER — DULOXETIN HYDROCHLORIDE 30 MG/1
30 CAPSULE, DELAYED RELEASE ORAL DAILY
COMMUNITY

## 2021-10-14 RX ORDER — VALSARTAN 320 MG/1
240 TABLET ORAL DAILY
COMMUNITY

## 2021-10-14 RX ORDER — MELATONIN
1000 DAILY
COMMUNITY

## 2021-10-14 RX ORDER — AMLODIPINE BESYLATE 2.5 MG/1
2.5 TABLET ORAL DAILY
COMMUNITY

## 2021-10-18 ENCOUNTER — TELEPHONE (OUTPATIENT)
Dept: NEUROLOGY | Facility: CLINIC | Age: 56
End: 2021-10-18

## 2021-10-20 ENCOUNTER — NURSING HOME VISIT (OUTPATIENT)
Dept: GERIATRICS | Facility: OTHER | Age: 56
End: 2021-10-20
Payer: MEDICARE

## 2021-10-20 ENCOUNTER — NURSING HOME VISIT (OUTPATIENT)
Dept: WOUND CARE | Facility: HOSPITAL | Age: 56
End: 2021-10-20
Payer: MEDICARE

## 2021-10-20 VITALS
TEMPERATURE: 97.9 F | RESPIRATION RATE: 18 BRPM | SYSTOLIC BLOOD PRESSURE: 116 MMHG | OXYGEN SATURATION: 98 % | DIASTOLIC BLOOD PRESSURE: 86 MMHG | HEART RATE: 83 BPM

## 2021-10-20 DIAGNOSIS — I10 ESSENTIAL HYPERTENSION: ICD-10-CM

## 2021-10-20 DIAGNOSIS — U07.1 COVID-19 VIRUS INFECTION: Primary | ICD-10-CM

## 2021-10-20 DIAGNOSIS — R26.2 AMBULATORY DYSFUNCTION: ICD-10-CM

## 2021-10-20 DIAGNOSIS — L97.529 SKIN ULCERS OF BOTH FEET (HCC): Primary | ICD-10-CM

## 2021-10-20 DIAGNOSIS — G80.9 CEREBRAL PALSY, UNSPECIFIED TYPE (HCC): ICD-10-CM

## 2021-10-20 DIAGNOSIS — L97.529 SKIN ULCERS OF BOTH FEET (HCC): ICD-10-CM

## 2021-10-20 DIAGNOSIS — M79.7 FIBROMYALGIA: ICD-10-CM

## 2021-10-20 DIAGNOSIS — E87.5 HYPERKALEMIA: ICD-10-CM

## 2021-10-20 DIAGNOSIS — R13.10 DYSPHAGIA, UNSPECIFIED TYPE: ICD-10-CM

## 2021-10-20 DIAGNOSIS — R53.81 PHYSICAL DECONDITIONING: ICD-10-CM

## 2021-10-20 DIAGNOSIS — L97.519 SKIN ULCERS OF BOTH FEET (HCC): ICD-10-CM

## 2021-10-20 DIAGNOSIS — L97.519 SKIN ULCERS OF BOTH FEET (HCC): Primary | ICD-10-CM

## 2021-10-20 PROCEDURE — 99309 SBSQ NF CARE MODERATE MDM 30: CPT | Performed by: NURSE PRACTITIONER

## 2021-10-20 PROCEDURE — 99307 SBSQ NF CARE SF MDM 10: CPT | Performed by: NURSE PRACTITIONER

## 2021-10-22 ENCOUNTER — NURSING HOME VISIT (OUTPATIENT)
Dept: GERIATRICS | Facility: OTHER | Age: 56
End: 2021-10-22
Payer: MEDICARE

## 2021-10-22 VITALS
TEMPERATURE: 97.7 F | OXYGEN SATURATION: 97 % | HEART RATE: 80 BPM | DIASTOLIC BLOOD PRESSURE: 86 MMHG | RESPIRATION RATE: 18 BRPM | SYSTOLIC BLOOD PRESSURE: 122 MMHG

## 2021-10-22 DIAGNOSIS — R13.10 DYSPHAGIA, UNSPECIFIED TYPE: Primary | ICD-10-CM

## 2021-10-22 DIAGNOSIS — U07.1 COVID-19 VIRUS INFECTION: ICD-10-CM

## 2021-10-22 DIAGNOSIS — I10 ESSENTIAL HYPERTENSION: ICD-10-CM

## 2021-10-22 DIAGNOSIS — R53.81 PHYSICAL DECONDITIONING: ICD-10-CM

## 2021-10-22 DIAGNOSIS — G80.9 CEREBRAL PALSY, UNSPECIFIED TYPE (HCC): ICD-10-CM

## 2021-10-22 DIAGNOSIS — R26.2 AMBULATORY DYSFUNCTION: ICD-10-CM

## 2021-10-22 DIAGNOSIS — E03.9 HYPOTHYROIDISM, UNSPECIFIED TYPE: ICD-10-CM

## 2021-10-22 PROCEDURE — 99309 SBSQ NF CARE MODERATE MDM 30: CPT | Performed by: NURSE PRACTITIONER

## 2021-10-27 ENCOUNTER — NURSING HOME VISIT (OUTPATIENT)
Dept: WOUND CARE | Facility: HOSPITAL | Age: 56
End: 2021-10-27
Payer: MEDICARE

## 2021-10-27 DIAGNOSIS — L97.519 SKIN ULCERS OF BOTH FEET (HCC): Primary | ICD-10-CM

## 2021-10-27 DIAGNOSIS — R26.2 AMBULATORY DYSFUNCTION: ICD-10-CM

## 2021-10-27 DIAGNOSIS — L97.529 SKIN ULCERS OF BOTH FEET (HCC): Primary | ICD-10-CM

## 2021-10-27 PROCEDURE — 99307 SBSQ NF CARE SF MDM 10: CPT | Performed by: NURSE PRACTITIONER

## 2021-10-28 ENCOUNTER — NURSING HOME VISIT (OUTPATIENT)
Dept: GERIATRICS | Facility: OTHER | Age: 56
End: 2021-10-28
Payer: MEDICARE

## 2021-10-28 VITALS
RESPIRATION RATE: 18 BRPM | DIASTOLIC BLOOD PRESSURE: 68 MMHG | TEMPERATURE: 97.6 F | HEART RATE: 93 BPM | OXYGEN SATURATION: 98 % | SYSTOLIC BLOOD PRESSURE: 134 MMHG

## 2021-10-28 DIAGNOSIS — L97.519 SKIN ULCERS OF BOTH FEET (HCC): ICD-10-CM

## 2021-10-28 DIAGNOSIS — I10 ESSENTIAL HYPERTENSION: ICD-10-CM

## 2021-10-28 DIAGNOSIS — U07.1 COVID-19 VIRUS INFECTION: Primary | ICD-10-CM

## 2021-10-28 DIAGNOSIS — G80.9 CEREBRAL PALSY, UNSPECIFIED TYPE (HCC): ICD-10-CM

## 2021-10-28 DIAGNOSIS — R53.81 PHYSICAL DECONDITIONING: ICD-10-CM

## 2021-10-28 DIAGNOSIS — E03.9 HYPOTHYROIDISM, UNSPECIFIED TYPE: ICD-10-CM

## 2021-10-28 DIAGNOSIS — L97.529 SKIN ULCERS OF BOTH FEET (HCC): ICD-10-CM

## 2021-10-28 PROCEDURE — 99309 SBSQ NF CARE MODERATE MDM 30: CPT | Performed by: NURSE PRACTITIONER

## 2021-11-02 ENCOUNTER — NURSING HOME VISIT (OUTPATIENT)
Dept: GERIATRICS | Facility: OTHER | Age: 56
End: 2021-11-02
Payer: MEDICARE

## 2021-11-02 VITALS
DIASTOLIC BLOOD PRESSURE: 63 MMHG | RESPIRATION RATE: 18 BRPM | OXYGEN SATURATION: 98 % | SYSTOLIC BLOOD PRESSURE: 123 MMHG | HEART RATE: 98 BPM | TEMPERATURE: 97.6 F

## 2021-11-02 DIAGNOSIS — E03.9 HYPOTHYROIDISM, UNSPECIFIED TYPE: ICD-10-CM

## 2021-11-02 DIAGNOSIS — L97.519 SKIN ULCERS OF BOTH FEET (HCC): ICD-10-CM

## 2021-11-02 DIAGNOSIS — M79.7 FIBROMYALGIA: ICD-10-CM

## 2021-11-02 DIAGNOSIS — R53.81 PHYSICAL DECONDITIONING: ICD-10-CM

## 2021-11-02 DIAGNOSIS — I10 ESSENTIAL HYPERTENSION: ICD-10-CM

## 2021-11-02 DIAGNOSIS — L97.529 SKIN ULCERS OF BOTH FEET (HCC): ICD-10-CM

## 2021-11-02 DIAGNOSIS — G80.9 CEREBRAL PALSY, UNSPECIFIED TYPE (HCC): Primary | ICD-10-CM

## 2021-11-02 DIAGNOSIS — R13.10 DYSPHAGIA, UNSPECIFIED TYPE: ICD-10-CM

## 2021-11-02 PROCEDURE — 99309 SBSQ NF CARE MODERATE MDM 30: CPT | Performed by: NURSE PRACTITIONER

## 2021-11-03 ENCOUNTER — NURSING HOME VISIT (OUTPATIENT)
Dept: WOUND CARE | Facility: HOSPITAL | Age: 56
End: 2021-11-03
Payer: MEDICARE

## 2021-11-03 DIAGNOSIS — L97.529 SKIN ULCERS OF BOTH FEET (HCC): Primary | ICD-10-CM

## 2021-11-03 DIAGNOSIS — R26.2 AMBULATORY DYSFUNCTION: ICD-10-CM

## 2021-11-03 DIAGNOSIS — L97.519 SKIN ULCERS OF BOTH FEET (HCC): Primary | ICD-10-CM

## 2021-11-03 PROCEDURE — 99308 SBSQ NF CARE LOW MDM 20: CPT | Performed by: NURSE PRACTITIONER

## 2021-11-10 ENCOUNTER — NURSING HOME VISIT (OUTPATIENT)
Dept: WOUND CARE | Facility: HOSPITAL | Age: 56
End: 2021-11-10
Payer: MEDICARE

## 2021-11-10 DIAGNOSIS — L97.519 SKIN ULCERS OF BOTH FEET (HCC): Primary | ICD-10-CM

## 2021-11-10 DIAGNOSIS — L97.529 SKIN ULCERS OF BOTH FEET (HCC): Primary | ICD-10-CM

## 2021-11-10 DIAGNOSIS — R26.2 AMBULATORY DYSFUNCTION: ICD-10-CM

## 2021-11-10 PROCEDURE — 99308 SBSQ NF CARE LOW MDM 20: CPT | Performed by: NURSE PRACTITIONER

## 2021-11-11 ENCOUNTER — HOSPITAL ENCOUNTER (EMERGENCY)
Facility: HOSPITAL | Age: 56
Discharge: HOME | End: 2021-11-12
Attending: EMERGENCY MEDICINE
Payer: MEDICARE

## 2021-11-11 DIAGNOSIS — S91.119A TOE LACERATION: Primary | ICD-10-CM

## 2021-11-11 PROCEDURE — 99283 EMERGENCY DEPT VISIT LOW MDM: CPT

## 2021-11-11 RX ORDER — LIDOCAINE HYDROCHLORIDE 10 MG/ML
10 INJECTION, SOLUTION EPIDURAL; INFILTRATION; INTRACAUDAL; PERINEURAL ONCE
Status: COMPLETED | OUTPATIENT
Start: 2021-11-11 | End: 2021-11-12

## 2021-11-12 VITALS
RESPIRATION RATE: 20 BRPM | SYSTOLIC BLOOD PRESSURE: 117 MMHG | OXYGEN SATURATION: 95 % | TEMPERATURE: 98.4 F | HEART RATE: 83 BPM | DIASTOLIC BLOOD PRESSURE: 62 MMHG

## 2021-11-12 PROCEDURE — 99282 EMERGENCY DEPT VISIT SF MDM: CPT | Performed by: STUDENT IN AN ORGANIZED HEALTH CARE EDUCATION/TRAINING PROGRAM

## 2021-11-12 PROCEDURE — 12001 RPR S/N/AX/GEN/TRNK 2.5CM/<: CPT | Performed by: STUDENT IN AN ORGANIZED HEALTH CARE EDUCATION/TRAINING PROGRAM

## 2021-11-12 RX ADMIN — LIDOCAINE HYDROCHLORIDE 10 ML: 10 INJECTION, SOLUTION EPIDURAL; INFILTRATION; INTRACAUDAL; PERINEURAL at 00:23

## 2021-11-17 ENCOUNTER — NURSING HOME VISIT (OUTPATIENT)
Dept: WOUND CARE | Facility: HOSPITAL | Age: 56
End: 2021-11-17
Payer: MEDICARE

## 2021-11-17 DIAGNOSIS — S91.115A LACERATION OF FIFTH TOE OF LEFT FOOT, INITIAL ENCOUNTER: ICD-10-CM

## 2021-11-17 DIAGNOSIS — L97.529 SKIN ULCERS OF BOTH FEET (HCC): Primary | ICD-10-CM

## 2021-11-17 DIAGNOSIS — L97.519 SKIN ULCERS OF BOTH FEET (HCC): Primary | ICD-10-CM

## 2021-11-17 DIAGNOSIS — R26.2 AMBULATORY DYSFUNCTION: ICD-10-CM

## 2021-11-17 PROCEDURE — 97597 DBRDMT OPN WND 1ST 20 CM/<: CPT | Performed by: NURSE PRACTITIONER

## 2021-11-24 ENCOUNTER — NURSING HOME VISIT (OUTPATIENT)
Dept: SURGERY | Facility: CLINIC | Age: 56
End: 2021-11-24
Payer: MEDICARE

## 2021-11-24 DIAGNOSIS — S91.115S: Primary | ICD-10-CM

## 2021-11-24 PROCEDURE — 99308 SBSQ NF CARE LOW MDM 20: CPT | Performed by: SURGERY

## 2021-12-01 ENCOUNTER — NURSING HOME VISIT (OUTPATIENT)
Dept: WOUND CARE | Facility: HOSPITAL | Age: 56
End: 2021-12-01
Payer: MEDICARE

## 2021-12-01 DIAGNOSIS — S91.115A LACERATION OF FIFTH TOE OF LEFT FOOT, INITIAL ENCOUNTER: ICD-10-CM

## 2021-12-01 DIAGNOSIS — L97.519 SKIN ULCERS OF BOTH FEET (HCC): Primary | ICD-10-CM

## 2021-12-01 DIAGNOSIS — L97.529 SKIN ULCERS OF BOTH FEET (HCC): Primary | ICD-10-CM

## 2021-12-01 DIAGNOSIS — R26.2 AMBULATORY DYSFUNCTION: ICD-10-CM

## 2021-12-01 PROCEDURE — 99308 SBSQ NF CARE LOW MDM 20: CPT | Performed by: NURSE PRACTITIONER

## 2021-12-08 ENCOUNTER — NURSING HOME VISIT (OUTPATIENT)
Dept: WOUND CARE | Facility: HOSPITAL | Age: 56
End: 2021-12-08
Payer: MEDICARE

## 2021-12-08 DIAGNOSIS — R26.2 AMBULATORY DYSFUNCTION: ICD-10-CM

## 2021-12-08 DIAGNOSIS — L97.529 SKIN ULCERS OF BOTH FEET (HCC): Primary | ICD-10-CM

## 2021-12-08 DIAGNOSIS — L97.519 SKIN ULCERS OF BOTH FEET (HCC): Primary | ICD-10-CM

## 2021-12-08 PROCEDURE — 99308 SBSQ NF CARE LOW MDM 20: CPT | Performed by: NURSE PRACTITIONER

## 2021-12-15 ENCOUNTER — NURSING HOME VISIT (OUTPATIENT)
Dept: WOUND CARE | Facility: HOSPITAL | Age: 56
End: 2021-12-15
Payer: MEDICARE

## 2021-12-15 DIAGNOSIS — L97.529 SKIN ULCERS OF BOTH FEET (HCC): Primary | ICD-10-CM

## 2021-12-15 DIAGNOSIS — L97.519 SKIN ULCERS OF BOTH FEET (HCC): Primary | ICD-10-CM

## 2021-12-15 DIAGNOSIS — R26.2 AMBULATORY DYSFUNCTION: ICD-10-CM

## 2021-12-15 PROCEDURE — 99307 SBSQ NF CARE SF MDM 10: CPT | Performed by: NURSE PRACTITIONER

## 2021-12-17 ENCOUNTER — NURSING HOME VISIT (OUTPATIENT)
Dept: GERIATRICS | Facility: OTHER | Age: 56
End: 2021-12-17
Payer: MEDICARE

## 2021-12-17 VITALS
HEART RATE: 74 BPM | BODY MASS INDEX: 27.22 KG/M2 | TEMPERATURE: 97.3 F | DIASTOLIC BLOOD PRESSURE: 78 MMHG | WEIGHT: 139.4 LBS | RESPIRATION RATE: 18 BRPM | OXYGEN SATURATION: 95 % | SYSTOLIC BLOOD PRESSURE: 132 MMHG

## 2021-12-17 DIAGNOSIS — E03.9 HYPOTHYROIDISM, UNSPECIFIED TYPE: Primary | ICD-10-CM

## 2021-12-17 PROCEDURE — 99309 SBSQ NF CARE MODERATE MDM 30: CPT | Performed by: INTERNAL MEDICINE

## 2022-01-26 ENCOUNTER — NURSING HOME VISIT (OUTPATIENT)
Dept: GERIATRICS | Facility: OTHER | Age: 57
End: 2022-01-26
Payer: MEDICARE

## 2022-01-26 VITALS
DIASTOLIC BLOOD PRESSURE: 72 MMHG | RESPIRATION RATE: 18 BRPM | HEART RATE: 82 BPM | OXYGEN SATURATION: 97 % | TEMPERATURE: 97.6 F | SYSTOLIC BLOOD PRESSURE: 132 MMHG

## 2022-01-26 DIAGNOSIS — R26.2 AMBULATORY DYSFUNCTION: ICD-10-CM

## 2022-01-26 DIAGNOSIS — I10 ESSENTIAL HYPERTENSION: Primary | ICD-10-CM

## 2022-01-26 DIAGNOSIS — R13.10 DYSPHAGIA, UNSPECIFIED TYPE: ICD-10-CM

## 2022-01-26 DIAGNOSIS — E03.9 HYPOTHYROIDISM, UNSPECIFIED TYPE: ICD-10-CM

## 2022-01-26 DIAGNOSIS — K59.09 CHRONIC CONSTIPATION: ICD-10-CM

## 2022-01-26 DIAGNOSIS — G80.9 CEREBRAL PALSY, UNSPECIFIED TYPE (HCC): ICD-10-CM

## 2022-01-26 PROBLEM — K59.00 CONSTIPATION, UNSPECIFIED: Status: RESOLVED | Noted: 2019-12-23 | Resolved: 2022-01-26

## 2022-01-26 PROBLEM — U07.1 COVID-19 VIRUS INFECTION: Status: RESOLVED | Noted: 2021-09-13 | Resolved: 2022-01-26

## 2022-01-26 PROCEDURE — 99309 SBSQ NF CARE MODERATE MDM 30: CPT | Performed by: NURSE PRACTITIONER

## 2022-01-26 NOTE — PROGRESS NOTES
[facility]  [address]  [phone]  [facility] Monthly Note  32      NAME: Carla Olmedo  AGE: 61 y o  SEX: female    DATE OF ENCOUNTER: 1/26/2022    Assessment and Plan     Problem List Items Addressed This Visit        Digestive    Dysphagia     · Currently on puree diet, NTL  · Aspiration precautions         Chronic constipation     · Stable at present  · Remains on linzess, senna  · Cont to encourage dietary fiber, fluids, mobility as able  Endocrine    Hypothyroidism     · No ss hyper/hypoactive thyroid  · TSH 1 45 on 12/20  · Cont levothyroxine 150mcg daily            Cardiovascular and Mediastinum    Essential hypertension - Primary     · BP stable at present  · Cont amlodipine, valsartan  · Cont dietary /lifestyle interventions  Cont monitoring            Nervous and Auditory    Cerebral palsy (HCC)     · Stable at present  · Cont 24/7 supportive care for assist with adl/iadls  · Monitor skin integrity and po intake            Other    Ambulatory dysfunction     · Chair bound at present  · Cont supportive care  · Turn and reposition frequently               No orders of the defined types were placed in this encounter       - Counseling Documentation: patient was counseled regarding: diagnostic results, instructions for management and patient and family education    Chief Complaint     No chief complaint on file  History of Present Illness     Ms Gustavo Salazar is 57yo female LT resident of [facility] seen for monthly eval   She has comorbidities including cerebral palsy,  Htn, hypothyroid    Patient seen sitting in common room  She is nonverbal, unable to obtain history  She is calm, appears comfortable  No issues per nursing    She is on puree/NTL diet      The following portions of the patient's history were reviewed and updated as appropriate: allergies, current medications, past family history, past medical history, past social history, past surgical history and problem list     Review of Systems     Review of Systems   Unable to perform ROS: Patient nonverbal       Active Problem List     Patient Active Problem List   Diagnosis    Neuromuscular dysfunction of bladder    Recurrent UTI    Urinary incontinence    Acute vaginitis    Hypernatremia    Dysphagia    Skin ulcers of both feet (HCC)    Physical deconditioning    Dermatitis associated with incontinence    Chronic constipation    Cerebral palsy ([address] )    Essential hypertension    GERD (gastroesophageal reflux disease)    Hypothyroidism    Ambulatory dysfunction    Hyperkalemia    Fibromyalgia    Laceration of fifth toe of left foot    Arthritis       Objective     /72   Pulse 82   Temp 97 6 °F (36 4 °C)   Resp 18   SpO2 97%     Physical Exam  Vitals and nursing note reviewed  Constitutional:       General: She is not in acute distress  Appearance: Normal appearance  She is well-developed  She is not diaphoretic  HENT:      Head: Normocephalic  Cardiovascular:      Rate and Rhythm: Normal rate  Heart sounds: No murmur heard  No friction rub  No gallop  Pulmonary:      Effort: Pulmonary effort is normal  No respiratory distress  Breath sounds: Normal breath sounds  No wheezing or rales  Abdominal:      General: Bowel sounds are normal  There is no distension  Palpations: Abdomen is soft  Tenderness: There is no abdominal tenderness  There is no rebound  Musculoskeletal:         General: Swelling (bilat pedal- trace) present  Normal range of motion  Skin:     General: Skin is warm and dry  Neurological:      General: No focal deficit present  Mental Status: She is alert  Mental status is at baseline  Comments: Nonverbal, doesn't follow commands  Does reach out and hold hands  Pleasant, calm         Pertinent Laboratory/Diagnostic Studies:  Labs reviewed in facility paper chart      Current Medications   Medications were reviewed and updated in Epic      Current Outpatient Medications:     acetaminophen (TYLENOL) 325 mg tablet, Take 650 mg by mouth every 6 (six) hours as needed, Disp: , Rfl:     acetaminophen (TYLENOL) 325 mg tablet, Take 650 mg by mouth 2 (two) times a day, Disp: , Rfl:     amLODIPine (NORVASC) 2 5 mg tablet, Take 2 5 mg by mouth daily GIVE FOR SBP >130, Disp: , Rfl:     aspirin 81 mg chewable tablet, Chew 1 tablet (81 mg total) daily for 7 days, Disp: 7 tablet, Rfl: 0    Balsam [place] Oil (Venelex) OINT, Apply 1 application topically 2 (two) times a day for 7 days  - apply to buttocks in AM + PM until resolved, Disp: 30 g, Rfl: 1    bisacodyl (DULCOLAX) 10 mg suppository, Insert 10 mg into the rectum daily as needed, Disp: , Rfl:     cholecalciferol (VITAMIN D3) 1,000 units tablet, Take 1,000 Units by mouth daily, Disp: , Rfl:     DULoxetine (CYMBALTA) 30 mg delayed release capsule, Take 30 mg by mouth daily FOR FIBROMYALGIA, Disp: , Rfl:     Garlic 0050 MG CAPS, Take 1 capsule by mouth daily, Disp: , Rfl:     lansoprazole (PREVACID) 30 mg capsule, TAKE 1 CAPSULE BY MOUTH EVERY DAY, Disp: 90 capsule, Rfl: 3    levothyroxine 150 mcg tablet, Take 1 tablet (150 mcg total) by mouth daily, Disp: 90 tablet, Rfl: 1    Linzess 145 MCG CAPS, TAKE 1 CAPSULE BY MOUTH EVERY DAY, Disp: 90 capsule, Rfl: 1    magnesium hydroxide (MILK OF MAGNESIA) 400 mg/5 mL oral suspension, Take 30 mL by mouth daily as needed, Disp: , Rfl:     meloxicam (MOBIC) 7 5 mg tablet, Take 1 tablet (7 5 mg total) by mouth daily for 7 days, Disp: 7 tablet, Rfl: 0    senna (SENOKOT) 8 6 MG tablet, Take 1 tablet (8 6 mg total) by mouth daily for 7 days, Disp: 7 tablet, Rfl: 0    Sodium Phosphates (FLEET ENEMA RE), Insert 1 Bottle into the rectum daily as needed, Disp: , Rfl:     valsartan (DIOVAN) 320 MG tablet, Take 240 mg by mouth daily PATIENT [facility] , Disp: , Rfl:     Nazario Benitez, JONATHAN  1/26/2022 12:17 PM

## 2022-01-26 NOTE — ASSESSMENT & PLAN NOTE
· Stable at present  · Cont 24/7 supportive care for assist with adl/iadls  · Monitor skin integrity and po intake

## 2022-01-26 NOTE — ASSESSMENT & PLAN NOTE
· BP stable at present  · Cont amlodipine, valsartan  · Cont dietary /lifestyle interventions    Cont monitoring

## 2022-01-26 NOTE — ASSESSMENT & PLAN NOTE
· Stable at present  · Remains on annshussein  · Cont to encourage dietary fiber, fluids, mobility as able

## 2022-02-25 VITALS
TEMPERATURE: 97.6 F | SYSTOLIC BLOOD PRESSURE: 126 MMHG | DIASTOLIC BLOOD PRESSURE: 76 MMHG | OXYGEN SATURATION: 96 % | RESPIRATION RATE: 18 BRPM | HEART RATE: 88 BPM

## 2022-02-25 PROBLEM — D64.89 OTHER SPECIFIED ANEMIAS: Status: ACTIVE | Noted: 2022-02-25

## 2022-02-25 NOTE — PROGRESS NOTES
[facility]  [address]  [phone]  [facility]    Progress Note 32      NAME: Joaquín Luke  AGE: 61 y o  SEX: female    DATE OF ENCOUNTER: 2/25/2022    Assessment and Plan     Problem List Items Addressed This Visit        Digestive    Chronic constipation - Primary     stable   -continue Linzess, senna, encourage fluid intake   - Fleet enema p r n  Endocrine    Hypothyroidism     TSH was 0 11 on 08/25/2021    - continue current dose of levothyroxine 150 mcg daily  - repeat TSH            Cardiovascular and Mediastinum    Essential hypertension     Blood pressure is under control     - continue amlodipine 2 5 mg, valsartan 240 mg daily  - monitor BP            Nervous and Auditory    Cerebral palsy ([address] )     - continue supportive care  - continue OT/PT                No orders of the defined types were placed in this encounter  Chief Complaint     No chief complaint on file  History of Present Illness     Ms Delfina Adams is 59yo female with history of cerebral palsy,  HTN, hypothyroid who is long-term [facility] resident was seen by me today  Patient was sitting in her wheelchair  She was awake, alert but not responsive, not following commands  She appeared comfortable  There were no concerns from nursing staff  Spastic tetraparesis noted on physical exam, there is bilateral swelling on dorsum of her feet bilaterally, great toe noted with a small black spot which seems to be hyperkeratotic lesion    Heart and lung sounds were normal       The following portions of the patient's history were reviewed and updated as appropriate: allergies, current medications, past family history, past medical history, past social history, past surgical history and problem list     Review of Systems     Review of Systems   Unable to perform ROS: Patient nonverbal       Active Problem List     Patient Active Problem List   Diagnosis    Neuromuscular dysfunction of bladder    Recurrent UTI    Urinary incontinence    Acute vaginitis    Hypernatremia    Dysphagia    Skin ulcers of both feet (HCC)    Physical deconditioning    Dermatitis associated with incontinence    Chronic constipation    Cerebral palsy (HCC)    Essential hypertension    GERD (gastroesophageal reflux disease)    Hypothyroidism    Ambulatory dysfunction    Hyperkalemia    Fibromyalgia    Laceration of fifth toe of left foot    Arthritis    Other specified anemias       Objective     /76   Pulse 88   Temp 97 6 °F (36 4 °C)   Resp 18   SpO2 96%     Physical Exam  Vitals and nursing note reviewed  Constitutional:       General: She is not in acute distress  Appearance: Normal appearance  She is well-developed  She is not diaphoretic  HENT:      Head: Normocephalic  Mouth/Throat:      Mouth: Mucous membranes are moist    Eyes:      General: No scleral icterus  Extraocular Movements: Extraocular movements intact  Conjunctiva/sclera: Conjunctivae normal    Cardiovascular:      Rate and Rhythm: Normal rate  Heart sounds: No murmur heard  No friction rub  No gallop  Pulmonary:      Effort: Pulmonary effort is normal  No respiratory distress  Breath sounds: Normal breath sounds  No wheezing, rhonchi or rales  Abdominal:      General: Bowel sounds are normal  There is no distension  Palpations: Abdomen is soft  Tenderness: There is no abdominal tenderness  There is no rebound  Musculoskeletal:         General: Swelling: Bilateral edema noted on dorsum of both feet  Normal range of motion  Skin:     General: Skin is warm and dry  Coloration: Skin is pale  Findings: Lesion (Small black spot on the 2nd toe of the right foot, there is to be hyperkeratotic in origin) present  No bruising, erythema or rash  Neurological:      Mental Status: She is alert  Mental status is at baseline        Comments: Nonverbal, doesn't follow commands  Nonambulatory   Psychiatric:         Mood and Affect: Mood normal          Pertinent Laboratory/Diagnostic Studies:  Labs reviewed in facility paper chart      Current Medications   Medications were reviewed and updated in [facility]      Current Outpatient Medications:     acetaminophen (TYLENOL) 325 mg tablet, Take 650 mg by mouth every 6 (six) hours as needed, Disp: , Rfl:     acetaminophen (TYLENOL) 325 mg tablet, Take 650 mg by mouth 2 (two) times a day, Disp: , Rfl:     amLODIPine (NORVASC) 2 5 mg tablet, Take 2 5 mg by mouth daily GIVE FOR SBP >130, Disp: , Rfl:     aspirin 81 mg chewable tablet, Chew 1 tablet (81 mg total) daily for 7 days, Disp: 7 tablet, Rfl: 0    Balsam [place] Oil (Venelex) OINT, Apply 1 application topically 2 (two) times a day for 7 days  - apply to buttocks in AM + PM until resolved, Disp: 30 g, Rfl: 1    bisacodyl (DULCOLAX) 10 mg suppository, Insert 10 mg into the rectum daily as needed, Disp: , Rfl:     cholecalciferol (VITAMIN D3) 1,000 units tablet, Take 1,000 Units by mouth daily, Disp: , Rfl:     DULoxetine (CYMBALTA) 30 mg delayed release capsule, Take 30 mg by mouth daily FOR FIBROMYALGIA, Disp: , Rfl:     Garlic 1033 MG CAPS, Take 1 capsule by mouth daily, Disp: , Rfl:     lansoprazole (PREVACID) 30 mg capsule, TAKE 1 CAPSULE BY MOUTH EVERY DAY, Disp: 90 capsule, Rfl: 3    levothyroxine 150 mcg tablet, Take 1 tablet (150 mcg total) by mouth daily, Disp: 90 tablet, Rfl: 1    Linzess 145 MCG CAPS, TAKE 1 CAPSULE BY MOUTH EVERY DAY, Disp: 90 capsule, Rfl: 1    magnesium hydroxide (MILK OF MAGNESIA) 400 mg/5 mL oral suspension, Take 30 mL by mouth daily as needed, Disp: , Rfl:     meloxicam (MOBIC) 7 5 mg tablet, Take 1 tablet (7 5 mg total) by mouth daily for 7 days, Disp: 7 tablet, Rfl: 0    senna (SENOKOT) 8 6 MG tablet, Take 1 tablet (8 6 mg total) by mouth daily for 7 days, Disp: 7 tablet, Rfl: 0    Sodium Phosphates (FLEET ENEMA RE), Insert 1 Bottle into the rectum daily as needed, Disp: , Rfl:     valsartan (DIOVAN) 320 MG tablet, Take 240 mg by mouth daily PATIENT IS CURRENTLY  MG DAILY AT [facility] , Disp: , Rfl:     JONATHAN Dumont  2/25/2022 2:21 PM

## 2022-02-25 NOTE — ASSESSMENT & PLAN NOTE
Blood pressure is under control     - continue amlodipine 2 5 mg, valsartan 240 mg daily  - monitor BP

## 2022-02-25 NOTE — ASSESSMENT & PLAN NOTE
Noted with macrocytic anemia  Baseline hemoglobin is around 10-11 5  B12 level was elevated and follow level was normal in August 2021  No signs of bleeding    - monitor CBC

## 2022-02-26 ENCOUNTER — NURSING HOME VISIT (OUTPATIENT)
Dept: GERIATRICS | Facility: OTHER | Age: 57
End: 2022-02-26
Payer: MEDICARE

## 2022-02-26 DIAGNOSIS — G80.9 CEREBRAL PALSY, UNSPECIFIED TYPE (HCC): ICD-10-CM

## 2022-02-26 DIAGNOSIS — E03.9 HYPOTHYROIDISM, UNSPECIFIED TYPE: ICD-10-CM

## 2022-02-26 DIAGNOSIS — K59.09 CHRONIC CONSTIPATION: Primary | ICD-10-CM

## 2022-02-26 DIAGNOSIS — I10 ESSENTIAL HYPERTENSION: ICD-10-CM

## 2022-02-26 PROCEDURE — 99309 SBSQ NF CARE MODERATE MDM 30: CPT | Performed by: INTERNAL MEDICINE

## 2022-03-18 ENCOUNTER — NURSING HOME VISIT (OUTPATIENT)
Dept: GERIATRICS | Facility: OTHER | Age: 57
End: 2022-03-18
Payer: MEDICARE

## 2022-03-18 VITALS
RESPIRATION RATE: 18 BRPM | DIASTOLIC BLOOD PRESSURE: 74 MMHG | HEART RATE: 89 BPM | OXYGEN SATURATION: 98 % | SYSTOLIC BLOOD PRESSURE: 129 MMHG | TEMPERATURE: 98.3 F

## 2022-03-18 DIAGNOSIS — K59.09 CHRONIC CONSTIPATION: ICD-10-CM

## 2022-03-18 DIAGNOSIS — R26.2 AMBULATORY DYSFUNCTION: ICD-10-CM

## 2022-03-18 DIAGNOSIS — G80.9 CEREBRAL PALSY, UNSPECIFIED TYPE (HCC): ICD-10-CM

## 2022-03-18 DIAGNOSIS — D55.9 ANEMIA DUE TO ENZYME DISORDER (HCC): ICD-10-CM

## 2022-03-18 DIAGNOSIS — I10 ESSENTIAL HYPERTENSION: Primary | ICD-10-CM

## 2022-03-18 DIAGNOSIS — R13.10 DYSPHAGIA, UNSPECIFIED TYPE: ICD-10-CM

## 2022-03-18 DIAGNOSIS — E03.9 HYPOTHYROIDISM, UNSPECIFIED TYPE: ICD-10-CM

## 2022-03-18 PROCEDURE — 99309 SBSQ NF CARE MODERATE MDM 30: CPT | Performed by: NURSE PRACTITIONER

## 2022-03-19 NOTE — ASSESSMENT & PLAN NOTE
· Baseline hemoglobin appears to be between 10-11 5   · Noted to have macrocytic anemia   · No active signs of bleeding   · Will repeat CBC with next lab workwork

## 2022-03-19 NOTE — ASSESSMENT & PLAN NOTE
· Most recent TSH 1 45 on 12/20/2021  · Continue levothyroxine 150 mcg daily   · Repeat TSH with next set of lab work

## 2022-03-19 NOTE — ASSESSMENT & PLAN NOTE
· Blood pressure log reviewed and stable, blood pressure today 129/74  · Blood pressures have trended between 96/62 to 154/86 over the last month is   · Continue with current medication regimen of amlodipine 2 5 mg daily and low-salt continue 40 mg daily   · Avoid hypotension   · Periodic lab work to monitor renal function   · Continue to monitor and adjust medications as needed

## 2022-03-19 NOTE — ASSESSMENT & PLAN NOTE
· Currently stable  · Per chart review has been having bowel movement every day or 2   · Continue current medication regimen of hussein Mcrae  · Encourage adequate p o  hydration   · Continue to monitor

## 2022-03-19 NOTE — ASSESSMENT & PLAN NOTE
· Wheelchair bound at baseline   · Out of bed as tolerated   · Continue supportive care at staff   · Pressure offloading while in bed   · Encourage adequate hydration and nutrition

## 2022-03-19 NOTE — ASSESSMENT & PLAN NOTE
· History of cerebral palsy   · Continue supportive care and treatment at SNF  · Continue PT/OT as needed

## 2022-03-19 NOTE — ASSESSMENT & PLAN NOTE
· Currently on pureed diet with nectar thick liquids   · continue aspiration precautions  · Speech therapy consult as needed

## 2022-05-12 ENCOUNTER — NURSING HOME VISIT (OUTPATIENT)
Dept: GERIATRICS | Facility: OTHER | Age: 57
End: 2022-05-12
Payer: MEDICARE

## 2022-05-12 DIAGNOSIS — I10 ESSENTIAL HYPERTENSION: ICD-10-CM

## 2022-05-12 DIAGNOSIS — R26.2 AMBULATORY DYSFUNCTION: Primary | ICD-10-CM

## 2022-05-12 DIAGNOSIS — E03.9 HYPOTHYROIDISM, UNSPECIFIED TYPE: ICD-10-CM

## 2022-05-12 DIAGNOSIS — K59.09 CHRONIC CONSTIPATION: ICD-10-CM

## 2022-05-12 DIAGNOSIS — R53.81 PHYSICAL DECONDITIONING: ICD-10-CM

## 2022-05-12 DIAGNOSIS — G80.9 CEREBRAL PALSY, UNSPECIFIED TYPE (HCC): ICD-10-CM

## 2022-05-12 DIAGNOSIS — R13.10 DYSPHAGIA, UNSPECIFIED TYPE: ICD-10-CM

## 2022-05-12 PROCEDURE — 99309 SBSQ NF CARE MODERATE MDM 30: CPT | Performed by: NURSE PRACTITIONER

## 2022-05-17 NOTE — ASSESSMENT & PLAN NOTE
Blood pressure log reviewed and stable   Blood pressure today 130/74  SBPs 110 to 152 5/7-12  Continue with current medication regimen of amlodipine 2 5 mg daily and valsartan  240 mg daily  Continue low-salt diet  Will continue periodical lab work to monitor renal function   Continue to monitor blood pressure and adjust medication as needed

## 2022-05-17 NOTE — ASSESSMENT & PLAN NOTE
Multifactorial   With known history of cerebral palsy and COVID-19 infection- resolved   Continue PT/OT   Continue with fall and safety precaution   Continue with supportive care at short-term rehab with ADLs   Continue with pressure offloading as tolerated   Ensure adequate p o  hydration and nutrition   Patient is a complete feet continue to assist   Continue with supplement   Continue with weights

## 2022-05-17 NOTE — ASSESSMENT & PLAN NOTE
Currently stable with no reportable events   Per chart review has been having bowel movement every day, recent bowel movement on 05/10 at 845 in the morning   Continue current medication regimen of Linzess, senna, Fleet enema, milk of magnesia, Dulcolax  Encourage adequate p o  hydration   Continue to monitor

## 2022-05-17 NOTE — ASSESSMENT & PLAN NOTE
Multifactorial next item wheelchair-bound at baseline   PT/OT for continued strength and balance training   Out of bed as tolerated  continue supportive care are short-term rehab with ADLs   Continue with fall and safety precautions   continue with pressure offloading while in bed next anchor adequate p o  hydration and nutrition  continue with supplements   PT/OT/Sl/DT/SW following

## 2022-05-17 NOTE — PROGRESS NOTES
Facility: [facility]  POS: STR 31  Progress Note    Chief Complaint/Reason for visit: STR  f/u    Patient's care was coordinated with nursing facility staff  Recent vitals, labs, and updated medications were review on [facility] system in facility       Assessment/Plan:    Ambulatory dysfunction  Multifactorial next item wheelchair-bound at baseline   PT/OT for continued strength and balance training   Out of bed as tolerated  continue supportive care are short-term rehab with ADLs   Continue with fall and safety precautions   continue with pressure offloading while in bed next anchor adequate p o  hydration and nutrition  continue with supplements   PT/OT/Sl/DT/SW following    Physical deconditioning  Multifactorial   With known history of cerebral palsy and COVID-19 infection- resolved   Continue PT/OT   Continue with fall and safety precaution   Continue with supportive care at short-term rehab with ADLs   Continue with pressure offloading as tolerated   Ensure adequate p o  hydration and nutrition   Patient is a complete feet continue to assist   Continue with supplement   Continue with weights    Hypothyroidism  Most recent TSH 1 45 on 12/20/2021   Will recheck TSH/ T4 level on June 2022   Continue levothyroxine 150 mcg daily  Monitor    Dysphagia  Remains on pureed diet with nectar thick liquids  Patient is a complete feed  Continue aspiration precaution per facility   Speech therapy Consul as needed    Chronic constipation  Currently stable with no reportable events   Per chart review has been having bowel movement every day, recent bowel movement on 05/10 at 845 in the morning   Continue current medication regimen of Linzess, senna, Fleet enema, milk of magnesia, Dulcolax  Encourage adequate p o  hydration   Continue to monitor    Essential hypertension  Blood pressure log reviewed and stable   Blood pressure today 130/74  SBPs 110 to 152 5/7-12  Continue with current medication regimen of amlodipine 2 5 mg daily and valsartan  240 mg daily  Continue low-salt diet  Will continue periodical lab work to monitor renal function   Continue to monitor blood pressure and adjust medication as needed    Cerebral palsy ([facility] )  With known history of cerebral palsy   Continue supportive care and treatment at SNF, now in STR  Continue PT/OT for continued strength and balance training   Continue with fall and safety precautions   PT/OT/SL/DT/SW following    History of Present Illness: HPI   Yoel Yoo is a 57-year old female resident of [address] [place] [address] and now in [place]  She is being seen today for her STR f/u on her acute and chronic medical conditions  Received patient out of bed in her chair, resting  Appeared comfortable and was in no acute distress  review of system is limited due to being nonverbal   Appears weak and frail  She is on assist with feeds  Per chart review appetite is at her baseline and she consumes around % for most of her meals  She sleeps well at night  Vital sign has been stable  No behaviors reported by nursing staff  Per nursing no other issues with cough, fever, diarrhea or constipation  Per nursing no other acute concerns or issues at this time  Past Medical History: unchanged from history and physical  Past Medical History:   Diagnosis Date    Arthritis     Blood pressure instability UNKNOWN    Bowel obstruction (HCC)     intestine    Bradycardia     Cerebral palsy (HCC)     Chronic edema     Closed fracture of coccyx (HCC)     DVT (deep venous thrombosis) ([facility] [address] [facility] )     US Doppler done 10/2015 and 3/2016    GERD (gastroesophageal reflux disease)     History of Doppler ultrasound     of UE and LE; 10/2015 and 3/2016 DVT    History of echocardiogram 03/2016    EF 55%  Trace mitral and tricuspid regurgitation  PA systolic pressure 33  Echo Doppler 2/2017- EF 60%   History of EKG 10/20/2016    Normal sinus rhythm with sinus arrhythmia   Abnormal ECG     History of Holter monitoring 11/2017    14 DAY MONITOR Baseline rhythm was of sinus origin with rare APCs and VPCs and a few baseline motion artifact  NO OTHER ARRHYTHMIAS  No symptoms was reported in the attached diary   History of Holter monitoring 10/2016    Sinus rhythm  There were a few episodes of asymptomatic sinus arrhythmia  There was sinus bradycardia at rate as low as 47 bpm  There were rare single PACs and PVCs   Hypertension     Hyperthyroidism     Intellectual disability     Kidney stone     Lymphedema     Pneumonia     Stroke ([address] )     UNCERTAIN OF TYPE/NON VERBAL    Tachycardia     Thyroid disease     Urinary incontinence     Urinary tract infection      Family History: unchanged from history and physical  Social History: unchanged from history and physical  Resident Since:   Review of systems: Review of Systems   Reason unable to perform ROS: non verbal      Medications: All medication and routine orders were reviewed and updated  Allergies: Reviewed and unchanged  Consults reviewed:PT, OT, Speech and Nutrition  Labs/Diagnostics (reviewed by this provider): Copy in Chart    Imaging Reviewed:    Physical Exam    Weight: 118 0 lbs Temp:97 9 BP:130/74   Pulse:80   Resp:20 O2 Sat:97% RA  Constitutional: Cachetic  Orientation:Person     Physical Exam  Constitutional:       General: She is not in acute distress  Appearance: She is not ill-appearing  Comments: Chronically ill-appearing   HENT:      Head: Normocephalic and atraumatic  Nose: No rhinorrhea  Mouth/Throat:      Mouth: Mucous membranes are moist    Eyes:      General:         Right eye: No discharge  Left eye: Discharge present  Cardiovascular:      Rate and Rhythm: Normal rate and regular rhythm  Pulses: Normal pulses  Heart sounds: Normal heart sounds     Pulmonary:      Effort: Pulmonary effort is normal       Comments: B/L Diminished  Abdominal:      General: Bowel sounds are normal  There is no distension  Palpations: Abdomen is soft  Tenderness: There is no abdominal tenderness  There is no guarding  Genitourinary:     Comments: Incontinent of bowel and bladder  Musculoskeletal:         General: Deformity present  Cervical back: Normal range of motion and neck supple  Right lower leg: No edema  Left lower leg: No edema  Comments: UE/LE contractures   Skin:     General: Skin is warm  Coloration: Skin is not jaundiced  Findings: No erythema  Neurological:      Mental Status: She is disoriented  Cranial Nerves: No cranial nerve deficit  Motor: Weakness (generalized) present  Gait: Gait abnormal       Comments: Nonverbal, h/o CP     This note was completed in part utilizing [employer] direct voice recognition software  Grammatical errors, random word insertion, spelling mistakes, and incomplete sentences may be an occasional consequence of the system secondary to software limitations, ambient noise and hardware issues  At the time of dictation, efforts were made to edit, clarify and/or correct errors  Please read the chart carefully and recognize, using context, where substitutions have occurred  If you have any questions or concerns about the context, text or information contained within the body of this dictation, please contact myself, the provider, for further clarification      JONATHAN Torres  2/79/87174:34 PM

## 2022-05-17 NOTE — ASSESSMENT & PLAN NOTE
With known history of cerebral palsy   Continue supportive care and treatment at SNF, now in STR  Continue PT/OT for continued strength and balance training   Continue with fall and safety precautions   PT/OT/SL/DT/SW following

## 2022-05-17 NOTE — ASSESSMENT & PLAN NOTE
Most recent TSH 1 45 on 12/20/2021   Will recheck TSH/ T4 level on June 2022   Continue levothyroxine 150 mcg daily  Monitor

## 2022-06-09 ENCOUNTER — NURSING HOME VISIT (OUTPATIENT)
Dept: GERIATRICS | Facility: OTHER | Age: 57
End: 2022-06-09
Payer: MEDICARE

## 2022-06-09 VITALS
DIASTOLIC BLOOD PRESSURE: 64 MMHG | TEMPERATURE: 97.9 F | OXYGEN SATURATION: 97 % | SYSTOLIC BLOOD PRESSURE: 116 MMHG | HEART RATE: 82 BPM | RESPIRATION RATE: 18 BRPM | WEIGHT: 118 LBS | BODY MASS INDEX: 23.05 KG/M2

## 2022-06-09 DIAGNOSIS — E03.9 HYPOTHYROIDISM, UNSPECIFIED TYPE: Primary | ICD-10-CM

## 2022-06-09 PROCEDURE — 99309 SBSQ NF CARE MODERATE MDM 30: CPT | Performed by: INTERNAL MEDICINE

## 2022-06-09 NOTE — ASSESSMENT & PLAN NOTE
Recent TSH was low at 0 08 with normal free T4  Levothyroxine dose was reduced from 150-137 mcg daily    Repeat TSH in 4 weeks

## 2022-06-09 NOTE — PROGRESS NOTES
[address]  [address]   [address] [facility] Liam Bragg [address]  48     Progress Note  Code [facility] 32  Patient Location     New [facility] rehab    Reason for visit     Follow-up cerebral Passy, dysphagia, essential hypertension, ambulatory dysfunction, hypothyroidism    Patients care was coordinated with nursing facility staff  Recent vitals, labs and updated medications [facility]  Problem List Items Addressed This Visit        Endocrine    Hypothyroidism - Primary     Recent TSH was low at 0 08 with normal free T4  Levothyroxine dose was reduced from 150-137 mcg daily  Repeat TSH in 4 weeks                 Cerebral Palsy:  Continue supportive treatment    HTN:  Blood pressure stable on amlodipine 2 5 mg daily and valsartan to 40 mg daily    Vitamin-D deficiency:  Continue vitamin-D supplement    Constipation:  Stable on Linzess 145 mcg daily and Senokot    GERD:  Stable on lansoprazole    Chronic pain:  Patient remains on meloxicam 7 5 mg daily on long-term basis    HPI       Patient is being seen for a follow-up visit today  She is doing okay at present  Patient is unable to provide any history due to cerebral palsy  No active issues have been reported by nursing staff  Patient has not had any fever chills dyspnea or chest congestion      Review of Systems   Constitutional: Negative for chills and fever  Respiratory: Negative for shortness of breath, wheezing and stridor  Gastrointestinal: Negative for abdominal distention and vomiting  Musculoskeletal: Positive for gait problem  Neurological: Positive for weakness  Negative for seizures and syncope  Psychiatric/Behavioral: Positive for confusion  Negative for agitation         Past Medical History:   Diagnosis Date    Arthritis     Blood pressure instability UNKNOWN    Bowel obstruction (HCC)     intestine    Bradycardia     Cerebral palsy (HCC)     Chronic edema     Closed fracture of coccyx ([facility] [address] )     [facility]     US Doppler done 10/2015 and 3/2016    GERD (gastroesophageal reflux disease)     History of Doppler ultrasound     of UE and LE; 10/2015 and 3/2016 DVT    History of echocardiogram 03/2016    EF 55%  Trace mitral and tricuspid regurgitation  PA systolic pressure 33  Echo Doppler 2/2017- EF 60%   History of EKG 10/20/2016    Normal sinus rhythm with sinus arrhythmia  Abnormal ECG     History of Holter monitoring 11/2017    14 DAY MONITOR Baseline rhythm was of sinus origin with rare APCs and VPCs and a few baseline motion artifact  NO OTHER ARRHYTHMIAS  No symptoms was reported in the attached diary   History of Holter monitoring 10/2016    Sinus rhythm  There were a few episodes of asymptomatic sinus arrhythmia  There was sinus bradycardia at rate as low as 47 bpm  There were rare single PACs and PVCs       Hypertension     Hyperthyroidism     Intellectual disability     Kidney stone     Lymphedema     Pneumonia     St[facility]     UNCERTAIN OF TYPE/NON VERBAL    Tachycardia     Thyroid disease     Urinary incontinence     Urinary tract infection        Past Surgical History:   Procedure Laterality Date    ABDOMINAL HYSTERECTOMY      COLONOSCOPY      EYE SURGERY      HYSTERECTOMY      HYSTERECTOMY      IR DRAINAGE TUBE PLACEMENT  6/12/2020    SMALL INTESTINE SURGERY      US GUIDED VASCULAR ACCESS  1/18/2017       Social History     Tobacco Use   Smoking Status Never Smoker   Smokeless Tobacco Never Used       Family History   Problem Relation Age of Onset    Cancer Father     Colon cancer Father     Asthma Mother     Stroke Mother     WESTLEY disease Mother     Arthritis Mother     Diabetes type II Mother     Mitral valve prolapse Mother     Irritable bowel syndrome Mother     Heart disease Mother         Allergies   Allergen Reactions    Bethanechol      Reaction Date: 10Dec2007; SEIZURES  Metoclopramide      Reaction Date: 10Dec2007; SEIZURES      Sulfamethoxazole-Trimethoprim      Reaction Date: 10Dec2007; SEIZURES           Current Outpatient Medications:     acetaminophen (TYLENOL) 325 mg tablet, Take 650 mg by mouth 2 (two) times a day, Disp: , Rfl:     amLODIPine (NORVASC) 2 5 mg tablet, Take 2 5 mg by mouth daily GIVE FOR SBP >130, Disp: , Rfl:     aspirin 81 mg chewable tablet, Chew 1 tablet (81 mg total) daily for 7 days, Disp: 7 tablet, Rfl: 0    Balsam [place] Oil (Venelex) OINT, Apply 1 application topically 2 (two) times a day for 7 days  - apply to buttocks in AM + PM until resolved, Disp: 30 g, Rfl: 1    bisacodyl (DULCOLAX) 10 mg suppository, Insert 10 mg into the rectum daily as needed, Disp: , Rfl:     cholecalciferol (VITAMIN D3) 1,000 units tablet, Take 1,000 Units by mouth daily, Disp: , Rfl:     DULoxetine (CYMBALTA) 30 mg delayed release capsule, Take 30 mg by mouth daily FOR FIBROMYALGIA, Disp: , Rfl:     Garlic 7495 MG CAPS, Take 1 capsule by mouth daily, Disp: , Rfl:     lansoprazole (PREVACID) 30 mg capsule, TAKE 1 CAPSULE BY MOUTH EVERY DAY, Disp: 90 capsule, Rfl: 3    levothyroxine 150 mcg tablet, Take 1 tablet (150 mcg total) by mouth daily, Disp: 90 tablet, Rfl: 1    Linzess 145 MCG CAPS, TAKE 1 CAPSULE BY MOUTH EVERY DAY, Disp: 90 capsule, Rfl: 1    magnesium hydroxide (MILK OF MAGNESIA) 400 mg/5 mL oral suspension, Take 30 mL by mouth daily as needed, Disp: , Rfl:     meloxicam (MOBIC) 7 5 mg tablet, Take 1 tablet (7 5 mg total) by mouth daily for 7 days, Disp: 7 tablet, Rfl: 0    senna (SENOKOT) 8 6 MG tablet, Take 1 tablet (8 6 mg total) by mouth daily for 7 days, Disp: 7 tablet, Rfl: 0    Sodium Phosphates (FLEET ENEMA RE), Insert 1 Bottle into the rectum daily as needed, Disp: , Rfl:     valsartan (DIOVAN) 320 MG tablet, Take 240 mg by mouth daily PATIENT IS CURRENTLY  MG DAILY AT [facility] , Disp: , Rfl:     Updated list was reviewed in [facility]  Vitals:    06/09/22 1551   BP: 116/64   Pulse: 82   Resp: 18   Temp: 97 9 °F (36 6 °C)   SpO2: 97%       Physical Exam  HENT:      Head: Normocephalic and atraumatic  Eyes:      General:         Right eye: No discharge  Left eye: No discharge  Cardiovascular:      Rate and Rhythm: Normal rate and regular rhythm  Pulmonary:      Breath sounds: No wheezing, rhonchi or rales  Abdominal:      General: There is no distension  Palpations: Abdomen is soft  Tenderness: There is no abdominal tenderness  There is no guarding  Musculoskeletal:      Cervical back: Neck supple  Right lower leg: No edema  Left lower leg: No edema  Comments: Mild edema over dorsum of feet   Skin:     Coloration: Skin is not jaundiced  Neurological:      Mental Status: She is disoriented  Cranial Nerves: No cranial nerve deficit  Motor: Weakness (chronic weaknes of extremities/ hands related to CP) present  Psychiatric:         Mood and Affect: Mood normal        Diagnostic Data:  Recent labs were reviewed  Labs done on 05/19/2022 revealed hemoglobin of 10 1, WBC count 4 4, platelet count 496  BUN 23, creatinine 0 65, sodium 141, potassium 5 1  TSH was low at 0 08    Free T4 was normal at 1 12      This note was electronically signed by Dr Christian Brewster

## 2022-07-01 ENCOUNTER — NURSING HOME VISIT (OUTPATIENT)
Dept: GERIATRICS | Facility: OTHER | Age: 57
End: 2022-07-01
Payer: MEDICARE

## 2022-07-01 DIAGNOSIS — E03.9 HYPOTHYROIDISM, UNSPECIFIED TYPE: Primary | ICD-10-CM

## 2022-07-01 PROCEDURE — 99308 SBSQ NF CARE LOW MDM 20: CPT | Performed by: INTERNAL MEDICINE

## 2022-07-04 NOTE — PROGRESS NOTES
[address]  [address]   [address] [facility] [facility] [address] [facility] [address]  48     Progress Note  Code [facility] 32    Patient Location     [facility] rehab    Reason for visit     F U GERD, HTN, Hypothyroidism, Cerebral palsy    Patients care was coordinated with nursing facility staff  Recent vitals, labs and updated medications were reviewed on [employer] system of facility  Problem List Items Addressed This Visit        Endocrine    Hypothyroidism - Primary     TSH was low at  08 in May/22  Levothyroxine dose was adjusted  Repeat Tsh is due soon  Will follow           Relevant Medications    Levothyroxine Sodium 112 MCG CAPS          Cerebral Palsy:  Continue supportive treatment     HTN:  Blood pressure stable on amlodipine 2 5 mg daily and valsartan to 40 mg daily     Vitamin-D deficiency:  Continue vitamin-D supplement     Constipation:  Stable on Linzess 145 mcg daily and Senokot     GERD:  Stable on lansoprazole     Chronic pain:  Patient remains on meloxicam 7 5 mg daily on long-term basis    HPI       Pt is being seen for a f u visit  Doing ok  Appears comfortable in no distress, unable to provide any history due to cerebral palsy  No active issues have een reported by the staff    Review of Systems   Unable to perform ROS: Other   Constitutional: Negative for chills and fever  Respiratory: Negative for shortness of breath, wheezing and stridor  Gastrointestinal: Negative for abdominal distention and vomiting  Genitourinary: Negative for hematuria  Musculoskeletal: Positive for gait problem  Psychiatric/Behavioral: Positive for confusion         Past Medical History:   Diagnosis Date    Arthritis     Blood pressure instability UNKNOWN    Bowel obstruction (HCC)     intestine    Bradycardia     Cerebral palsy (HCC)     Chronic edema     Closed fracture of coccyx (HCC)     DVT (deep [facility] thrombosis) ([facility] [address] )     US Doppler done 10/2015 and 3/2016    GERD (gastroesophageal reflux disease)     History of Doppler ultrasound     of UE and LE; 10/2015 and 3/2016 DVT    History of echocardiogram 03/2016    EF 55%  Trace mitral and tricuspid regurgitation  PA systolic pressure 33  Echo Doppler 2/2017- EF 60%   History of EKG 10/20/2016    Normal sinus rhythm with sinus arrhythmia  Abnormal ECG     History of Holter monitoring 11/2017    14 DAY MONITOR Baseline rhythm was of sinus origin with rare APCs and VPCs and a few baseline motion artifact  NO OTHER ARRHYTHMIAS  No symptoms was reported in the attached diary   History of Holter monitoring 10/2016    Sinus rhythm  There were a few episodes of asymptomatic sinus arrhythmia  There was sinus bradycardia at rate as low as 47 bpm  There were rare single PACs and PVCs       Hypertension     Hyperthyroidism     Intellectual disability     Kidney stone     Lymphedema     Pneumonia     Stroke ([facility] [address] )     UNCERTAIN OF TYPE/NON VERBAL    Tachycardia     Thyroid disease     Urinary incontinence     Urinary tract infection        Past Surgical History:   Procedure Laterality Date    ABDOMINAL HYSTERECTOMY      COLONOSCOPY      EYE SURGERY      HYSTERECTOMY      HYSTERECTOMY      IR DRAINAGE TUBE PLACEMENT  6/12/2020    SMALL INTESTINE SURGERY      US GUIDED VASCULAR ACCESS  1/18/2017       Social History     Tobacco Use   Smoking Status Never Smoker   Smokeless Tobacco Never Used       Family History   Problem Relation Age of Onset    Cancer Father     Colon cancer Father     Asthma Mother     Stroke Mother     WESTLEY disease Mother     Arthritis Mother     Diabetes type II Mother     Mitral valve prolapse Mother     Irritable bowel syndrome Mother     Heart disease Mother         Allergies   Allergen Reactions    Bethanechol      Reaction Date: 10Dec2007; SEIZURES      Metoclopramide      Reaction Date: 10Dec2007; SEIZURES      Sulfamethoxazole-Trimethoprim      Reaction Date: 10Dec2007; SEIZURES           Current Outpatient Medications:     acetaminophen (TYLENOL) 325 mg tablet, Take 650 mg by mouth 2 (two) times a day, Disp: , Rfl:     amLODIPine (NORVASC) 2 5 mg tablet, Take 2 5 mg by mouth daily GIVE FOR SBP >130, Disp: , Rfl:     aspirin 81 mg chewable tablet, Chew 1 tablet (81 mg total) daily for 7 days, Disp: 7 tablet, Rfl: 0    Balsam [place] Oil (Venelex) OINT, Apply 1 application topically 2 (two) times a day for 7 days  - apply to buttocks in AM + PM until resolved, Disp: 30 g, Rfl: 1    bisacodyl (DULCOLAX) 10 mg suppository, Insert 10 mg into the rectum daily as needed, Disp: , Rfl:     cholecalciferol (VITAMIN D3) 1,000 units tablet, Take 1,000 Units by mouth daily, Disp: , Rfl:     DULoxetine (CYMBALTA) 30 mg delayed release capsule, Take 30 mg by mouth daily FOR FIBROMYALGIA, Disp: , Rfl:     Garlic 1934 MG CAPS, Take 1 capsule by mouth daily, Disp: , Rfl:     lansoprazole (PREVACID) 30 mg capsule, TAKE 1 CAPSULE BY MOUTH EVERY DAY, Disp: 90 capsule, Rfl: 3    levothyroxine 150 mcg tablet, Take 1 tablet (150 mcg total) by mouth daily, Disp: 90 tablet, Rfl: 1    Linzess 145 MCG CAPS, TAKE 1 CAPSULE BY MOUTH EVERY DAY, Disp: 90 capsule, Rfl: 1    magnesium hydroxide (MILK OF MAGNESIA) 400 mg/5 mL oral suspension, Take 30 mL by mouth daily as needed, Disp: , Rfl:     meloxicam (MOBIC) 7 5 mg tablet, Take 1 tablet (7 5 mg total) by mouth daily for 7 days, Disp: 7 tablet, Rfl: 0    senna (SENOKOT) 8 6 MG tablet, Take 1 tablet (8 6 mg total) by mouth daily for 7 days, Disp: 7 tablet, Rfl: 0    Sodium Phosphates (FLEET ENEMA RE), Insert 1 Bottle into the rectum daily as needed, Disp: , Rfl:     valsartan (DIOVAN) 320 MG tablet, Take 240 mg by mouth daily PATIENT IS CURRENTLY  MG DAILY AT [facility] , Disp: , Rfl:     Updated list was reviewed in [facility] of facility     /74, HR72, RR 18, Sao2 98 % on RA    Physical Exam  Constitutional: General: She is not in acute distress  HENT:      Head: Normocephalic and atraumatic  Eyes:      General: No scleral icterus  Right eye: No discharge  Left eye: No discharge  Cardiovascular:      Rate and Rhythm: Normal rate and regular rhythm  Pulmonary:      Breath sounds: No wheezing or rales  Abdominal:      General: There is no distension  Palpations: Abdomen is soft  Tenderness: There is no abdominal tenderness  Musculoskeletal:      Cervical back: Neck supple  Comments: Chronic edema overlying dorsum of feet b/l   Skin:     Coloration: Skin is not jaundiced  Neurological:      Mental Status: She is disoriented  Cranial Nerves: No cranial nerve deficit  Motor: Weakness (Chronic weakness, contractures of extremities/ hands related to CP) present  Diagnostic Data:    Recent labs were reviewed    Labs from 5/19 reviewed  TSH low at  08      This note was electronically signed by Dr Rosa Elena Jimenes

## 2022-07-11 RX ORDER — LEVOTHYROXINE SODIUM 112 UG/1
CAPSULE ORAL DAILY
COMMUNITY

## 2022-08-09 ENCOUNTER — NURSING HOME VISIT (OUTPATIENT)
Dept: GERIATRICS | Facility: OTHER | Age: 57
End: 2022-08-09
Payer: MEDICARE

## 2022-08-09 DIAGNOSIS — G80.9 CEREBRAL PALSY, UNSPECIFIED TYPE (HCC): Primary | ICD-10-CM

## 2022-08-09 DIAGNOSIS — M79.7 FIBROMYALGIA: ICD-10-CM

## 2022-08-09 DIAGNOSIS — I10 ESSENTIAL HYPERTENSION: ICD-10-CM

## 2022-08-09 DIAGNOSIS — R13.10 DYSPHAGIA, UNSPECIFIED TYPE: ICD-10-CM

## 2022-08-09 DIAGNOSIS — K59.09 CHRONIC CONSTIPATION: ICD-10-CM

## 2022-08-09 DIAGNOSIS — K21.9 GASTROESOPHAGEAL REFLUX DISEASE, UNSPECIFIED WHETHER ESOPHAGITIS PRESENT: ICD-10-CM

## 2022-08-09 DIAGNOSIS — R53.81 PHYSICAL DECONDITIONING: ICD-10-CM

## 2022-08-09 DIAGNOSIS — M19.90 ARTHRITIS: ICD-10-CM

## 2022-08-09 DIAGNOSIS — E03.9 HYPOTHYROIDISM, UNSPECIFIED TYPE: ICD-10-CM

## 2022-08-09 PROCEDURE — 99309 SBSQ NF CARE MODERATE MDM 30: CPT

## 2022-08-26 VITALS
SYSTOLIC BLOOD PRESSURE: 129 MMHG | TEMPERATURE: 98.1 F | DIASTOLIC BLOOD PRESSURE: 72 MMHG | OXYGEN SATURATION: 99 % | HEART RATE: 80 BPM | RESPIRATION RATE: 18 BRPM

## 2022-08-27 NOTE — ASSESSMENT & PLAN NOTE
· Patient does not appear to be in pain on exam today   · Continue current medication regimen   · Duloxetine 30 mg daily   · Tylenol 650 mg daily   · Continue supportive care

## 2022-08-27 NOTE — ASSESSMENT & PLAN NOTE
· Patient with known history   · Currently remains on pureed diet with nectar thick liquids  · Patient is tolerating current diet   · Dependent for feeding    · Maintain aspiration precautions   · Speech therapy as needed

## 2022-08-27 NOTE — PROGRESS NOTES
[facility]  [address]  [phone]  Dhaval Man  Code 32 ([facility])        NAME: Iwona Lorenz  AGE: 61 y o  SEX: female CODE STATUS: Full Code    DATE OF ENCOUNTER: 8/9/2022    Assessment and Plan     1  Cerebral palsy, unspecified type [facility])  Assessment & Plan:  · Patient with known history   · Is currently dependent for all cares   · Not currently receiving therapy services at this time  · Continue 24/7 supportive care       2  Essential hypertension  Assessment & Plan:  · Blood pressure log reviewed and pressures have been stable (7/9 to present)  · Trending between 98/68 and 138/84  · SBP primarily trending between 110's and 120's   · Continue current medication regimen   · Valsartan 240 mg daily   · Amlodipine 2 5 mg daily  · Avoid hypotension   · Encourage low salt diet   · Continue to monitor       3  Hypothyroidism, unspecified type  Assessment & Plan:  · TSH in May 2022 was noted to be low at 0 08  · Levothyroxine dose was adjusted   · Most recent lab work on 8/18 revealed the following  · TSH: 0 43  · Free T4: 0 91  · Continue current medication regimen   · Levothyroxine 112 mcg daily       4  Gastroesophageal reflux disease, unspecified whether esophagitis present  Assessment & Plan:  · Continue current medication regimen  · Lansoprazole 30 mg daily       5  Fibromyalgia  Assessment & Plan:  · Patient does not appear to be in pain on exam today   · Continue current medication regimen   · Duloxetine 30 mg daily   · Tylenol 650 mg daily   · Continue supportive care      6  Dysphagia, unspecified type  Assessment & Plan:  · Patient with known history   · Currently remains on pureed diet with nectar thick liquids  · Patient is tolerating current diet   · Dependent for feeding    · Maintain aspiration precautions   · Speech therapy as needed      7   Chronic constipation  Assessment & Plan:  · Patient with known history   · Per SNF records, patient is having regular bowel movements  · Continue current medication regimen   · Linzess 145 mcg daily       8  Arthritis  Assessment & Plan:  · Patient with known history   · Does not appear to be uncomfortable or in pain on exam today   · Continue current medication regimen   · Meloxicam 7 5 mg daily   · Tylenol 650 mg Q 6 hours PRN   · Duloxetine 30 mg daily   · Continue supportive care       9  Physical deconditioning  Assessment & Plan:  · Multifactorial   · Patient with known history of cerebral palsy   · Has received PT and OT services intermittently   · Not currently receiving any therapy services   · Maintain fall and safety precautions  · Continue pressure offloading   · Encourage oral hydration and nutrition   · Continue 24/7 supportive care          All medications and routine orders were reviewed and updated as needed  Chief Complaint     LTC follow-up visit  History of Present Illness     Tennis Ngozi is a 61year old female who is a [facility] resident of [facility]  Her past medical history includes but is not limited to GERD, hypothyroidism, hypertension, cerebral palsy, and physical deconditioning  She is being seen and evaluated today in collaboration with nursing for medical management and LTC follow-up  The patient was seen and evaluated today at the bedside  She is noted to be lying in bed comfortably in no acute distress  She is alert and awake but is primarily non-verbal at baseline  She is not able to provide a meaningful history  She does not appear to be experiencing any pain  She is calm and smiling on exam  She is noted to be completely dependent for all cares  She is noted to be incontinent of both bowel and bladder  Per the SNF records, her last bowel movement was noted to be on 8/8  She appears to have a good appetite  Per SNF records, she is eating 3 meals per day, primarily consuming  % of each meal  There are no concerns from nursing at this time         The patient's allergies, past medical, surgical, social and family history were reviewed and unchanged  Review of Systems     Review of Systems   Unable to perform ROS: Patient nonverbal (Hx of cerebral palsy)   Neurological: Positive for weakness (generalized)  Psychiatric/Behavioral: Negative for agitation and behavioral problems  Objective     Vitals: Per SNF records     Vitals:    08/09/22 1817   BP: 129/72   Pulse: 80   Resp: 18   Temp: 98 1 °F (36 7 °C)   SpO2: 99%       Physical Exam  Vitals and nursing note reviewed  Constitutional:       General: She is not in acute distress  Appearance: Normal appearance  She is normal weight  She is not ill-appearing  HENT:      Head: Normocephalic  Mouth/Throat:      Mouth: Mucous membranes are dry  Eyes:      Conjunctiva/sclera: Conjunctivae normal    Cardiovascular:      Rate and Rhythm: Normal rate and regular rhythm  Pulses: Normal pulses  Heart sounds: Normal heart sounds  No murmur heard  Pulmonary:      Effort: Pulmonary effort is normal  No respiratory distress  Breath sounds: Decreased breath sounds present  No wheezing, rhonchi or rales  Abdominal:      General: Bowel sounds are normal  There is no distension  Palpations: Abdomen is soft  Genitourinary:     Comments: Incontinent of bowel and bladder  Musculoskeletal:         General: Deformity (bilateral upper and lower extremity contractures) present  No swelling  Skin:     General: Skin is warm and dry  Coloration: Skin is not pale  Findings: No bruising or erythema  Neurological:      Mental Status: She is alert  Mental status is at baseline  Motor: Weakness present        Gait: Gait abnormal       Comments: Unable to assess mentation   Patient primarily non-verbal secondary to cerebral palsy   Psychiatric:         Mood and Affect: Mood normal          Behavior: Behavior normal        Pertinent Laboratory/Diagnostic Studies:   Reviewed in facility chart-stable    Current Medications   Medications reviewed and updated see facility [facility] for details  Current Outpatient Medications:     acetaminophen (TYLENOL) 325 mg tablet, Take 650 mg by mouth 2 (two) times a day, Disp: , Rfl:     amLODIPine (NORVASC) 2 5 mg tablet, Take 2 5 mg by mouth daily GIVE FOR SBP >130, Disp: , Rfl:     aspirin 81 mg chewable tablet, Chew 1 tablet (81 mg total) daily for 7 days, Disp: 7 tablet, Rfl: 0    Balsam [place] Oil (Venelex) OINT, Apply 1 application topically 2 (two) times a day for 7 days  - apply to buttocks in AM + PM until resolved, Disp: 30 g, Rfl: 1    bisacodyl (DULCOLAX) 10 mg suppository, Insert 10 mg into the rectum daily as needed, Disp: , Rfl:     cholecalciferol (VITAMIN D3) 1,000 units tablet, Take 1,000 Units by mouth daily, Disp: , Rfl:     DULoxetine (CYMBALTA) 30 mg delayed release capsule, Take 30 mg by mouth daily FOR FIBROMYALGIA, Disp: , Rfl:     Garlic 0392 MG CAPS, Take 1 capsule by mouth daily, Disp: , Rfl:     lansoprazole (PREVACID) 30 mg capsule, TAKE 1 CAPSULE BY MOUTH EVERY DAY, Disp: 90 capsule, Rfl: 3    Levothyroxine Sodium 112 MCG CAPS, Take by mouth daily, Disp: , Rfl:     Linzess 145 MCG CAPS, TAKE 1 CAPSULE BY MOUTH EVERY DAY, Disp: 90 capsule, Rfl: 1    magnesium hydroxide (MILK OF MAGNESIA) 400 mg/5 mL oral suspension, Take 30 mL by mouth daily as needed, Disp: , Rfl:     meloxicam (MOBIC) 7 5 mg tablet, Take 1 tablet (7 5 mg total) by mouth daily for 7 days, Disp: 7 tablet, Rfl: 0    senna (SENOKOT) 8 6 MG tablet, Take 1 tablet (8 6 mg total) by mouth daily for 7 days, Disp: 7 tablet, Rfl: 0    Sodium Phosphates (FLEET ENEMA RE), Insert 1 Bottle into the rectum daily as needed, Disp: , Rfl:     valsartan (DIOVAN) 320 MG tablet, Take 240 mg by mouth daily PATIENT IS CURRENTLY  MG DAILY AT [facility] , Disp: , Rfl:      Plan discussed with Dr Kodi Mccracken noted agreement with assessment and plan        Please note: Voice-recognition software may have been used in the preparation of this document  Occasional wrong word or "sound-alike" substitutions may have occurred due to the inherent limitations of voice recognition software  Interpretation should be guided by JONATHAN Boyd  8/21/2022  8:46 PM

## 2022-08-27 NOTE — ASSESSMENT & PLAN NOTE
· Patient with known history   · Does not appear to be uncomfortable or in pain on exam today   · Continue current medication regimen   · Meloxicam 7 5 mg daily   · Tylenol 650 mg Q 6 hours PRN   · Duloxetine 30 mg daily   · Continue supportive care

## 2022-08-27 NOTE — ASSESSMENT & PLAN NOTE
· Blood pressure log reviewed and pressures have been stable (7/9 to present)  · Trending between 98/68 and 138/84  · SBP primarily trending between 110's and 120's   · Continue current medication regimen   · Valsartan 240 mg daily   · Amlodipine 2 5 mg daily  · Avoid hypotension   · Encourage low salt diet   · Continue to monitor

## 2022-08-27 NOTE — ASSESSMENT & PLAN NOTE
· Patient with known history   · Is currently dependent for all cares   · Not currently receiving therapy services at this time  · Continue 24/7 supportive care

## 2022-08-27 NOTE — ASSESSMENT & PLAN NOTE
· Patient with known history   · Per SNF records, patient is having regular bowel movements  · Continue current medication regimen   · Linzess 145 mcg daily

## 2022-08-27 NOTE — ASSESSMENT & PLAN NOTE
· TSH in May 2022 was noted to be low at 0 08  · Levothyroxine dose was adjusted   · Most recent lab work on 8/18 revealed the following  · TSH: 0 43  · Free T4: 0 91  · Continue current medication regimen   · Levothyroxine 112 mcg daily

## 2022-08-27 NOTE — ASSESSMENT & PLAN NOTE
· Multifactorial   · Patient with known history of cerebral palsy   · Has received PT and OT services intermittently   · Not currently receiving any therapy services   · Maintain fall and safety precautions  · Continue pressure offloading   · Encourage oral hydration and nutrition   · Continue 24/7 supportive care

## 2022-09-07 ENCOUNTER — NURSING HOME VISIT (OUTPATIENT)
Dept: GERIATRICS | Facility: OTHER | Age: 57
End: 2022-09-07
Payer: MEDICARE

## 2022-09-07 VITALS
BODY MASS INDEX: 26.95 KG/M2 | TEMPERATURE: 97.7 F | HEART RATE: 68 BPM | DIASTOLIC BLOOD PRESSURE: 72 MMHG | WEIGHT: 138 LBS | RESPIRATION RATE: 18 BRPM | OXYGEN SATURATION: 96 % | SYSTOLIC BLOOD PRESSURE: 121 MMHG

## 2022-09-07 DIAGNOSIS — E03.9 HYPOTHYROIDISM, UNSPECIFIED TYPE: Primary | ICD-10-CM

## 2022-09-07 PROCEDURE — 99309 SBSQ NF CARE MODERATE MDM 30: CPT | Performed by: INTERNAL MEDICINE

## 2022-09-07 NOTE — PROGRESS NOTES
[facility]  [address]   [address]     Progress Note  Code [facility] 32    Patient [place]     [facility]    Reason for visit     F U GERD, HTN, Hypothyroidism, Cerebral palsy      Problem List Items Addressed This Visit        Endocrine    Hypothyroidism - Primary     TSH done on 818 was normal at 0 43 with free T4 of 0 91  Continue levothyroxine 112 mcg daily  Hypertension:  Blood pressure stable on amlodipine 2 5 mg and valsartan to 240 mg daily    Cerebral Palsy:  Continue supportive treatment        Vitamin-D deficiency:  Continue vitamin-D supplement     Constipation:  Stable on Linzess 145 mcg daily and Senokot     GERD:  Stable on lansoprazole 30 mg daily     Chronic pain:  Patient has been on meloxicam 7 5 mg daily for chronic pain  Additionally remains on Tylenol 650 mg b i d     Will discuss with family regarding discontinuing meloxicam   Patient does not appear to have any acute issues with pain at this time  HPI       Patient is being seen for a follow-up visit today  Appears comfortable in no distress  Patient is unable to provide any meaningful history due to cerebral palsy  There have been no reports of any fever chills dyspnea or chest congestion  Vitals are stable  No behavioral issues  Review of Systems   Unable to perform ROS: Other   Constitutional: Negative for chills and fever  HENT: Negative for facial swelling  Eyes: Negative for redness  Respiratory: Negative for shortness of breath, wheezing and stridor  Gastrointestinal: Negative for abdominal distention and vomiting  Genitourinary: Negative for hematuria  Musculoskeletal: Positive for gait problem  Psychiatric/Behavioral: Positive for confusion         Past Medical History:   Diagnosis Date    Arthritis     Blood pressure instability UNKNOWN    Bowel obstruction (HCC)     intestine    Bradycardia     Cerebral palsy ([facility] )     Chronic edema     [facility] of [facility]     DVT (deep venous thrombosis) ([facility] )     US Doppler done 10/2015 and 3/2016    GERD (gastroesophageal reflux disease)     History of Doppler ultrasound     of UE and LE; 10/2015 and 3/2016 DVT    History of echocardiogram 03/2016    EF 55%  Trace mitral and tricuspid regurgitation  PA systolic pressure 33  Echo Doppler 2/2017- EF 60%   History of EKG 10/20/2016    Normal sinus rhythm with sinus arrhythmia  Abnormal ECG     History of Holter monitoring 11/2017    14 DAY MONITOR Baseline rhythm was of sinus origin with rare APCs and VPCs and a few baseline motion artifact  NO OTHER ARRHYTHMIAS  No symptoms was reported in the attached diary   History of Holter monitoring 10/2016    Sinus rhythm  There were a few episodes of asymptomatic sinus arrhythmia  There was sinus bradycardia at rate as low as 47 bpm  There were rare single PACs and PVCs       Hypertension     Hyperthyroidism     Intellectual disability     Kidney stone     Lymphedema     Pneumonia     Stroke ([facility] )     UNCERTAIN OF TYPE/NON VERBAL    Tachycardia     Thyroid disease     Urinary incontinence     Urinary tract infection        Past Surgical History:   Procedure Laterality Date    ABDOMINAL HYSTERECTOMY      COLONOSCOPY      EYE SURGERY      HYSTERECTOMY      HYSTERECTOMY      IR DRAINAGE TUBE PLACEMENT  6/12/2020    SMALL INTESTINE SURGERY      US GUIDED VASCULAR ACCESS  1/18/2017       Social History     Tobacco Use   Smoking Status Never Smoker   Smokeless Tobacco Never Used       Family History   Problem Relation Age of Onset    Cancer Father     Colon cancer Father     Asthma Mother     Stroke Mother     WESTLEY disease Mother     Arthritis Mother     Diabetes type II Mother     Mitral valve prolapse Mother     Irritable bowel syndrome Mother     Heart disease Mother         Allergies   Allergen Reactions    Bethanechol      Reaction Date: [id number]; SEIZURES      Metoclopramide      Reaction Date: 10Dec2007; SEIZURES      Sulfamethoxazole-Trimethoprim      Reaction Date: 10Dec2007; SEIZURES           Current Outpatient Medications:     acetaminophen (TYLENOL) 325 mg tablet, Take 650 mg by mouth 2 (two) times a day, Disp: , Rfl:     amLODIPine (NORVASC) 2 5 mg tablet, Take 2 5 mg by mouth daily GIVE FOR SBP >130, Disp: , Rfl:     aspirin 81 mg chewable tablet, Chew 1 tablet (81 mg total) daily for 7 days, Disp: 7 tablet, Rfl: 0    Balsam [place] Oil (Venelex) OINT, Apply 1 application topically 2 (two) times a day for 7 days  - apply to buttocks in AM + PM until resolved, Disp: 30 g, Rfl: 1    bisacodyl (DULCOLAX) 10 mg suppository, Insert 10 mg into the rectum daily as needed, Disp: , Rfl:     cholecalciferol (VITAMIN D3) 1,000 units tablet, Take 1,000 Units by mouth daily, Disp: , Rfl:     DULoxetine (CYMBALTA) 30 mg delayed release capsule, Take 30 mg by mouth daily FOR FIBROMYALGIA, Disp: , Rfl:     Garlic 5442 MG CAPS, Take 1 capsule by mouth daily, Disp: , Rfl:     lansoprazole (PREVACID) 30 mg capsule, TAKE 1 CAPSULE BY MOUTH EVERY DAY, Disp: 90 capsule, Rfl: 3    Levothyroxine Sodium 112 MCG CAPS, Take by mouth daily, Disp: , Rfl:     Linzess 145 MCG CAPS, TAKE 1 CAPSULE BY MOUTH EVERY DAY, Disp: 90 capsule, Rfl: 1    magnesium hydroxide (MILK OF MAGNESIA) 400 mg/5 mL oral suspension, Take 30 mL by mouth daily as needed, Disp: , Rfl:     meloxicam (MOBIC) 7 5 mg tablet, Take 1 tablet (7 5 mg total) by mouth daily for 7 days, Disp: 7 tablet, Rfl: 0    senna (SENOKOT) 8 6 MG tablet, Take 1 tablet (8 6 mg total) by mouth daily for 7 days, Disp: 7 tablet, Rfl: 0    Sodium Phosphates (FLEET ENEMA RE), Insert 1 Bottle into the rectum daily as needed, Disp: , Rfl:     valsartan (DIOVAN) 320 MG tablet, Take 240 mg by mouth daily PATIENT IS CURRENTLY  MG DAILY AT [facility] , Disp: , Rfl:     Updated list was reviewed in [facility] system of facility  /74, HR72, RR 18, Sao2 98 % on RA    Physical Exam  Constitutional:       General: She is not in acute distress  HENT:      Head: Normocephalic and atraumatic  Eyes:      General: No scleral icterus  Right eye: No discharge  Left eye: No discharge  Cardiovascular:      Rate and Rhythm: Normal rate and regular rhythm  Pulmonary:      Breath sounds: No wheezing or rales  Abdominal:      General: There is no distension  Palpations: Abdomen is soft  Tenderness: There is no abdominal tenderness  Musculoskeletal:         General: Deformity (Contractures of knees and upper extremities) present  Cervical back: Neck supple  Comments: Chronic edema involving dorsal aspect of feet bilaterally   Skin:     Coloration: Skin is not jaundiced  Neurological:      Mental Status: She is disoriented  Cranial Nerves: No cranial nerve deficit  Motor: Weakness (Chronic weakness, contractures of extremities/ hands related to CP) present           Diagnostic Data:    TSH was 0 43 with free T4 of 0 91 on 08/18  CBC BMP on 05/19/2022 revealed hemoglobin of 10 1, WBC count 4 4, platelet count 481  BUN 23, creatinine 0 65, sodium 141, potassium 5 1      This note was electronically signed by Dr Serena Calhoun

## 2022-09-30 ENCOUNTER — NURSING HOME VISIT (OUTPATIENT)
Dept: GERIATRICS | Facility: OTHER | Age: 57
End: 2022-09-30
Payer: MEDICARE

## 2022-09-30 DIAGNOSIS — R79.89 ELEVATED SERUM CREATININE: ICD-10-CM

## 2022-09-30 DIAGNOSIS — R79.9 ELEVATED BUN: Primary | ICD-10-CM

## 2022-09-30 DIAGNOSIS — R26.2 AMBULATORY DYSFUNCTION: ICD-10-CM

## 2022-09-30 DIAGNOSIS — R53.81 PHYSICAL DECONDITIONING: ICD-10-CM

## 2022-09-30 PROBLEM — R23.2 FACIAL FLUSHING: Status: ACTIVE | Noted: 2022-09-30

## 2022-09-30 PROCEDURE — 99309 SBSQ NF CARE MODERATE MDM 30: CPT | Performed by: NURSE PRACTITIONER

## 2022-10-01 NOTE — ASSESSMENT & PLAN NOTE
Multifactorial   Patient with known history of cerebral palsy  Noted UE/LE contractures and rigidity  Has received PT and OT services intermittently, currently not receiving  Continue to implement fall and safety precaution   Continue with pressure offloading and assist  Encourage adequate oral hydration and nutrition, continue with daily multivitamins and supplements   Continue with 24-7 supportive

## 2022-10-01 NOTE — PROGRESS NOTES
[facility]  POS LT 32  Progress Note    Assessment/Plan:     Elevated BUN  Suspect decreased p o  intake  Continue to encourage adequate p o  hydration, increase fluids with meals   Bladder scan to monitor for urinary retention   Dependent for ADLs/IADLs  Monitor    Facial flushing  Nursing reporting facial flushing  Per Day shift- nurse and staff, reporting, patient's mom recently had surgery  And no able to visit patient this week  Per nursing, patient behaves this way when mom is not around  Patient is afebrile, vital signs stable and nontoxic   Instructed nurse to monitor vital sign and if a change of mental status to swabbed patient for COVID-19   Since patient is free of fever and vital signs remained stable will monitor for now  No /GI discomfort per nurse  Last BM 9/29/22  Monitor closely     Elevated serum creatinine  Slightly elevated  Suspect due to decreased p o  intake  Continue to encourage adequate p o  hydration   P r n  bladder scan to monitor for urine retention  Will monitor renal function periodically  BMP in 10 days    Ambulatory dysfunction  PT/OT as needed per facility   Continue fall and safety precaution   Dependent for ADLs/IADLs  Continue with supportive care   PT/OT/SL/SW/DT/ following    Physical deconditioning  Multifactorial   Patient with known history of cerebral palsy  Noted UE/LE contractures and rigidity  Has received PT and OT services intermittently, currently not receiving  Continue to implement fall and safety precaution   Continue with pressure offloading and assist  Encourage adequate oral hydration and nutrition, continue with daily multivitamins and supplements   Continue with 24-7 supportive    Subjective: "evening nurse reporting patient is with facial flushed and not herself "      Patient ID: Bruna Contreras is a 61 y o  female  HPI   Shahnaz Almeida is a LTC resident of [facility]  Seen for acute visit  Received patient lying in bed, resting   Poor historian and unable to express her needs  Per evening nurse, patient appears flushed and not herself  Nurse reporting vital signs are stable, and afebrile  Day shift- nurse and staff, reporting, patient's mom recently had surgery  And no able to visit patient this week  Per nursing, patient behaves this way when mom is not around  Instruct nurse to monitor vital signs and if patient has a change of mental status, to swabbed for COVID  Reyes Drier aware  And will monitor since patient vital signs are stable and free of fever  Labs work up WBC unremarkable  BUN and creatinine slightly increased  Encourage adequate p o  hydration  Patient is awake and alert  Lungs are clear with no wheezes, abdomen soft, NT/ND +BS  Last bowel movement 9/29/22  Per nurse no  or GI discomfort  Per nursing no other concerns or issues at this time  Review of Systems   Unable to perform ROS: Dementia   Constitutional: Negative for chills, fatigue and fever  Respiratory: Negative for cough, wheezing and stridor  Cardiovascular: Negative for leg swelling  Musculoskeletal: Positive for gait problem  Skin: Positive for color change (flushed )  Negative for pallor  Neurological: Positive for weakness  Negative for tremors  Objective:  Blood pressure 129/84, pulse 84, respiration 18, temperature 97 6, O2 sats 96 percent room air, weight 138  2 pounds    CBC-BMP review on 09/30/2022   Blood glucose 122, BUN 42, creatinine 1 12, sodium 145, potassium 4 5, calcium 10 2, EGFR 57, hemoglobin 10 7, hematocrit 32 7, WBC 6 3, platelets 629     Physical Exam  Constitutional:       Appearance: She is not ill-appearing  HENT:      Head: Normocephalic and atraumatic  Mouth/Throat:      Mouth: Mucous membranes are moist    Eyes:      General:         Right eye: No discharge  Left eye: No discharge  Cardiovascular:      Rate and Rhythm: Normal rate and regular rhythm  Pulses: Normal pulses        Heart sounds: Normal heart sounds  Pulmonary:      Effort: Pulmonary effort is normal  No respiratory distress  Breath sounds: Normal breath sounds  No wheezing  Abdominal:      General: Bowel sounds are normal  There is no distension  Palpations: Abdomen is soft  Tenderness: There is no abdominal tenderness  There is no guarding  Genitourinary:     Comments: Incontinent of bowel and bladder  Musculoskeletal:      Cervical back: Normal range of motion and neck supple  No rigidity or tenderness  Right lower leg: No edema  Left lower leg: No edema  Comments: UE/LE contractures, rigidity at baseline   Skin:     General: Skin is warm  Coloration: Skin is not jaundiced  Findings: No bruising or erythema  Comments: Face flushed    Neurological:      Mental Status: She is alert  Mental status is at baseline  Motor: Weakness present        Gait: Gait abnormal       Comments: Pleasantly confused       Rich Maury, CRNP

## 2022-10-01 NOTE — ASSESSMENT & PLAN NOTE
Slightly elevated  Suspect due to decreased p o  intake  Continue to encourage adequate p o  hydration   P r n  bladder scan to monitor for urine retention  Will monitor renal function periodically  BMP in 10 days

## 2022-10-01 NOTE — ASSESSMENT & PLAN NOTE
PT/OT as needed per facility   Continue fall and safety precaution   Dependent for ADLs/IADLs  Continue with supportive care   PT/OT/SL/SW/DT/ following

## 2022-10-01 NOTE — ASSESSMENT & PLAN NOTE
Suspect decreased p o  intake  Continue to encourage adequate p o  hydration, increase fluids with meals   Bladder scan to monitor for urinary retention   Dependent for ADLs/IADLs  Monitor

## 2022-10-01 NOTE — ASSESSMENT & PLAN NOTE
Nursing reporting facial flushing  Per Day shift- nurse and staff, reporting, patient's mom recently had surgery  And no able to visit patient this week  Per nursing, patient behaves this way when mom is not around  Patient is afebrile, vital signs stable and nontoxic   Instructed nurse to monitor vital sign and if a change of mental status to swabbed patient for COVID-19   Since patient is free of fever and vital signs remained stable will monitor for now     No /GI discomfort per nurse  Last  9/29/22  Monitor closely

## 2022-10-07 ENCOUNTER — NURSING HOME VISIT (OUTPATIENT)
Dept: GERIATRICS | Facility: OTHER | Age: 57
End: 2022-10-07
Payer: MEDICARE

## 2022-10-07 DIAGNOSIS — R13.10 DYSPHAGIA, UNSPECIFIED TYPE: ICD-10-CM

## 2022-10-07 DIAGNOSIS — I10 ESSENTIAL HYPERTENSION: ICD-10-CM

## 2022-10-07 DIAGNOSIS — E87.0 HYPERNATREMIA: ICD-10-CM

## 2022-10-07 DIAGNOSIS — E03.9 HYPOTHYROIDISM, UNSPECIFIED TYPE: ICD-10-CM

## 2022-10-07 DIAGNOSIS — R26.2 AMBULATORY DYSFUNCTION: ICD-10-CM

## 2022-10-07 DIAGNOSIS — E87.5 HYPERKALEMIA: ICD-10-CM

## 2022-10-07 DIAGNOSIS — G80.9 CEREBRAL PALSY, UNSPECIFIED TYPE (HCC): ICD-10-CM

## 2022-10-07 DIAGNOSIS — R53.81 PHYSICAL DECONDITIONING: Primary | ICD-10-CM

## 2022-10-07 PROCEDURE — 99309 SBSQ NF CARE MODERATE MDM 30: CPT | Performed by: NURSE PRACTITIONER

## 2022-10-08 PROBLEM — E87.1 HYPONATREMIA: Status: ACTIVE | Noted: 2022-10-08

## 2022-10-08 NOTE — ASSESSMENT & PLAN NOTE
Potassium on 10/06/2022 mildly elevated 5 3, will repeat BMP on 10/11 and if necessary will hold medication with potential to increase potassium  Patient had some days with decreased p o  intake, encourage p o  hydration  Obtain potassium level on 10/11/2022 and trend

## 2022-10-08 NOTE — PROGRESS NOTES
Facility: [facility]  POS: [id number]  Progress Note    Chief Complaint/Reason for visit: LTC  f/u    Patient's care was coordinated with nursing facility staff  Recent vitals, labs, and updated medications were review on [facility] system in facility       Assessment/Plan:    Physical deconditioning  Multifactorial   Patient with known history of cerebral palsy  Noted UE/LE contractures and rigidity  Has received PT and OT services intermittently, currently not receiving  Continue to implement fall and safety precaution   Continue with pressure offloading and assist  Encourage adequate oral hydration and nutrition, continue with daily multivitamins and supplements   Continue with 24-7 supportive    Ambulatory dysfunction  PT/OT as needed per facility   Continue fall and safety precaution   Dependent for ADLs/IADLs  Continue with supportive care   PT/OT/SL/SW/DT/ following    Hyperkalemia  Potassium on 10/06/2022 mildly elevated 5 3, will repeat BMP on 10/11 and if necessary will hold medication with potential to increase potassium  Patient had some days with decreased p o  intake, encourage p o  hydration  Obtain potassium level on 10/11/2022 and trend    Hyponatremia  Recent level elevated, patient with decreased p o  intake  Will repeat BMP 10/11/2022 monitor and trend  Encourage p o  hydration  BMP on 10/11    Hypothyroidism  TSH normal at 0 43 with free T4 of 0 91  Continue levothyroxine 112 mcg daily  Continue to monitor     Cerebral palsy ([address] )  With known history  Dependent for ADLs/I ADLs  PT/OT as needed, no currently receiving therapy services at this time   Continue 24-7 supportive care    Dysphagia  With known history  Remains of pureed diet with nectar thick liquids  Tolerating current diet   Dependent for feedings   Maintain aspiration precaution   Speech therapy as needed    Essential hypertension  Blood pressure stable on valsartan to 40 mg daily and amlodipine 2 5 mg daily   Avoid hypotension Encourage low-sodium diet   Continue to monitor blood pressure and adjust medication as needed  If patient continues with hyperkalemia may need to subtitute valsartan with other antihypertensive agent     History of Present Illness: HPI   Batsheva Ríos is a 57-year old female resident of [facility] and now in [facility]  She is being seen today for her LTC f/u on her acute and chronic medical conditions  Received patient out of bed in her chair, resting  Appeared comfortable and was in no acute distress  review of system is limited due to being nonverbal   Appears weak and frail  Assist with feeds  Per chart review appetite is at her baseline and she consumes around % for most of her meals  She sleeps well at night  Vital sign has been stable  Patient had some days of not being herself  Per nursing staff patient was missing her mom, who is unable to visit for now  Labs work up was unremarkable  And patient back to herself  Per nurses aide patient is eating and just acting herself  No behaviors reported by nursing staff  Per nursing no other issues with cough, fever, diarrhea or constipation  Per nursing no other acute concerns or issues at this time  Past Medical History: unchanged from history and physical  Past Medical History:   Diagnosis Date    Arthritis     Blood pressure instability UNKNOWN    Bowel obstruction (HCC)     intestine    Bradycardia     Cerebral palsy (HCC)     Chronic edema     Closed fracture of coccyx (HCC)     DVT (deep [address] thrombosis) ([facility] [address] )     US Doppler done 10/2015 and 3/2016    GERD (gastroesophageal reflux disease)     History of Doppler ultrasound     of UE and LE; 10/2015 and 3/2016 DVT    History of echocardiogram 03/2016    EF 55%  Trace mitral and tricuspid regurgitation  PA systolic pressure 33  Echo Doppler 2/2017- EF 60%   History of EKG 10/20/2016    Normal sinus rhythm with sinus arrhythmia   Abnormal ECG     History of Holter monitoring 11/2017    14 DAY MONITOR Baseline rhythm was of sinus origin with rare APCs and VPCs and a few baseline motion artifact  NO OTHER ARRHYTHMIAS  No symptoms was reported in the attached diary   History of Holter monitoring 10/2016    Sinus rhythm  There were a few episodes of asymptomatic sinus arrhythmia  There was sinus bradycardia at rate as low as 47 bpm  There were rare single PACs and PVCs   Hypertension     Hyperthyroidism     Intellectual disability     Kidney stone     Lymphedema     Pneumonia     Stroke ([address] )     UNCERTAIN OF TYPE/NON VERBAL    Tachycardia     Thyroid disease     Urinary incontinence     Urinary tract infection      Family History: unchanged from history and physical  Social History: unchanged from history and physical  Resident Since:   Review of systems: Review of Systems   Reason unable to perform ROS: non verbal      Medications: All medication and routine orders were reviewed and updated  Allergies: Reviewed and unchanged  Consults reviewed:PT, OT, Speech and Nutrition  Labs/Diagnostics (reviewed by this provider): Copy in Chart  BMP/CBC review from 09/30/2022 blood glucose 122, BUN 42, creatinine 1 12, sodium 145, potassium 4 5, chloride 112, calcium 10 2, EGFR 57, hemoglobin 10 7, hematocrit 32 7, WBC 6 3, platelet 081     BMP reviewed on 10/06/2022   Blood glucose 85, BUN 33, creatinine 0 86, sodium 149, potassium 5 3, calcium 9 5, EGFR 79  Physical Exam    Weight: 138 2 lbs Temp:97 9 BP:132/77   Pulse:80   Resp:20 O2 Sat:97% RA  Constitutional: Cachetic  Orientation:Person     Physical Exam  Constitutional:       General: She is not in acute distress  Appearance: She is not ill-appearing  Comments: Chronically ill-appearing   HENT:      Head: Normocephalic and atraumatic  Nose: No rhinorrhea  Mouth/Throat:      Mouth: Mucous membranes are moist    Eyes:      General:         Right eye: No discharge           Left eye: Discharge present  Cardiovascular:      Rate and Rhythm: Normal rate and regular rhythm  Pulses: Normal pulses  Heart sounds: Normal heart sounds  Pulmonary:      Effort: Pulmonary effort is normal       Comments: B/L Diminished  Abdominal:      General: Bowel sounds are normal  There is no distension  Palpations: Abdomen is soft  Tenderness: There is no abdominal tenderness  There is no guarding  Genitourinary:     Comments: Incontinent of bowel and bladder  Musculoskeletal:         General: Deformity present  Cervical back: Normal range of motion and neck supple  Right lower leg: No edema  Left lower leg: No edema  Comments: UE/LE contractures   Skin:     General: Skin is warm  Coloration: Skin is not jaundiced  Findings: No erythema  Neurological:      Mental Status: She is disoriented  Cranial Nerves: No cranial nerve deficit  Motor: Weakness (generalized) present  Gait: Gait abnormal       Comments: Nonverbal, h/o CP     This note was completed in part utilizing Bracketr direct voice recognition software  Grammatical errors, random word insertion, spelling mistakes, and incomplete sentences may be an occasional consequence of the system secondary to software limitations, ambient noise and hardware issues  At the time of dictation, efforts were made to edit, clarify and/or correct errors  Please read the chart carefully and recognize, using context, where substitutions have occurred  If you have any questions or concerns about the context, text or information contained within the body of this dictation, please contact myself, the provider, for further clarification      Hebert Hanson  10/06/85927:40 AM

## 2022-10-08 NOTE — ASSESSMENT & PLAN NOTE
With known history  Dependent for ADLs/I ADLs  PT/OT as needed, no currently receiving therapy services at this time   Continue 24-7 supportive care

## 2022-10-08 NOTE — ASSESSMENT & PLAN NOTE
Recent level elevated, patient with decreased p o  intake  Will repeat BMP 10/11/2022 monitor and trend  Encourage p o  hydration  BMP on 10/11

## 2022-10-08 NOTE — ASSESSMENT & PLAN NOTE
With known history  Remains of pureed diet with nectar thick liquids  Tolerating current diet   Dependent for feedings   Maintain aspiration precaution   Speech therapy as needed

## 2022-10-08 NOTE — ASSESSMENT & PLAN NOTE
Blood pressure stable on valsartan to 40 mg daily and amlodipine 2 5 mg daily   Avoid hypotension   Encourage low-sodium diet   Continue to monitor blood pressure and adjust medication as needed  If patient continues with hyperkalemia may need to subtitute valsartan with other antihypertensive agent

## 2022-10-12 PROBLEM — S91.115A: Status: RESOLVED | Noted: 2021-11-17 | Resolved: 2022-10-12

## 2022-11-14 ENCOUNTER — NURSING HOME VISIT (OUTPATIENT)
Dept: GERIATRICS | Facility: OTHER | Age: 57
End: 2022-11-14

## 2022-11-14 VITALS
DIASTOLIC BLOOD PRESSURE: 90 MMHG | OXYGEN SATURATION: 99 % | RESPIRATION RATE: 18 BRPM | BODY MASS INDEX: 24.61 KG/M2 | SYSTOLIC BLOOD PRESSURE: 130 MMHG | WEIGHT: 126 LBS | HEART RATE: 74 BPM

## 2022-11-14 DIAGNOSIS — G80.9 CEREBRAL PALSY, UNSPECIFIED TYPE (HCC): Primary | ICD-10-CM

## 2022-11-15 NOTE — ASSESSMENT & PLAN NOTE
History of cerebral palsy  Patient is dependent for all ADLs  Remains incontinent of bowel and bladder    Continue supportive care

## 2022-11-15 NOTE — PROGRESS NOTES
eMly Gonzales  [facility]  [address]     Progress Note  [facility]    Patient Location     [address] [place] [address] rehab    Reason for visit     F U cerebral palsy HTN, Hypothyroidism, GERD    Problem List Items Addressed This Visit        Nervous and Auditory    Cerebral palsy ([facility] [address] ) - Primary     History of cerebral palsy  Patient is dependent for all ADLs  Remains incontinent of bowel and bladder  Continue supportive care              Hypernatremia:  Sodium level was elevated at 149 on 10/06/2022  Encourage fluids  Repeat BMP    Vitamin-D deficiency:  Continue vitamin-D supplement       Hypertension:  Blood pressure stable on amlodipine 2 5 mg and valsartan to 240 mg daily    Cerebral Palsy:  Continue supportive treatment       Constipation:  Stable on Linzess 145 mcg daily and Senokot     GERD:  Continue lansoprazole 30 mg daily     Chronic pain:  Stable on Meloxicam  HPI       Pt is being seen for a follow-up visit today  She is doing okay at present  No active issues have been reported by the staff  Patient appears comfortable in no distress  No episodes of irritability or agitation  No fever chills dyspnea or chest congestion  Patient is incontinent of bowel and bladder  Needs assistance with all ADLs    Review of Systems   Unable to perform ROS: Other   Constitutional: Negative for chills and fever  Respiratory: Negative for shortness of breath and wheezing  Gastrointestinal: Negative for abdominal distention and vomiting  Musculoskeletal: Positive for gait problem  Neurological: Positive for weakness (Generalized)  Psychiatric/Behavioral: Positive for confusion  Negative for agitation         Past Medical History:   Diagnosis Date   • Arthritis    • Blood pressure instability UNKNOWN   • Bowel obstruction (HCC)     intestine   • Bradycardia    • Cerebral palsy (HCC)    • Chronic edema    • Closed fracture of coccyx ([facility] [address] ) • DVT (deep venous thrombosis) ([facility] )     US Doppler done 10/2015 and 3/2016   • GERD (gastroesophageal reflux disease)    • History of Doppler ultrasound     of UE and LE; 10/2015 and 3/2016 DVT   • History of echocardiogram 03/2016    EF 55%  Trace mitral and tricuspid regurgitation  PA systolic pressure 33  Echo Doppler 2/2017- EF 60%  • History of EKG 10/20/2016    Normal sinus rhythm with sinus arrhythmia  Abnormal ECG    • History of Holter monitoring 11/2017    14 DAY MONITOR Baseline rhythm was of sinus origin with rare APCs and VPCs and a few baseline motion artifact  NO OTHER ARRHYTHMIAS  No symptoms was reported in the attached diary  • History of Holter monitoring 10/2016    Sinus rhythm  There were a few episodes of asymptomatic sinus arrhythmia  There was sinus bradycardia at rate as low as 47 bpm  There were rare single PACs and PVCs      • Hypertension    • Hyperthyroidism    • Intellectual disability    • Kidney stone    • Lymphedema    • Pneumonia    • Stroke ([facility] )     UNCERTAIN OF TYPE/NON VERBAL   • Tachycardia    • Thyroid disease    • Urinary incontinence    • Urinary tract infection        Past Surgical History:   Procedure Laterality Date   • ABDOMINAL HYSTERECTOMY     • COLONOSCOPY     • EYE SURGERY     • HYSTERECTOMY     • HYSTERECTOMY     • IR DRAINAGE TUBE PLACEMENT  6/12/2020   • SMALL INTESTINE SURGERY     • US GUIDED VASCULAR ACCESS  1/18/2017       Social History     Tobacco Use   Smoking Status Never Smoker   Smokeless Tobacco Never Used       Family History   Problem Relation Age of Onset   • Cancer Father    • Colon cancer Father    • Asthma Mother    • Stroke Mother    • WESTLEY disease Mother    • Arthritis Mother    • Diabetes type II Mother    • Mitral valve prolapse Mother    • Irritable bowel syndrome Mother    • Heart disease Mother         Allergies   Allergen Reactions   • Bethanechol      Reaction Date: 10Dec2007; SEIZURES     • Metoclopramide      Reaction Date: [id number]; SEIZURES     • Sulfamethoxazole-Trimethoprim      Reaction Date: 10Dec2007; SEIZURES           Current Outpatient Medications:   •  acetaminophen (TYLENOL) 325 mg tablet, Take 650 mg by mouth 2 (two) times a day, Disp: , Rfl:   •  amLODIPine (NORVASC) 2 5 mg tablet, Take 2 5 mg by mouth daily GIVE FOR SBP >130, Disp: , Rfl:   •  aspirin 81 mg chewable tablet, Chew 1 tablet (81 mg total) daily for 7 days, Disp: 7 tablet, Rfl: 0  •  Balsam [place]-Castor Oil (Venelex) OINT, Apply 1 application topically 2 (two) times a day for 7 days  - apply to buttocks in AM + PM until resolved, Disp: 30 g, Rfl: 1  •  bisacodyl (DULCOLAX) 10 mg suppository, Insert 10 mg into the rectum daily as needed, Disp: , Rfl:   •  cholecalciferol (VITAMIN D3) 1,000 units tablet, Take 1,000 Units by mouth daily, Disp: , Rfl:   •  DULoxetine (CYMBALTA) 30 mg delayed release capsule, Take 30 mg by mouth daily FOR FIBROMYALGIA, Disp: , Rfl:   •  Garlic 8317 MG CAPS, Take 1 capsule by mouth daily, Disp: , Rfl:   •  lansoprazole (PREVACID) 30 mg capsule, TAKE 1 CAPSULE BY MOUTH EVERY DAY, Disp: 90 capsule, Rfl: 3  •  Levothyroxine Sodium 112 MCG CAPS, Take by mouth daily, Disp: , Rfl:   •  Linzess 145 MCG CAPS, TAKE 1 CAPSULE BY MOUTH EVERY DAY, Disp: 90 capsule, Rfl: 1  •  magnesium hydroxide (MILK OF MAGNESIA) 400 mg/5 mL oral suspension, Take 30 mL by mouth daily as needed, Disp: , Rfl:   •  meloxicam (MOBIC) 7 5 mg tablet, Take 1 tablet (7 5 mg total) by mouth daily for 7 days, Disp: 7 tablet, Rfl: 0  •  senna (SENOKOT) 8 6 MG tablet, Take 1 tablet (8 6 mg total) by mouth daily for 7 days, Disp: 7 tablet, Rfl: 0  •  Sodium Phosphates (FLEET ENEMA RE), Insert 1 Bottle into the rectum daily as needed, Disp: , Rfl:   •  valsartan (DIOVAN) 320 MG tablet, Take 240 mg by mouth daily PATIENT IS CURRENTLY  MG DAILY AT [facility] , Disp: , Rfl:     Updated list was reviewed in [facility] system of facility       /75, HR 78, Tm 97 6, Wt 126 lbs, RR 18    Physical Exam  Constitutional:       General: She is not in acute distress  HENT:      Head: Normocephalic and atraumatic  Eyes:      General: No scleral icterus  Cardiovascular:      Rate and Rhythm: Normal rate and regular rhythm  Pulmonary:      Breath sounds: No wheezing or rales  Abdominal:      General: There is no distension  Palpations: Abdomen is soft  Tenderness: There is no abdominal tenderness  Musculoskeletal:         General: Deformity (Contractures of knees and upper extremities) present  Cervical back: Neck supple  Comments: Chronic edema involving dorsum of feet bilaterally and  dorsal aspect of hands   Skin:     Coloration: Skin is not jaundiced  Neurological:      Mental Status: She is disoriented  Cranial Nerves: No cranial nerve deficit  Motor: Weakness (Chronic weakness involving all extremities  Contractures of knees bilateral upper extremities noted) present           Diagnostic Data:    Labs done on 10/06/22 revealed BUN of 33, creatinine 0 86, sodium was high at 149, potassium 5 3, chloride 118    TSH was 0 43 with free T4 of 0 91 on 08/18  CBC BMP on 05/19/2022 revealed hemoglobin of 10 1, WBC count 4 4, platelet count 677  BUN 23, creatinine 0 65, sodium 141, potassium 5 1      This note was electronically signed by Dr Charmayne Obey

## 2022-12-16 ENCOUNTER — NURSING HOME VISIT (OUTPATIENT)
Dept: GERIATRICS | Facility: OTHER | Age: 57
End: 2022-12-16

## 2022-12-16 DIAGNOSIS — R26.2 AMBULATORY DYSFUNCTION: ICD-10-CM

## 2022-12-16 DIAGNOSIS — G80.9 CEREBRAL PALSY, UNSPECIFIED TYPE (HCC): ICD-10-CM

## 2022-12-16 DIAGNOSIS — E03.9 HYPOTHYROIDISM, UNSPECIFIED TYPE: ICD-10-CM

## 2022-12-16 DIAGNOSIS — I10 ESSENTIAL HYPERTENSION: ICD-10-CM

## 2022-12-16 DIAGNOSIS — K21.9 GASTROESOPHAGEAL REFLUX DISEASE, UNSPECIFIED WHETHER ESOPHAGITIS PRESENT: Primary | ICD-10-CM

## 2022-12-16 DIAGNOSIS — D55.9 ANEMIA DUE TO ENZYME DISORDER (HCC): ICD-10-CM

## 2022-12-18 RX ORDER — METHOCARBAMOL 500 MG/1
500 TABLET, FILM COATED ORAL EVERY 6 HOURS PRN
COMMUNITY

## 2022-12-18 RX ORDER — MELOXICAM 7.5 MG/1
7.5 TABLET ORAL DAILY
COMMUNITY

## 2022-12-18 RX ORDER — SENNA PLUS 8.6 MG/1
1 TABLET ORAL DAILY
COMMUNITY

## 2022-12-19 NOTE — ASSESSMENT & PLAN NOTE
With known history   Dependent for all ADLS/IADLs  B/B incontinent   Sling zane to recliner  Continue 24/7 supportive care

## 2022-12-19 NOTE — PROGRESS NOTES
Facility: [facility]  POS: [id number]  Progress Note    Chief Complaint/Reason for visit: LTC  f/u    Patient's care was coordinated with nursing facility staff  Recent vitals, labs, and updated medications were review on [facility] system in facility  Assessment/Plan:    GERD (gastroesophageal reflux disease)  Stable  Continue Lansoprazole 30 mg daily     Hypothyroidism  TSH normal at 0 43 with free T4 of 0 91  Continue levothyroxine 112 mcg daily  Will repeat TSH around February 2023  Continue to monitor     Essential hypertension  Blood pressure stable   Bp this /80  SBps trending 108s - 144s (12/9-16)  C/w  valsartan to 40 mg daily and amlodipine 2 5 mg daily   Avoid hypotension   Encourage low-sodium diet   Continue to monitor blood pressure and adjust medication as needed  Slightly elevated K level for now monitor if continues may need to d/c ARB    Cerebral palsy ([facility] [address] )  With known history   Dependent for all ADLS/IADLs  B/B incontinent  Sling zane to recliner  Continue 24/7 supportive care     Ambulatory dysfunction  PT/OT as needed per facility   Continue fall and safety precaution   Dependent for ADLs/IADLs  Continue with supportive care   PT/OT/SL/SW/DT/ following    Other specified anemias  Last hemoglobin 10 7, hct 32 7, mcv 85  (9/30/22)  Per records review baseline hemoglobin appears to be around 10-11 5  C/w MVI and supplements   No overt bleeding noted or reported  Next CBC 1/10/2023    History of Present Illness: YADY Balderas is a 57-year old female resident of [facility]  She is being seen today for her LTC f/u on her acute and chronic medical conditions  Received patient lying in bed, resting  Appeared comfortable and is in no acute distress  Pleasantly confused  Poor historian  Mom spoke with nursing and this provider present requesting robaxin muscle relaxant as pt was in pain  Ok to add  Appears weak and frail  Assist with feeds   Per chart review appetite is at her baseline  Pt consumes around % for most of her meals  She sleeps well at night  Vital sign has been stable  No behaviors reported by nursing staff  Per nursing no /Gi discomfort  Per nursing no other issues with cough, fever, diarrhea or constipation  Per nursing no other acute concerns or issues at this time  Past Medical History: unchanged from history and physical  Past Medical History:   Diagnosis Date   • Arthritis    • Blood pressure instability UNKNOWN   • Bowel obstruction (HCC)     intestine   • Bradycardia    • Cerebral palsy (HCC)    • Chronic edema    • Closed fracture of coccyx ([facility])    • DVT (deep venous thrombosis) ([facility] )     US Doppler done 10/2015 and 3/2016   • GERD (gastroesophageal reflux disease)    • History of Doppler ultrasound     of UE and LE; 10/2015 and 3/2016 DVT   • History of echocardiogram 03/2016    EF 55%  Trace mitral and tricuspid regurgitation  PA systolic pressure 33  Echo Doppler 2/2017- EF 60%  • History of EKG 10/20/2016    Normal sinus rhythm with sinus arrhythmia  Abnormal ECG    • History of Holter monitoring 11/2017    14 DAY MONITOR Baseline rhythm was of sinus origin with rare APCs and VPCs and a few baseline motion artifact  NO OTHER ARRHYTHMIAS  No symptoms was reported in the attached diary  • History of Holter monitoring 10/2016    Sinus rhythm  There were a few episodes of asymptomatic sinus arrhythmia  There was sinus bradycardia at rate as low as 47 bpm  There were rare single PACs and PVCs      • Hypertension    • Hyperthyroidism    • Intellectual disability    • Kidney stone    • Lymphedema    • Pneumonia    • Stroke ([place] [address] )     UNCERTAIN OF TYPE/NON VERBAL   • Tachycardia    • Thyroid disease    • Urinary incontinence    • Urinary tract infection      Family History: unchanged from history and physical  Social History: unchanged from history and physical  Resident Since:   Review of systems: Review of Systems   Reason unable to perform ROS: non verbal      Medications: All medication and routine orders were reviewed and updated  Allergies: Reviewed and unchanged  Consults reviewed:PT, OT, Speech and Nutrition  Labs/Diagnostics (reviewed by this provider): Copy in Chart     BMP/CBC review from 09/30/2022 blood glucose 122, BUN 42, creatinine 1 12, sodium 145, potassium 4 5, chloride 112, calcium 10 2, EGFR 57, hemoglobin 10 7, hematocrit 32 7, WBC 6 3, platelet 345     BMP reviewed on 10/06/2022   Blood glucose 85, BUN 33, creatinine 0 86, sodium 149, potassium 5 3, calcium 9 5, EGFR 79  Physical Exam    Weight: 126 0 lbs Temp:97 9 BP:124/80  Pulse:80   Resp:20 O2 Sat:97% RA  Constitutional: Cachetic  Orientation:Person     Physical Exam  Constitutional:       General: She is not in acute distress  Appearance: She is not ill-appearing  Comments: Chronically ill-appearing   HENT:      Head: Normocephalic and atraumatic  Nose: No rhinorrhea  Mouth/Throat:      Mouth: Mucous membranes are moist    Eyes:      General:         Right eye: No discharge  Left eye: Discharge present  Cardiovascular:      Rate and Rhythm: Normal rate and regular rhythm  Pulses: Normal pulses  Heart sounds: Normal heart sounds  Pulmonary:      Effort: Pulmonary effort is normal       Comments: B/L Diminished  Abdominal:      General: Bowel sounds are normal  There is no distension  Palpations: Abdomen is soft  Tenderness: There is no abdominal tenderness  There is no guarding  Genitourinary:     Comments: Incontinent of bowel and bladder  Musculoskeletal:         General: Deformity present  Cervical back: Normal range of motion and neck supple  Right lower leg: No edema  Left lower leg: No edema  Comments: UE/LE contractures   Skin:     General: Skin is warm  Coloration: Skin is not jaundiced  Findings: No erythema  Neurological:      Mental Status: She is disoriented  Cranial Nerves:  No cranial nerve deficit  Motor: Weakness (generalized) present  Gait: Gait abnormal       Comments: Nonverbal, h/o CP     This note was completed in part utilizing m-modal fluency direct voice recognition software  Grammatical errors, random word insertion, spelling mistakes, and incomplete sentences may be an occasional consequence of the system secondary to software limitations, ambient noise and hardware issues  At the time of dictation, efforts were made to edit, clarify and/or correct errors  Please read the chart carefully and recognize, using context, where substitutions have occurred  If you have any questions or concerns about the context, text or information contained within the body of this dictation, please contact myself, the provider, for further clarification      Tiffany Valverde  12/16/202211:57 PM

## 2022-12-19 NOTE — ASSESSMENT & PLAN NOTE
Last hemoglobin 10 7, hct 32 7, mcv 85  (9/30/22)  Per records review baseline hemoglobin appears to be around 10-11 5  C/w MVI and supplements   No overt bleeding noted or reported  Next CBC 1/10/2023

## 2022-12-19 NOTE — ASSESSMENT & PLAN NOTE
Blood pressure stable   Bp this /80   SBps trending 108s - 144s (12/9-16)  C/w  valsartan to 40 mg daily and amlodipine 2 5 mg daily   Avoid hypotension   Encourage low-sodium diet   Continue to monitor blood pressure and adjust medication as needed  Slightly elevated K level for now monitor if continues may need to d/c ARB

## 2022-12-19 NOTE — ASSESSMENT & PLAN NOTE
TSH normal at 0 43 with free T4 of 0 91  Continue levothyroxine 112 mcg daily  Will repeat TSH around February 2023  Continue to monitor

## 2022-12-27 NOTE — PATIENT INSTRUCTIONS
Orders Placed This Encounter   Procedures    Wound cleansing and dressings     Location : All the wounds on the toes  Cleanse with NSS  Apply betadine to wound bed   Insert lamb wool in between each toes  Daily and prn for soiling     Offload all wounds  Turn and reposition frequently, maximum of every two hours  Instruct / Assist with weight shifting every 15 - 20 minutes when in chair  Increase protein intake  Monitor for any sign of infection or worsening, inform PCP or patient's primary physician in your facility       Standing Status:   Future     Standing Expiration Date:   9/29/2022 Skin normal color for race, warm, dry and intact. No evidence of rash. 5

## 2023-01-27 ENCOUNTER — NURSING HOME VISIT (OUTPATIENT)
Dept: GERIATRICS | Facility: OTHER | Age: 58
End: 2023-01-27

## 2023-01-27 DIAGNOSIS — E03.9 HYPOTHYROIDISM, UNSPECIFIED TYPE: Primary | ICD-10-CM

## 2023-01-28 VITALS
WEIGHT: 127.3 LBS | OXYGEN SATURATION: 97 % | BODY MASS INDEX: 24.86 KG/M2 | TEMPERATURE: 98.5 F | DIASTOLIC BLOOD PRESSURE: 60 MMHG | RESPIRATION RATE: 18 BRPM | HEART RATE: 80 BPM | SYSTOLIC BLOOD PRESSURE: 109 MMHG

## 2023-01-29 NOTE — PROGRESS NOTES
[facility] [place]  [address]   [address]  48     Progress Note  Code [facility] 32    Patient Location     [facility] rehab    Reason for visit     F U cerebral palsy HTN, Hypothyroidism, GERD    Problem List Items Addressed This Visit        Endocrine    Hypothyroidism - Primary     TSH was low at  0 09 on 1/9/2023  Levothyroxine dose was reduced to 88 MCG daily  Repeat TSH is due on 3/3/2023  Will follow  Vitamin-D deficiency:  Continue vitamin-D supplement     Hypertension:  Blood pressure stable on amlodipine 2 5 mg and valsartan to 240 mg daily    Cerebral Palsy:  Continue supportive treatment  Patient needs assistance with all ADLs     Constipation:  Stable on Linzess 145 mcg daily and Senokot     GERD:  Continue lansoprazole 30 mg daily     Chronic pain:  Continue Tylenol 650 mg twice daily  Patient additionally remains on meloxicam 7 5 mg daily  Ideally would like to avoid long-term use of meloxicam   To be  discussed with mother  Hypernatremia:  Sodium level was elevated at 149 on 10/06/2022, resolved on repeat BMP from 12/21/2022 with reading of 143  Encourage p o  water intake  HPI       Patient is being seen for a follow-up visit  She is doing okay at present  Remains confused unable to provide any history  No active issues have been reported by staff  TSH level was noted to be low recently  Levothyroxine dose was reduced  There have been no reports of any fever chills dyspnea or chest congestion  Patient is dependent for all ADLs, remains incontinent of bowel and bladder  Review of Systems   Unable to perform ROS: Other   Constitutional: Negative for chills and fever  Respiratory: Negative for shortness of breath and wheezing  Gastrointestinal: Negative for abdominal distention and vomiting  Musculoskeletal: Positive for gait problem  Neurological: Positive for weakness (Generalized)  Psychiatric/Behavioral: Positive for confusion  Negative for agitation  Past Medical History:   Diagnosis Date   • Arthritis    • Blood pressure instability UNKNOWN   • Bowel obstruction (HCC)     intestine   • Bradycardia    • Cerebral palsy [facility])    • Chronic edema    • Closed fracture of coccyx (HCC)    • DVT (deep venous thrombosis) ([facility] )     US Doppler done 10/2015 and 3/2016   • GERD (gastroesophageal reflux disease)    • History of Doppler ultrasound     of UE and LE; 10/2015 and 3/2016 DVT   • History of echocardiogram 03/2016    EF 55%  Trace mitral and tricuspid regurgitation  PA systolic pressure 33  Echo Doppler 2/2017- EF 60%  • History of EKG 10/20/2016    Normal sinus rhythm with sinus arrhythmia  Abnormal ECG    • History of Holter monitoring 11/2017    14 DAY MONITOR Baseline rhythm was of sinus origin with rare APCs and VPCs and a few baseline motion artifact  NO OTHER ARRHYTHMIAS  No symptoms was reported in the attached diary  • History of Holter monitoring 10/2016    Sinus rhythm  There were a few episodes of asymptomatic sinus arrhythmia  There was sinus bradycardia at rate as low as 47 bpm  There were rare single PACs and PVCs      • Hypertension    • Hyperthyroidism    • Intellectual disability    • Kidney stone    • Lymphedema    • Pneumonia    • Stroke ([facility] )     UNCERTAIN OF TYPE/NON VERBAL   • Tachycardia    • Thyroid disease    • Urinary incontinence    • Urinary tract infection        Past Surgical History:   Procedure Laterality Date   • ABDOMINAL HYSTERECTOMY     • COLONOSCOPY     • EYE SURGERY     • HYSTERECTOMY     • HYSTERECTOMY     • IR DRAINAGE TUBE PLACEMENT  6/12/2020   • SMALL INTESTINE SURGERY     • US GUIDED VASCULAR ACCESS  1/18/2017       Social History     Tobacco Use   Smoking Status Never   Smokeless Tobacco Never       Family History   Problem Relation Age of Onset   • Cancer Father    • Colon cancer Father    • Asthma Mother    • Stroke Mother    • WESTLEY disease Mother    • Arthritis Mother    • Diabetes type II Mother    • Mitral valve prolapse Mother    • Irritable bowel syndrome Mother    • Heart disease Mother         Allergies   Allergen Reactions   • Bethanechol      Reaction Date: 10Dec2007; SEIZURES     • Metoclopramide      Reaction Date: 10Dec2007; SEIZURES     • Sulfamethoxazole-Trimethoprim      Reaction Date: 10Dec2007; SEIZURES           Current Outpatient Medications:   •  acetaminophen (TYLENOL) 325 mg tablet, Take 650 mg by mouth 2 (two) times a day, Disp: , Rfl:   •  amLODIPine (NORVASC) 2 5 mg tablet, Take 2 5 mg by mouth daily GIVE FOR SBP >130, Disp: , Rfl:   •  aspirin 81 mg chewable tablet, Chew 1 tablet (81 mg total) daily for 7 days, Disp: 7 tablet, Rfl: 0  •  Balsam [place]-Castor Oil (Venelex) OINT, Apply 1 application topically 2 (two) times a day for 7 days  - apply to buttocks in AM + PM until resolved, Disp: 30 g, Rfl: 1  •  bisacodyl (DULCOLAX) 10 mg suppository, Insert 10 mg into the rectum daily as needed, Disp: , Rfl:   •  cholecalciferol (VITAMIN D3) 1,000 units tablet, Take 1,000 Units by mouth daily, Disp: , Rfl:   •  DULoxetine (CYMBALTA) 30 mg delayed release capsule, Take 30 mg by mouth daily FOR FIBROMYALGIA, Disp: , Rfl:   •  Garlic 3903 MG CAPS, Take 1 capsule by mouth daily, Disp: , Rfl:   •  lansoprazole (PREVACID) 30 mg capsule, TAKE 1 CAPSULE BY MOUTH EVERY DAY, Disp: 90 capsule, Rfl: 3  •  Levothyroxine Sodium 112 MCG CAPS, Take by mouth daily, Disp: , Rfl:   •  Linzess 145 MCG CAPS, TAKE 1 CAPSULE BY MOUTH EVERY DAY, Disp: 90 capsule, Rfl: 1  •  magnesium hydroxide (MILK OF MAGNESIA) 400 mg/5 mL oral suspension, Take 30 mL by mouth daily as needed, Disp: , Rfl:   •  meloxicam (MOBIC) 7 5 mg tablet, Take 7 5 mg by mouth daily, Disp: , Rfl:   •  methocarbamol (ROBAXIN) 500 mg tablet, Take 500 mg by mouth every 6 (six) hours as needed Muscle spasms, Disp: , Rfl:   •  senna (SENOKOT) 8 6 MG tablet, Take 1 tablet by mouth daily, Disp: , Rfl:   •  Sodium Phosphates (FLEET ENEMA RE), Insert 1 Bottle into the rectum daily as needed, Disp: , Rfl:   •  valsartan (DIOVAN) 320 MG tablet, Take 240 mg by mouth daily PATIENT IS CURRENTLY  MG DAILY AT [facility] , Disp: , Rfl:     Updated list was reviewed in [facility] of facility  /60, HR 80, Tm 98 5, Wt 127llbs, RR 18    Physical Exam  Constitutional:       General: She is not in acute distress  HENT:      Head: Normocephalic and atraumatic  Eyes:      General: No scleral icterus  Cardiovascular:      Rate and Rhythm: Normal rate and regular rhythm  Pulmonary:      Breath sounds: No wheezing or rales  Abdominal:      General: There is no distension  Palpations: Abdomen is soft  Tenderness: There is no abdominal tenderness  Musculoskeletal:         General: Deformity (Contractures of knees and upper extremities) present  Cervical back: Neck supple  Comments: Pt has chronic edema involving dorsum of feet bilaterally  Skin:     Coloration: Skin is pale  Skin is not jaundiced  Neurological:      Mental Status: She is disoriented  Cranial Nerves: No cranial nerve deficit  Motor: Weakness (Chronic weakness involving all extremities  Contractures of knees bilateral upper extremities noted) present         Diagnostic Data:  TSH level was low at 0 09 on 1/9/2023, vitamin D level was 66  Labs done on 12/21/2022 revealed hemoglobin of 11 1, WBC count 4 3, platelet count 448  BUN 18, creatinine 0 68, sodium 143, potassium 5 1        This note was electronically signed by Dr Reji Silva

## 2023-01-29 NOTE — ASSESSMENT & PLAN NOTE
TSH was low at  0 09 on 1/9/2023  Levothyroxine dose was reduced to 88 MCG daily  Repeat TSH is due on 3/3/2023  Will follow

## 2023-02-27 ENCOUNTER — NURSING HOME VISIT (OUTPATIENT)
Dept: GERIATRICS | Facility: OTHER | Age: 58
End: 2023-02-27

## 2023-02-27 DIAGNOSIS — E03.9 HYPOTHYROIDISM, UNSPECIFIED TYPE: ICD-10-CM

## 2023-02-27 DIAGNOSIS — R53.81 PHYSICAL DECONDITIONING: ICD-10-CM

## 2023-02-27 DIAGNOSIS — G80.9 CEREBRAL PALSY, UNSPECIFIED TYPE (HCC): ICD-10-CM

## 2023-02-27 DIAGNOSIS — I10 ESSENTIAL HYPERTENSION: Primary | ICD-10-CM

## 2023-02-27 DIAGNOSIS — K21.9 GASTROESOPHAGEAL REFLUX DISEASE, UNSPECIFIED WHETHER ESOPHAGITIS PRESENT: ICD-10-CM

## 2023-03-01 NOTE — ASSESSMENT & PLAN NOTE
Multifactorial in the setting of acute and chronic medical conditions   Patient with known history of cerebral palsy  Noted UE/LE contractures and rigidity  Bed bound   Continue restorative services   Continue to implement fall and safety precaution   Continue with pressure offloading and assist  Encourage adequate oral hydration and nutrition, continue with daily multivitamins and supplements   Continue with 24-7 supportive

## 2023-03-01 NOTE — ASSESSMENT & PLAN NOTE
Blood pressure stable   Bp (106s-132s / 56s-80s) BP /68     C/w  valsartan 240 mg daily and amlodipine 2 5 mg daily   Avoid hypotension and tight control in this debilitated pt   Encourage low-sodium diet   Continue to monitor blood pressure and adjust medication as needed

## 2023-03-01 NOTE — PROGRESS NOTES
Facility: [facility]  POS: [id number]  Progress Note    Chief Complaint/Reason for visit: LTC monthly f/u    Patient's care was coordinated with nursing facility staff  Recent vitals, labs, and updated medications were review on [facility] system in facility  Assessment/Plan:    Essential hypertension  Blood pressure stable   Bp (106s-132s / 56s-80s) BP /68  C/w  valsartan 240 mg daily and amlodipine 2 5 mg daily   Avoid hypotension and tight control in this debilitated pt   Encourage low-sodium diet   Continue to monitor blood pressure and adjust medication as needed    Hypothyroidism  TSH was low at  0 09 on 1/9/2023  Levothyroxine dose was reduced to 88 MCG daily  Repeat TSH is due on 3/3/2023  Will follow  GERD (gastroesophageal reflux disease)  Stable  Continue Lansoprazole 30 mg daily     Physical deconditioning  Multifactorial in the setting of acute and chronic medical conditions   Patient with known history of cerebral palsy  Noted UE/LE contractures and rigidity  Bed bound   Continue restorative services   Continue to implement fall and safety precaution   Continue with pressure offloading and assist  Encourage adequate oral hydration and nutrition, continue with daily multivitamins and supplements   Continue with 24-7 supportive    Cerebral palsy ([facility] )  With known history   Dependent for all ADLS/IADLs  B/B incontinent  Sling zane to recliner  Continue 24/7 supportive care     History of Present Illness: YADY   Sonny Reynolds is a 57-year old female resident of [facility]  She is being seen today for her LTC monthly f/u  Received patient lying in bed, resting  Appeared comfortable and is in no acute distress  Pleasantly confused  Poor historian  Noted with B/L LE/UE contractures  Dependent for all ADLs/IADLs  Incontinent of B/B  Bed bound  Appears weak and frail  Nursing continues to assist with feeds  Per chart review appetite is at her baseline   Pt consumes around % for most of her meals  She sleeps well at night  Vital sign has been stable  No behaviors reported by nursing staff  Per nursing no /GI discomfort  Mom visits every day and is active in pt's care plan  Per nursing no other issues with cough, fever, diarrhea or constipation  Per nursing no other acute concerns or issues at this time  Past Medical History: unchanged from history and physical  Past Medical History:   Diagnosis Date   • Arthritis    • Blood pressure instability UNKNOWN   • Bowel obstruction (HCC)     intestine   • Bradycardia    • Cerebral palsy (HCC)    • Chronic edema    • Closed fracture of coccyx ([facility])    • DVT (deep venous thrombosis) ([facility] )     US Doppler done 10/2015 and 3/2016   • GERD (gastroesophageal reflux disease)    • History of Doppler ultrasound     of UE and LE; 10/2015 and 3/2016 DVT   • History of echocardiogram 03/2016    EF 55%  Trace mitral and tricuspid regurgitation  PA systolic pressure 33  Echo Doppler 2/2017- EF 60%  • History of EKG 10/20/2016    Normal sinus rhythm with sinus arrhythmia  Abnormal ECG    • History of Holter monitoring 11/2017    14 DAY MONITOR Baseline rhythm was of sinus origin with rare APCs and VPCs and a few baseline motion artifact  NO OTHER ARRHYTHMIAS  No symptoms was reported in the attached diary  • History of Holter monitoring 10/2016    Sinus rhythm  There were a few episodes of asymptomatic sinus arrhythmia  There was sinus bradycardia at rate as low as 47 bpm  There were rare single PACs and PVCs      • Hypertension    • Hyperthyroidism    • Intellectual disability    • Kidney stone    • Lymphedema    • Pneumonia    • Stroke ([facility] )     UNCERTAIN OF TYPE/NON VERBAL   • Tachycardia    • Thyroid disease    • Urinary incontinence    • Urinary tract infection      Family History: unchanged from history and physical  Social History: unchanged from history and physical  Resident Since:   Review of systems: Review of Systems   Reason unable to perform ROS: non verbal      Medications: All medication and routine orders were reviewed and updated  Allergies: Reviewed and unchanged  Consults reviewed:PT, OT, Speech and Nutrition  Labs/Diagnostics (reviewed by this provider): Copy in Chart reviewed from [facility]    Physical Exam    Weight: 117 6 lbs Temp:97 9 BP:132/68  Pulse:80   Resp:20 O2 Sat:97% RA    Constitutional: Cachetic  Orientation:Person   Physical Exam  Constitutional:       General: She is not in acute distress  Appearance: She is not ill-appearing  Comments: Chronically ill-appearing   HENT:      Head: Normocephalic and atraumatic  Nose: No rhinorrhea  Mouth/Throat:      Mouth: Mucous membranes are moist    Eyes:      General:         Right eye: No discharge  Left eye: Discharge present  Cardiovascular:      Rate and Rhythm: Normal rate and regular rhythm  Pulses: Normal pulses  Heart sounds: Normal heart sounds  Pulmonary:      Effort: Pulmonary effort is normal       Comments: B/L Diminished  Abdominal:      General: Bowel sounds are normal  There is no distension  Palpations: Abdomen is soft  Tenderness: There is no abdominal tenderness  There is no guarding  Genitourinary:     Comments: Incontinent of bowel and bladder  Musculoskeletal:         General: Deformity present  Cervical back: Normal range of motion and neck supple  Right lower leg: No edema  Left lower leg: No edema  Comments: UE/LE contractures   Skin:     General: Skin is warm  Coloration: Skin is not jaundiced  Findings: No erythema  Neurological:      Mental Status: She is disoriented  Cranial Nerves: No cranial nerve deficit  Motor: Weakness (generalized) present  Gait: Gait abnormal       Comments: Nonverbal, h/o CP     This note was completed in part utilizing [employer] fluency direct voice recognition software    Grammatical errors, random word insertion, spelling mistakes, and incomplete sentences may be an occasional consequence of the system secondary to software limitations, ambient noise and hardware issues  At the time of dictation, efforts were made to edit, clarify and/or correct errors  Please read the chart carefully and recognize, using context, where substitutions have occurred  If you have any questions or concerns about the context, text or information contained within the body of this dictation, please contact myself, the provider, for further clarification      Judith Notice  2/84/[id number] PM

## 2023-03-31 ENCOUNTER — NURSING HOME VISIT (OUTPATIENT)
Dept: GERIATRICS | Facility: OTHER | Age: 58
End: 2023-03-31

## 2023-03-31 VITALS
OXYGEN SATURATION: 98 % | WEIGHT: 118 LBS | DIASTOLIC BLOOD PRESSURE: 64 MMHG | SYSTOLIC BLOOD PRESSURE: 108 MMHG | TEMPERATURE: 96.7 F | RESPIRATION RATE: 18 BRPM | BODY MASS INDEX: 23.05 KG/M2 | HEART RATE: 65 BPM

## 2023-03-31 DIAGNOSIS — I10 ESSENTIAL HYPERTENSION: Primary | ICD-10-CM

## 2023-03-31 NOTE — PROGRESS NOTES
[facility] [place], [place]  [address]   [address]  48     Progress Note  Code [facility] 32    Patient Location     [address] [place] [address] rehab    Reason for visit     F U cerebral palsy, vitamin D deficiency, HTN, Hypothyroidism, GERD    Problem List Items Addressed This Visit        Cardiovascular and Mediastinum    Essential hypertension - Primary     Blood pressure stable on amlodipine 2 5 mg and valsartan to 240 mg daily             Vitamin-D deficiency:  Continue vitamin-D supplement       Cerebral Palsy:  Patient has known history of cerebral palsy  Needs assistance with all ADLs  She is noted to have contractures of all extremities  Continue supportive care    Constipation:  Stable on Linzess 145 mcg daily and Senokot daily in the evening     GERD:  Continue lansoprazole 30 mg daily     Chronic pain:  Continue Tylenol 650 mg twice daily  DC Meloxicam  D W mother  Agrees with above    HPI       Patient is being seen for a follow-up visit today  She is resting comfortably  No active issues have been reported by the staff  No fever chills or dyspnea  Patient needs assistance with all ADLs  Remains incontinent of bowel and bladder  No acute pain issues  No recent falls  No behavioral issues    Review of Systems   Unable to perform ROS: Other   Constitutional: Negative for chills and fever  Respiratory: Negative for shortness of breath and wheezing  Gastrointestinal: Negative for abdominal distention and vomiting  Musculoskeletal: Positive for gait problem  Neurological: Positive for weakness (Generalized)  Psychiatric/Behavioral: Positive for confusion  Negative for agitation (No episodes of any irritability or agitation)         Past Medical History:   Diagnosis Date   • Arthritis    • Blood pressure instability UNKNOWN   • Bowel obstruction (HCC)     intestine   • Bradycardia    • Cerebral palsy (HCC)    • Chronic edema    • Closed fracture of coccyx ([facility] )    • DVT (deep venous thrombosis) ([facility] )     US Doppler done 10/2015 and 3/2016   • GERD (gastroesophageal reflux disease)    • History of Doppler ultrasound     of UE and LE; 10/2015 and 3/2016 DVT   • History of echocardiogram 03/2016    EF 55%  Trace mitral and tricuspid regurgitation  PA systolic pressure 33  Echo Doppler 2/2017- EF 60%  • History of EKG 10/20/2016    Normal sinus rhythm with sinus arrhythmia  Abnormal ECG    • History of Holter monitoring 11/2017    14 DAY MONITOR Baseline rhythm was of sinus origin with rare APCs and VPCs and a few baseline motion artifact  NO OTHER ARRHYTHMIAS  No symptoms was reported in the attached diary  • History of Holter monitoring 10/2016    Sinus rhythm  There were a few episodes of asymptomatic sinus arrhythmia  There was sinus bradycardia at rate as low as 47 bpm  There were rare single PACs and PVCs      • Hypertension    • Hyperthyroidism    • Intellectual disability    • Kidney stone    • Lymphedema    • Pneumonia    • Stroke ([facility] )     UNCERTAIN OF TYPE/NON VERBAL   • Tachycardia    • Thyroid disease    • Urinary incontinence    • Urinary tract infection        Past Surgical History:   Procedure Laterality Date   • ABDOMINAL HYSTERECTOMY     • COLONOSCOPY     • EYE SURGERY     • HYSTERECTOMY     • HYSTERECTOMY     • IR DRAINAGE TUBE PLACEMENT  6/12/2020   • SMALL INTESTINE SURGERY     • US GUIDED VASCULAR ACCESS  1/18/2017       Social History     Tobacco Use   Smoking Status Never   Smokeless Tobacco Never       Family History   Problem Relation Age of Onset   • Cancer Father    • Colon cancer Father    • Asthma Mother    • Stroke Mother    • WESTLEY disease Mother    • Arthritis Mother    • Diabetes type II Mother    • Mitral valve prolapse Mother    • Irritable bowel syndrome Mother    • Heart disease Mother         Allergies   Allergen Reactions   • Bethanechol      Reaction Date: 10Dec2007; SEIZURES     • Metoclopramide      Reaction Date: [id number]; SEIZURES     • Sulfamethoxazole-Trimethoprim      Reaction Date: 10Dec2007; SEIZURES           Current Outpatient Medications:   •  acetaminophen (TYLENOL) 325 mg tablet, Take 650 mg by mouth 2 (two) times a day, Disp: , Rfl:   •  amLODIPine (NORVASC) 2 5 mg tablet, Take 2 5 mg by mouth daily GIVE FOR SBP >130, Disp: , Rfl:   •  aspirin 81 mg chewable tablet, Chew 1 tablet (81 mg total) daily for 7 days, Disp: 7 tablet, Rfl: 0  •  Balsam [place]-Castor Oil (Venelex) OINT, Apply 1 application topically 2 (two) times a day for 7 days  - apply to buttocks in AM + PM until resolved, Disp: 30 g, Rfl: 1  •  bisacodyl (DULCOLAX) 10 mg suppository, Insert 10 mg into the rectum daily as needed, Disp: , Rfl:   •  cholecalciferol (VITAMIN D3) 1,000 units tablet, Take 1,000 Units by mouth daily, Disp: , Rfl:   •  DULoxetine (CYMBALTA) 30 mg delayed release capsule, Take 30 mg by mouth daily FOR FIBROMYALGIA, Disp: , Rfl:   •  Garlic 8197 MG CAPS, Take 1 capsule by mouth daily, Disp: , Rfl:   •  lansoprazole (PREVACID) 30 mg capsule, TAKE 1 CAPSULE BY MOUTH EVERY DAY, Disp: 90 capsule, Rfl: 3  •  Levothyroxine Sodium 112 MCG CAPS, Take 88 mcg by mouth daily, Disp: , Rfl:   •  Linzess 145 MCG CAPS, TAKE 1 CAPSULE BY MOUTH EVERY DAY, Disp: 90 capsule, Rfl: 1  •  magnesium hydroxide (MILK OF MAGNESIA) 400 mg/5 mL oral suspension, Take 30 mL by mouth daily as needed, Disp: , Rfl:   •  meloxicam (MOBIC) 7 5 mg tablet, Take 7 5 mg by mouth daily, Disp: , Rfl:   •  methocarbamol (ROBAXIN) 500 mg tablet, Take 500 mg by mouth every 6 (six) hours as needed Muscle spasms, Disp: , Rfl:   •  senna (SENOKOT) 8 6 MG tablet, Take 1 tablet by mouth daily, Disp: , Rfl:   •  Sodium Phosphates (FLEET ENEMA RE), Insert 1 Bottle into the rectum daily as needed, Disp: , Rfl:   •  valsartan (DIOVAN) 320 MG tablet, Take 240 mg by mouth daily PATIENT IS CURRENTLY  MG DAILY AT [facility] , Disp: , Rfl:     Updated list was reviewed in [facility] of facility  /64, HR 65, wt118 LBS, RR 18, Sao2 98%    Physical Exam  Constitutional:       General: She is not in acute distress  HENT:      Head: Normocephalic and atraumatic  Eyes:      General: No scleral icterus  Cardiovascular:      Rate and Rhythm: Normal rate and regular rhythm  Pulmonary:      Breath sounds: No wheezing or rales  Abdominal:      General: There is no distension  Palpations: Abdomen is soft  Tenderness: There is no abdominal tenderness  There is no guarding  Musculoskeletal:         General: Deformity (Contractures all four extremeties) present  Cervical back: Neck supple  Comments: Pt has chronic edema involving dorsum of feet bilaterally  Skin:     Coloration: Skin is pale  Skin is not jaundiced  Neurological:      Mental Status: She is disoriented  Cranial Nerves: No cranial nerve deficit  Motor: Weakness (Chronic weakness involving all extremities  Contractures of knees bilateral upper extremities ) present  Diagnostic Data:  TSH level was normal at 0 61 on 3/3/23  2/16  BUN 20, creatinine   91, Na 141, K 4 8  HGB 11 1, WBC 5 2, Plt 269    Care coordinated with patient's mother on the phone    This note was electronically signed by Dr Gail Klein

## 2023-04-28 ENCOUNTER — NURSING HOME VISIT (OUTPATIENT)
Dept: GERIATRICS | Facility: OTHER | Age: 58
End: 2023-04-28

## 2023-04-28 DIAGNOSIS — M79.7 FIBROMYALGIA: ICD-10-CM

## 2023-04-28 DIAGNOSIS — K21.9 GASTROESOPHAGEAL REFLUX DISEASE, UNSPECIFIED WHETHER ESOPHAGITIS PRESENT: Primary | ICD-10-CM

## 2023-04-28 DIAGNOSIS — R53.81 PHYSICAL DECONDITIONING: ICD-10-CM

## 2023-04-28 DIAGNOSIS — I10 ESSENTIAL HYPERTENSION: ICD-10-CM

## 2023-04-28 DIAGNOSIS — R26.2 AMBULATORY DYSFUNCTION: ICD-10-CM

## 2023-04-28 DIAGNOSIS — G80.9 CEREBRAL PALSY, UNSPECIFIED TYPE (HCC): ICD-10-CM

## 2023-04-28 DIAGNOSIS — E03.9 HYPOTHYROIDISM, UNSPECIFIED TYPE: ICD-10-CM

## 2023-04-29 NOTE — PROGRESS NOTES
Facility: [facility]  POS: [id number]  Progress Note    Chief Complaint/Reason for visit: LTC monthly f/u    Patient's care was coordinated with nursing facility staff  Recent vitals, labs, and updated medications were review on [facility] system in facility  Assessment/Plan:    GERD (gastroesophageal reflux disease)  Stable  Continue Lansoprazole 30 mg daily     Hypothyroidism  TSH 0 61 on 3/3/23  Stable   C/w Levothyroxine 88 MCG daily  Repeat TSH in 6 - 1 year     Essential hypertension  Blood pressure stable   Bp (98s-248608ha / 60s-72s) BP /72  C/w  valsartan 240 mg daily and amlodipine 2 5 mg daily   Avoid hypotension and tight control in this debilitated pt   Encourage low-sodium diet   Continue to monitor blood pressure and adjust medication as needed    Cerebral palsy ([address] )  With known history   +UE LE contractures   Dependent for all ADLS/IADLs  B/B incontinent  Sling zane to recliner  Continue 24/7 supportive care     Ambulatory dysfunction  PT/OT as needed per facility   Continue fall and safety precaution   Dependent for ADLs/IADLs  Continue with supportive care   PT/OT/SL/SW/DT/ following    Fibromyalgia  Non verbal sign of pain absent  C/w Duloxetine 30 mg daily and Tylenol 650 q6 hrs prn   Remains on robaxin 500 q6hrs prn  Continue supportive care     Physical deconditioning  Multifactorial in the setting of acute and chronic medical conditions   Patient with known history of cerebral palsy  Noted UE/LE contractures and rigidity  Bed bound   Continue restorative services   Continue to implement fall and safety precaution   Continue with pressure offloading and assist  Encourage adequate oral hydration and nutrition, continue with daily multivitamins and supplements   Continue with 24-7 supportive    History of Present Illness: YADY Diana He is a 57-year old female resident of [facility]  She is being seen today for her LTC monthly f/u   Received patient sitting in recliner, resting  Appeared comfortable and in no acute distress  Pleasantly confused  Poor historian  Noted with B/L LE/UE contractures  Dependent for all ADLs/IADLs  Incontinent of B/B  Bed bound  Appears weak and frail  Nursing continues to assist with feeds  Per chart review appetite is at her baseline  Pt consumes around % for most of her meals  She sleeps well at night  Vital sign has been stable  No behaviors reported by nursing staff  Per nursing no /GI discomfort  Mom visits every day and is active in pt's care plan  Per nursing no other issues with cough, fever, diarrhea or constipation  Per nursing no other acute concerns or issues at this time  Past Medical History: unchanged from history and physical  Past Medical History:   Diagnosis Date    Arthritis     Blood pressure instability UNKNOWN    Bowel obstruction (HCC)     intestine    Bradycardia     Cerebral palsy (HCC)     Chronic edema     Closed fracture of coccyx (HCC)     DVT (deep [facility] thrombosis) ([facility] [address] )     US Doppler done 10/2015 and 3/2016    GERD (gastroesophageal reflux disease)     History of Doppler ultrasound     of UE and LE; 10/2015 and 3/2016 DVT    History of echocardiogram 03/2016    EF 55%  Trace mitral and tricuspid regurgitation  PA systolic pressure 33  Echo Doppler 2/2017- EF 60%   History of EKG 10/20/2016    Normal sinus rhythm with sinus arrhythmia  Abnormal ECG     History of Holter monitoring 11/2017    14 DAY MONITOR Baseline rhythm was of sinus origin with rare APCs and VPCs and a few baseline motion artifact  NO OTHER ARRHYTHMIAS  No symptoms was reported in the attached diary   History of Holter monitoring 10/2016    Sinus rhythm  There were a few episodes of asymptomatic sinus arrhythmia  There was sinus bradycardia at rate as low as 47 bpm  There were rare single PACs and PVCs       Hypertension     Hyperthyroidism     Intellectual disability     Kidney stone     Lymphedema  Pneumonia     Stroke [address] [address] )     UNCERTAIN OF TYPE/NON VERBAL    Tachycardia     Thyroid disease     Urinary incontinence     Urinary tract infection      Family History: unchanged from history and physical  Social History: unchanged from history and physical  Resident Since:   Review of systems: Review of Systems   Reason unable to perform ROS: non verbal      Medications: All medication and routine orders were reviewed and updated  Allergies: Reviewed and unchanged  Consults reviewed:PT, OT, Speech and Nutrition  Labs/Diagnostics (reviewed by this provider): Copy in Chart reviewed from [facility]    Physical Exam    Weight: 119 2 lbs Temp:97 9 BP:124/72  Pulse:80   Resp:20 O2 Sat:97% RA    Constitutional: Cachetic  Orientation:Person   Physical Exam  Constitutional:       General: She is not in acute distress  Appearance: She is not ill-appearing  Comments: Chronically ill-appearing   HENT:      Head: Normocephalic and atraumatic  Nose: No rhinorrhea  Mouth/Throat:      Mouth: Mucous membranes are moist    Eyes:      General:         Right eye: No discharge  Left eye: Discharge present  Cardiovascular:      Rate and Rhythm: Normal rate and regular rhythm  Pulses: Normal pulses  Heart sounds: Normal heart sounds  Pulmonary:      Effort: Pulmonary effort is normal       Comments: B/L Diminished  Abdominal:      General: Bowel sounds are normal  There is no distension  Palpations: Abdomen is soft  Tenderness: There is no abdominal tenderness  There is no guarding  Genitourinary:     Comments: Incontinent of bowel and bladder  Musculoskeletal:         General: Deformity present  Cervical back: Normal range of motion and neck supple  Right lower leg: No edema  Left lower leg: No edema  Comments: UE/LE contractures   Skin:     General: Skin is warm  Coloration: Skin is not jaundiced  Findings: No erythema     Neurological:      Mental Status: She is disoriented  Cranial Nerves: No cranial nerve deficit  Motor: Weakness (generalized) present  Gait: Gait abnormal       Comments: Nonverbal, h/o CP     This note was completed in part utilizing [employer] fluency direct voice recognition software  Grammatical errors, random word insertion, spelling mistakes, and incomplete sentences may be an occasional consequence of the system secondary to software limitations, ambient noise and hardware issues  At the time of dictation, efforts were made to edit, clarify and/or correct errors  Please read the chart carefully and recognize, using context, where substitutions have occurred  If you have any questions or concerns about the context, text or information contained within the body of this dictation, please contact myself, the provider, for further clarification      [email or web address]   8/20/552450:12 PM

## 2023-05-02 NOTE — ASSESSMENT & PLAN NOTE
Multifactorial in the setting of acute and chronic medical conditions   Patient with known history of cerebral palsy  Noted UE/LE contractures and rigidity  Bed bound   Continue restorative services   Continue to implement fall and safety precaution   Continue with pressure offloading and assist  Encourage adequate oral hydration and nutrition, continue with daily multivitamins and supplements   Continue with 24-7 supportive Detail Level: Generalized Patient Specific Counseling (Will Not Stick From Patient To Patient): She has a decades long Hx of generalized pruritus. She is clear that there is never a rash associated. She has been evaluated, and sees her PCP regularly. Thyroid is regulated, no Hx of anemia. I see little likelihood of yield from repeating workup. I did explain that sedating antihistamines have been linked to an increased risk of dementia, and suggested she tries cetirizine as an alternative. However she is adamant that she is willing to take the risks in order to control the itch and I understand her position and will refill the cyprohepatdine. Detail Level: Detailed Detail Level: Zone

## 2023-05-02 NOTE — ASSESSMENT & PLAN NOTE
Non verbal sign of pain absent  C/w Duloxetine 30 mg daily and Tylenol 650 q6 hrs prn   Remains on robaxin 500 q6hrs prn  Continue supportive care

## 2023-05-02 NOTE — ASSESSMENT & PLAN NOTE
Blood pressure stable   Bp (98s-284449cn / 60s-72s) BP /72     C/w  valsartan 240 mg daily and amlodipine 2 5 mg daily   Avoid hypotension and tight control in this debilitated pt   Encourage low-sodium diet   Continue to monitor blood pressure and adjust medication as needed

## 2023-05-02 NOTE — ASSESSMENT & PLAN NOTE
With known history   +UE LE contractures   Dependent for all ADLS/IADLs  B/B incontinent   Sling zane to recliner  Continue 24/7 supportive care

## 2023-05-05 NOTE — ASSESSMENT & PLAN NOTE
-- DO NOT REPLY / DO NOT REPLY ALL --  -- Message is from Engagement Center Operations (ECO) --    ONLY TO BE USED WITHIN A REFILL MEDICATION ENCOUNTER    Med Refill  Is the patient currently having any symptoms?: No/Non-Emergent symptoms    Name of medication requested: See pended med    Has patient contacted the pharmacy? Yes    Is this the first request for the medication in the last 48 hours?: Yes    Patient is requesting a medication refill - medication is on active medication list    Patient is currently OUT of the requested medication - sent as HIGH priority      Full name of the provider who ordered the medication: link magdaleno    [facility] site name / Account # for provider: [email or web address]    Preferred Pharmacy: Pharmacy  [id number] - [place], [place] - [address]    Patient confirmed the above pharmacy as correct?  Yes      Caller Information       Type Contact Phone/Fax    05/05/2023 03:45 PM CDT Phone (Incoming) [email or web address] (Self) [phone] (M)          Alternative phone number: none    Can a detailed message be left?: Yes    Patient is completely out of medication: Verify if patient is currently experiencing symptoms. If patient is symptomatic, proceed with front end triage instead of medication refill. If patient is not symptomatic but is completely out of medication, tapan as High priority when routing. Inform patient: “Please call back with any questions or concerns and if your condition becomes life threatening, you should seek immediate medical assistance by calling 911 or going to the Emergency Department for evaluation.”    Inform all patients: \"If the clinical team needs to contact you regarding this refill, please be aware the return phone call may come from an unidentified or out of state phone number and your refill request will be addressed as soon as the clinical team reviews your message.\"   Remains on pureed diet with nectar thick liquids  Patient is a complete feed  Continue aspiration precaution per facility   Speech therapy Consul as needed

## 2023-05-22 ENCOUNTER — NURSING HOME VISIT (OUTPATIENT)
Dept: GERIATRICS | Facility: OTHER | Age: 58
End: 2023-05-22

## 2023-05-22 VITALS
WEIGHT: 121.8 LBS | DIASTOLIC BLOOD PRESSURE: 86 MMHG | SYSTOLIC BLOOD PRESSURE: 129 MMHG | HEART RATE: 83 BPM | BODY MASS INDEX: 23.79 KG/M2 | RESPIRATION RATE: 16 BRPM | OXYGEN SATURATION: 93 % | TEMPERATURE: 97.6 F

## 2023-05-22 DIAGNOSIS — J40 BRONCHITIS: Primary | ICD-10-CM

## 2023-05-22 NOTE — ASSESSMENT & PLAN NOTE
Patient was noted to have increased chest congestion with coughing spells during last few days  She was started on prednisone and nebulized treatments on 5/19  Chest x-ray reveals increased markings in left perihilar region ? pneumonia  Per records patient's mother has been giving her thin liquids  Patient remains at increased risk for aspiration  F U with  speech therapis Start Azithromycin

## 2023-05-22 NOTE — PROGRESS NOTES
[facility]  [address]   [address] [place], [place], [address]  48     Progress Note  Code [facility]    Patient Location     [address] rehab    Reason for visit     F u chest congestion, Abnormal chest Xray    Patient’s care was coordinated with nursing facility staff  Recent vitals, labs and updated medications were reviewed on [employer] system of facility  Problem List Items Addressed This Visit        Respiratory    Bronchitis - Primary     Patient was noted to have increased chest congestion with coughing spells during last few days  She was started on prednisone and nebulized treatments on 5/19  Chest x-ray reveals increased markings in left perihilar region ? pneumonia  Per records patient's mother has been giving her thin liquids  Patient remains at increased risk for aspiration  F U with  speech therapis Start Azithromycin            Essential hypertension:  Patient was recently taken off of amlodipine  Valsartan dose was reduced from 240 to 160 mg daily for borderline low blood pressures  Blood pressure is currently stable at 129/86  Continue to monitor    Chronic constipation:  Continue Linzess    GERD:  Stable on lansoprazole    Cerebral palsy:  Continue supportive care    HPI       Pt is being seen for follow up of increase chest congestion and coughing spells since last few days  She was started on Prednisone and nebulizer treatments on 5/19  CXR revealed increase markings involving left perihilar region ? Pneumonia  Pt is unable to provide any history due to cerebral palsy  No fever chills  Sao2 stable at 93 % on RA      Review of Systems   Constitutional: Negative for chills and fever  Respiratory: Positive for cough and wheezing  Negative for shortness of breath and stridor  Gastrointestinal: Negative for abdominal distention and vomiting  Genitourinary: Negative for hematuria  Musculoskeletal: Positive for gait problem  Neurological: Negative for seizures and weakness  Psychiatric/Behavioral: Positive for confusion  Past Medical History:   Diagnosis Date   • Arthritis    • Blood pressure instability UNKNOWN   • Bowel obstruction (HCC)     intestine   • Bradycardia    • Cerebral palsy [facility])    • Chronic edema    • Closed fracture of coccyx (HCC)    • D[facility] thrombosis) ([facility] )     US Doppler done 10/2015 and 3/2016   • GERD (gastroesophageal reflux disease)    • History of Doppler ultrasound     of UE and LE; 10/2015 and 3/2016 DVT   • History of echocardiogram 03/2016    EF 55%  Trace mitral and tricuspid regurgitation  PA systolic pressure 33  Echo Doppler 2/2017- EF 60%  • History of EKG 10/20/2016    Normal sinus rhythm with sinus arrhythmia  Abnormal ECG    • History of Holter monitoring 11/2017    14 DAY MONITOR Baseline rhythm was of sinus origin with rare APCs and VPCs and a few baseline motion artifact  NO OTHER ARRHYTHMIAS  No symptoms was reported in the attached diary  • History of Holter monitoring 10/2016    Sinus rhythm  There were a few episodes of asymptomatic sinus arrhythmia  There was sinus bradycardia at rate as low as 47 bpm  There were rare single PACs and PVCs      • Hypertension    • Hyperthyroidism    • Intellectual disability    • Kidney stone    • Lymphedema    • Pneumonia    • Stroke ([facility] )     UNCERTAIN OF TYPE/NON VERBAL   • Tachycardia    • Thyroid disease    • Urinary incontinence    • Urinary tract infection        Past Surgical History:   Procedure Laterality Date   • ABDOMINAL HYSTERECTOMY     • COLONOSCOPY     • EYE SURGERY     • HYSTERECTOMY     • HYSTERECTOMY     • IR DRAINAGE TUBE PLACEMENT  6/12/2020   • SMALL INTESTINE SURGERY     • US GUIDED VASCULAR ACCESS  1/18/2017       Social History     Tobacco Use   Smoking Status Never   Smokeless Tobacco Never       Family History   Problem Relation Age of Onset   • Cancer Father    • Colon cancer Father    • Asthma Mother • Stroke Mother    • WESTLEY disease Mother    • Arthritis Mother    • Diabetes type II Mother    • Mitral valve prolapse Mother    • Irritable bowel syndrome Mother    • Heart disease Mother         Allergies   Allergen Reactions   • Bethanechol      Reaction Date: 10Dec2007; SEIZURES     • Metoclopramide      Reaction Date: 10Dec2007; SEIZURES     • Sulfamethoxazole-Trimethoprim      Reaction Date: 10Dec2007; SEIZURES           Current Outpatient Medications:   •  acetaminophen (TYLENOL) 325 mg tablet, Take 650 mg by mouth 2 (two) times a day, Disp: , Rfl:   •  amLODIPine (NORVASC) 2 5 mg tablet, Take 2 5 mg by mouth daily GIVE FOR SBP >130, Disp: , Rfl:   •  aspirin 81 mg chewable tablet, Chew 1 tablet (81 mg total) daily for 7 days, Disp: 7 tablet, Rfl: 0  •  Balsam [place]-Castor Oil (Venelex) OINT, Apply 1 application topically 2 (two) times a day for 7 days  - apply to buttocks in AM + PM until resolved, Disp: 30 g, Rfl: 1  •  bisacodyl (DULCOLAX) 10 mg suppository, Insert 10 mg into the rectum daily as needed, Disp: , Rfl:   •  cholecalciferol (VITAMIN D3) 1,000 units tablet, Take 1,000 Units by mouth daily, Disp: , Rfl:   •  DULoxetine (CYMBALTA) 30 mg delayed release capsule, Take 30 mg by mouth daily FOR FIBROMYALGIA, Disp: , Rfl:   •  Garlic 7958 MG CAPS, Take 1 capsule by mouth daily, Disp: , Rfl:   •  lansoprazole (PREVACID) 30 mg capsule, TAKE 1 CAPSULE BY MOUTH EVERY DAY, Disp: 90 capsule, Rfl: 3  •  Levothyroxine Sodium 112 MCG CAPS, Take 88 mcg by mouth daily, Disp: , Rfl:   •  Linzess 145 MCG CAPS, TAKE 1 CAPSULE BY MOUTH EVERY DAY, Disp: 90 capsule, Rfl: 1  •  magnesium hydroxide (MILK OF MAGNESIA) 400 mg/5 mL oral suspension, Take 30 mL by mouth daily as needed, Disp: , Rfl:   •  methocarbamol (ROBAXIN) 500 mg tablet, Take 500 mg by mouth every 6 (six) hours as needed Muscle spasms, Disp: , Rfl:   •  senna (SENOKOT) 8 6 MG tablet, Take 1 tablet by mouth daily, Disp: , Rfl:   •  Sodium Phosphates "(FLEET ENEMA RE), Insert 1 Bottle into the rectum daily as needed, Disp: , Rfl:   •  valsartan (DIOVAN) 320 MG tablet, Take 240 mg by mouth daily PATIENT IS CURRENTLY  MG DAILY AT [facility] , Disp: , Rfl:     Updated list was reviewed in [facility] of facility  Vitals:    05/22/23 1012   BP: 129/86   Pulse: 83   Resp: 16   Temp: 97 6 °F (36 4 °C)   SpO2: 93%       Physical Exam  Constitutional:       General: She is not in acute distress  HENT:      Head: Normocephalic and atraumatic  Eyes:      General: No scleral icterus  Cardiovascular:      Rate and Rhythm: Normal rate and regular rhythm  Pulmonary:      Breath sounds: No wheezing  Comments: Decrease BS  Abdominal:      Palpations: Abdomen is soft  Tenderness: There is no abdominal tenderness  There is no guarding  Musculoskeletal:         General: Deformity (Contractures of extremities from CP) present  Cervical back: Neck supple  Comments: Chronic edema involving dorsum of feet b/l   Skin:     Coloration: Skin is not jaundiced  Neurological:      Mental Status: She is disoriented  Cranial Nerves: No cranial nerve deficit  Motor: Weakness (Chronic involving extremities from Cerebral Palsy) present  Psychiatric:         Mood and Affect: Mood normal          Diagnostic Data:    Chest x-ray from 5/19 mild increase in left perihilar markings which could represent pneumonia    CBC, BMP due in am    Attempted to call the mother  Could not reach at this time  Left a message  Portions of the record may have been created with voice recognition software  Occasional wrong word or \"sound a like\" substitutions may have occurred due to the inherent limitations of voice recognition software  Read the chart carefully and recognize, using context, where substitutions have occurred      This note was electronically signed by Dr Vickey Falk   "

## 2023-05-24 ENCOUNTER — CONSULT (OUTPATIENT)
Dept: CARDIOLOGY CLINIC | Facility: SKILLED NURSING FACILITY | Age: 58
End: 2023-05-24

## 2023-05-24 DIAGNOSIS — R79.9 ELEVATED BUN: ICD-10-CM

## 2023-05-24 DIAGNOSIS — I10 ESSENTIAL HYPERTENSION: ICD-10-CM

## 2023-05-24 DIAGNOSIS — G80.9 CEREBRAL PALSY, UNSPECIFIED TYPE (HCC): Primary | ICD-10-CM

## 2023-05-24 NOTE — PROGRESS NOTES
[facility] Consultation - Cardiology     Farrah Ríos 61 y o  female MRN: [id number]        DATE: 5/24/2023  TIME: 1:50 PM    Cardiology Problem list:  Essential hypertension  Cerebral palsy    Assessment and plan:    Essential hypertension  Recent blood pressures reviewed  Blood pressures have been trending fairly low  Amlodipine was stopped recently  Valsartan dose reduced to 160 mg daily due to relative hypotension  Continue ongoing blood pressure monitoring  Low-sodium diet  Recent labs show prerenal picture: BUN 38, creatinine 1  Potassium 5 1  If systolic blood pressure continues to be below 110, valsartan dose can be increased further to 80 mg a day  Cerebral palsy  Continue supportive care  Keep hydrated  Discussed plan with primary team         Chief complaint:       HPI:    Farrah Ríos is a 61y o -year-old female who is seen in the [address] [place] [address] at bedside for initial cardiology consultation  Patient was seen in the activity room along with patient's mother at her side  She is unable to provide any history  History provided by the mother  She remains mentally and physically incapacitated due to her cerebral palsy  She is dependent on the staff for ADLs  Cardiology consulted due to recent labile hypertension  Blood pressure has been trending low over the last couple of weeks  No syncope documented  Amlodipine was discontinued recently  Valsartan dose also was decreased  Blood pressure now between 269-6 20 systolic  Patient's care was coordinated with nursing facility staff  Recent vitals, labs, and updated medications were review on [facility] system in facility  Past history, family history, social history, current medications, vital signs, recent lab and imaging studies and  prior cardiology studies reviewed independently on this visit  Nursing home medication list and medical records independently reviewed  Findings discussed with nursing home staff  Other consultant's notes independently reviewed during this visit  ALLERGIES:  Allergies   Allergen Reactions   • Bethanechol      Reaction Date: 10Dec2007; SEIZURES     • Metoclopramide      Reaction Date: 10Dec2007; SEIZURES     • Sulfamethoxazole-Trimethoprim      Reaction Date: 10Dec2007; SEIZURES         Review of Systems   Unable to perform ROS: Other   Cerebral palsy  Historical Information   Past Medical History:   Diagnosis Date   • Arthritis    • Blood pressure instability UNKNOWN   • Bowel obstruction (HCC)     intestine   • Bradycardia    • Cerebral palsy ([facility] )    • Chronic edema    • Closed fracture of coccyx (HCC)    • DVT (deep venous thrombosis) ([facility] )     US Doppler done 10/2015 and 3/2016   • GERD (gastroesophageal reflux disease)    • History of Doppler ultrasound     of UE and LE; 10/2015 and 3/2016 DVT   • History of echocardiogram 03/2016    EF 55%  Trace mitral and tricuspid regurgitation  PA systolic pressure 33  Echo Doppler 2/2017- EF 60%  • History of EKG 10/20/2016    Normal sinus rhythm with sinus arrhythmia  Abnormal ECG    • History of Holter monitoring 11/2017    14 DAY MONITOR Baseline rhythm was of sinus origin with rare APCs and VPCs and a few baseline motion artifact  NO OTHER ARRHYTHMIAS  No symptoms was reported in the attached diary  • History of Holter monitoring 10/2016    Sinus rhythm  There were a few episodes of asymptomatic sinus arrhythmia  There was sinus bradycardia at rate as low as 47 bpm  There were rare single PACs and PVCs      • Hypertension    • Hyperthyroidism    • Intellectual disability    • Kidney stone    • Lymphedema    • Pneumonia    • Stroke ([facility] )     UNCERTAIN OF TYPE/NON VERBAL   • Tachycardia    • Thyroid disease    • Urinary incontinence    • Urinary tract infection      Past Surgical History:   Procedure Laterality Date   • ABDOMINAL HYSTERECTOMY     • COLONOSCOPY     • EYE SURGERY     • "HYSTERECTOMY     • HYSTERECTOMY     • IR DRAINAGE TUBE PLACEMENT  6/12/2020   • SMALL INTESTINE SURGERY     • US GUIDED VASCULAR ACCESS  1/18/2017     Family History   Problem Relation Age of Onset   • Cancer Father    • Colon cancer Father    • Asthma Mother    • Stroke Mother    • WESTLEY disease Mother    • Arthritis Mother    • Diabetes type II Mother    • Mitral valve prolapse Mother    • Irritable bowel syndrome Mother    • Heart disease Mother      Social History   reports that she has never smoked  She has never used smokeless tobacco  She reports that she does not drink alcohol and does not use drugs  Objective: There were no vitals filed for this visit  Wt Readings from Last 3 Encounters:   05/22/23 55 2 kg (121 lb 12 8 oz)   03/31/23 53 5 kg (118 lb)   01/27/23 57 7 kg (127 lb 4 8 oz)     Pulse Readings from Last 3 Encounters:   05/22/23 83   03/31/23 65   01/27/23 80     BP Readings from Last 3 Encounters:   05/22/23 129/86   03/31/23 108/64   01/27/23 109/60       Physical Exam    Gen: Laying in the recliner, no acute cardiopulmonary distress  Eyes:  no icterus  Neck: Mild stiffness  Heart: regular rhythm, distant S1 and S2  Lungs: No wheezing or crackles  Abdomen: normal bowel sounds  Extremities: Trace pedal edema  Joints: Deformities and contractures   neuro: Chronic weakness from cerebral palsy  Skin: warm, no obvious lesions  Psych: Unable to assess    Pertinent Laboratory/Diagnostic Studies:    Laboratory studies reviewed personally by Colin Garzon MD        Imaging Studies:     All pertinent cardiac studies, imaging studies were personally reviewed and results summarized  Visit diagnoses:  1  Cerebral palsy, unspecified type ([address] )        2  Elevated BUN        3  Essential hypertension              Cristopher Capps MD, [facility]    Portions of the record may have been created with voice recognition software    Occasional wrong word or \"sound a like\" substitutions may have occurred due to the " inherent limitations of voice recognition software  Read the chart carefully and recognize, using context, where substitutions have occurred  If you have any questions or concerns about the context, text or information contained within the body of this dictation, please contact myself, the provider, for further clarification

## 2023-05-26 ENCOUNTER — NURSING HOME VISIT (OUTPATIENT)
Dept: GERIATRICS | Facility: OTHER | Age: 58
End: 2023-05-26

## 2023-05-26 DIAGNOSIS — R26.2 AMBULATORY DYSFUNCTION: ICD-10-CM

## 2023-05-26 DIAGNOSIS — G80.9 CEREBRAL PALSY, UNSPECIFIED TYPE (HCC): ICD-10-CM

## 2023-05-26 DIAGNOSIS — K59.09 CHRONIC CONSTIPATION: ICD-10-CM

## 2023-05-26 DIAGNOSIS — I10 ESSENTIAL HYPERTENSION: ICD-10-CM

## 2023-05-26 DIAGNOSIS — J40 BRONCHITIS: Primary | ICD-10-CM

## 2023-05-26 DIAGNOSIS — K21.9 GASTROESOPHAGEAL REFLUX DISEASE, UNSPECIFIED WHETHER ESOPHAGITIS PRESENT: ICD-10-CM

## 2023-05-27 NOTE — PROGRESS NOTES
[address], [address] [place] [address], [address] [place] [address]  [phone]    NAME: Olayinka Mcgill  AGE: 61 y o  SEX: female    Progress Note  LTC 28    Location: [facility]   POS: 32 (SNF)     Patient's care was coordinated with nursing facility staff  Recent vitals, labs, and updated medications were review on [facility] system in facility  Assessment/Plan:    Bronchitis  Noted to have increased chest congestion accompanied with cough spells  Chest xray showed increased making in left perihilar region ? Pneumonia  Remains on prednisone, nebulizer tx and course of 5 days of Azithromycin 500 mg to be completed on 5/27/23  Patient improving with less coughing spells  Remains at increased risk for aspiration  Pt on puree texture and nectar consistency diet  Per records mother has been giving thin liquids  Mother has been counseled various times by staff  Respiratory status stable on RA  Lungs diminished with exp wheezes  C/w prednisone, nebs and azithromycin  Also as needed alexa dang   Speech therapist is following pt     Essential hypertension  BP log reviewed and stable  /76  BP trending between 105s/140s / 63s-88s 5/14-5/26/23  Recently Valsartan reduced to 160 mg daily  Avoid hypotension      Chronic constipation  With known history   C/w Linzess 145 mcg daily     Ambulatory dysfunction  PT/OT as needed per facility   Continue fall and safety precaution   Dependent for ADLs/IADLs  Continue with supportive care   PT/OT/SL/SW/DT/ following    GERD (gastroesophageal reflux disease)  Stable  Continue Lansoprazole 30 mg daily     Cerebral palsy ([facility] )  With known history   +UE LE contractures   Dependent for all ADLS/IADLs  B/B incontinent  Sling zane to recliner  Continue 24/7 supportive care     Chief complaint / Reason for visit: LTC Acute Visit     History of Present Illness:  Patient is a 62-year old female seen and examined for LTC acute visit   Received patient lying in recliner, resting  Mom at bedside  At the time of my evaluation patient appear ok  Recent chest xray on 5/19/23 remarkable for increased markings in left perihilar region ? pneumonia  Remains with residual cough  Remains on prednisone and nebulizer treatments with relief  Pt needs full assistance from staff to maintain nebulizer tx on nose/mouth  Pt on puree texture and nectar consistency diet  Per records patient's mother has been giving thin liquids  Pt remains at increase risk for aspiration which has been discussed with mother on various occassions  Speech therapist following patient  Remains on azithromycin 500 mg daily for 5 days (end of tx on 5/27/23)  Pt requires full assistance with ADLs/IADLs  Skyler lift  B/B incontinent  + UE contractures  Left foot 1+ edema and right with trace edema  No other concerns or issues at this time  Review of Systems:  Per history of present illness, all other systems reviewed and negative  Other than those noted in HPI    HISTORY:  Medical Hx: Reviewed, unchanged  Family Hx: Reviewed, unchanged  Soc Hx: Reviewed,  unchanged    ALLERGY: Reviewed, unchanged  Allergies   Allergen Reactions   • Bethanechol      Reaction Date: 10Dec2007; SEIZURES     • Metoclopramide      Reaction Date: 10Dec2007; SEIZURES     • Sulfamethoxazole-Trimethoprim      Reaction Date: 10Dec2007; SEIZURES        PHYSICAL EXAM:  Visit Vitals  /76   Pulse 81   Temp 97 9 °F (36 6 °C)   Resp 20   Wt 55 2 kg (121 lb 12 8 oz)   SpO2 97% Comment: at rest RA   BMI 23 79 kg/m²   OB Status Hysterectomy   Smoking Status Never   BSA 1 51 m²     General: frail and weak   Head: Atraumatic  Normocephalic  Eye Exam: anicteric sclera, no discharge, PERRLA, No injection  Oral Exam: moist mucous membranes, no buccaloropharyngeal erythema, palatine tonsils WNL   Cough   Neck Exam: no anterior cervical lymphadenopathy noted, neck supple  Cardiovascular: regular rate, regular rhythm, no murmurs, rubs, or "gallops  Pulmonary: breath sounds diminished with + expiratory wheeze, no rhonchi, no rales  No chest tenderness  Abdominal: soft, non-tender, nondistended, bowel sounds audible x 4 quadrants  : Non distended bladder  B/B incontinent   Extremities and skin: left foot 1+ and right trace edema noted, no rashes  +UE contractures   Neurological: alert  Confused  moving all 4 extremities symmetrically    Laboratory results / Imaging reviewed: Hard copy/ies in medical chart: reviewed from [facility]     5/23/23  Wbc 6 0, hgb 10 3, hct 34, platelets 650, BUN 38, Creatinine 1 0, sodium 145, potassium 5 1, calcium 10 0, GFR 57    Current Medications: All medications reviewed and updated in Nursing Home Chart    Please note:  Voice-recognition software may have been used in the preparation of this document  Occasional wrong word or \"sound-alike\" substitutions may have occurred due to the inherent limitations of voice recognition software  Interpretation should be guided by context      JONATHAN Peoples  5/26/2023  "

## 2023-05-30 VITALS
DIASTOLIC BLOOD PRESSURE: 76 MMHG | WEIGHT: 121.8 LBS | BODY MASS INDEX: 23.79 KG/M2 | OXYGEN SATURATION: 97 % | HEART RATE: 81 BPM | TEMPERATURE: 97.9 F | SYSTOLIC BLOOD PRESSURE: 114 MMHG | RESPIRATION RATE: 20 BRPM

## 2023-05-31 NOTE — ASSESSMENT & PLAN NOTE
Noted to have increased chest congestion accompanied with cough spells  Chest xray showed increased making in left perihilar region ? Pneumonia  Remains on prednisone, nebulizer tx and course of 5 days of Azithromycin 500 mg to be completed on 5/27/23  Patient improving with less coughing spells  Remains at increased risk for aspiration  Pt on puree texture and nectar consistency diet  Per records mother has been giving thin liquids  Mother has been counseled various times by staff  Respiratory status stable on RA  Lungs diminished with exp wheezes  C/w prednisone, nebs and azithromycin   Also as needed alexa dang   Speech therapist is following pt

## 2023-05-31 NOTE — ASSESSMENT & PLAN NOTE
BP log reviewed and stable   /76  BP trending between 105s/140s / 63s-88s 5/14-5/26/23  Recently Valsartan reduced to 160 mg daily  Avoid hypotension

## 2023-06-02 ENCOUNTER — NURSING HOME VISIT (OUTPATIENT)
Dept: GERIATRICS | Facility: OTHER | Age: 58
End: 2023-06-02
Payer: MEDICARE

## 2023-06-02 DIAGNOSIS — J40 BRONCHITIS: ICD-10-CM

## 2023-06-02 DIAGNOSIS — G80.9 CEREBRAL PALSY, UNSPECIFIED TYPE (HCC): ICD-10-CM

## 2023-06-02 DIAGNOSIS — Z91.89 AT HIGH RISK FOR ASPIRATION: Primary | ICD-10-CM

## 2023-06-02 DIAGNOSIS — I10 ESSENTIAL HYPERTENSION: ICD-10-CM

## 2023-06-02 DIAGNOSIS — E03.9 HYPOTHYROIDISM, UNSPECIFIED TYPE: ICD-10-CM

## 2023-06-02 PROCEDURE — 99309 SBSQ NF CARE MODERATE MDM 30: CPT | Performed by: NURSE PRACTITIONER

## 2023-06-03 ENCOUNTER — TELEPHONE (OUTPATIENT)
Dept: OTHER | Facility: OTHER | Age: 58
End: 2023-06-03

## 2023-06-03 NOTE — PROGRESS NOTES
"[address], [address], [facility]  [phone]    NAME: Lissette Harrell  AGE: 61 y o  SEX: female    Progress Note  LT 28    Location: [facility] 234 beds at the [facility]   POS: 32 (SNF)     Patient's care was coordinated with nursing facility staff  Recent vitals, labs, and updated medications were review on [facility] system in facility  Assessment/Plan:    Bronchitis  Noted to have increased chest congestion accompanied with cough spells  Chest xray showed increased making in left perihilar region ? Pneumonia  Remains on prednisone and nebulizer tx  Completed course of 5 days of Azithromycin on 5/27  Patient improving with less coughing spells  Remains at increased risk for aspiration  Pt on puree texture and nectar consistency diet  Per records mother has been giving thin liquids  Mother has been counseled various times by staff  Respiratory status stable on RA  Lungs diminished +coarse at bases  C/w prednisone and nebs as needed  Per speech therapist pt is no swallowing foods  concern for risk for aspiration  Patient's mom refusing feeding tube  Mom states pt is not eating bc of pain since pt suffered a fall on 5/25  On exam pt appears comfortable  Non verbal cues for pain absent  Since pt is not eating ? Future dehydration and electrolytes imbalances  Will have another discussion with mother regarding [address]  Speech therapist following     At high risk for aspiration  Speech therapist reporting pt is not swallowing and pocketing food  Pt just completed antibiotic regimen for aspiration pneumonia  Remains on prednisone and nebs as needed for SBO/wheezes  Long discussion with mom recommending feeding tube placement  Mom refusing and stated, \"I would never allow you guys to place a feeding tube on my daughter, you are not going to tell me how to feed my daughter or what to do  \" provider explained it is my duty to explain risks/benefits and alternatives for care since patient is at " "high risk for aspiration  Per staff, Mom continues to feed patient thin liquids as soda and ice cream putting pt back to another aspiration pneumonia  Noted pt with coughing spells again  Lungs diminished and with coarse at bases  Appears non toxic  Afebrile  VSS  Will have another conversation with mom regarding [address]  Concern for dehydration and electrolyte imbalances  Suspect this is a progression of CP and recommend patient for comfort care standpoint or hospice services     Essential hypertension  BP log reviewed and stable  /84  BP trending between 105s/140s / 63s-88s   C/w Valsartan 160 mg daily  Avoid hypotension      Hypothyroidism  TSH 0 61 on 3/3/23  Stable   C/w Levothyroxine 88 MCG daily  Repeat TSH in 6 - 1 year     Cerebral palsy ([address] )  With known history   +UE LE contractures   Dependent for all ADLS/IADLs  B/B incontinent  Sling zane to recliner  Continue 24/7 supportive care     Chief complaint / Reason for visit: LTC Acute Visit     History of Present Illness:   Patient is a 62-year old female seen and examined for LTC acute visit  Speech therapy reports pt is not swallowing food or fluids now  Recently pt was treated with antibiotic for aspiration pneumonia  Per staff, patient's mom keeps feeding patient thin liquids like soda and ice cream  Pt remains on puree texture and nectar consistency diet  now speech therapy recommending pt to be NPO and feeding tube placement  This provider discussed with patient's mom alternative, one is feeding tube  Mom declines feeding tube  As mom thinks pt is on pain and that's the reason pt is not eating, that once her pain is addressed pt will start eating again  Explained to mom risk for aspiration pneumonia and safety if continues to feed patient  Patient's mom stated, \"I'm not going to let my daughter get a feeding tube, I know my daughter I have been feeding her for over 48 years  \" provider explained to mom it is my duty to discussed risks/benefits and " alternatives for patient  Per mom pt fell on 5/25 and no one reported to mom  Will revisit feeding tube and if pt is not eating risk for dehydration and electrolytes imbalances with mom  Additionally patient's mom will have to decide if would like patient on comfort/hopisce care  On exam pt was lying in recliner, resting  Appears comfortable and in no acute distress  Pleasantly confused  Pt is nonverbal  Nonverbal sign of pain absent  Lungs diminished coarse at bases  No edema noted on b/l lower extremities  Pt requires full assistance with ADLs/IADLs  Skyler lift  B/B incontinent  + UE contractures  Left foot 1+ edema and right with trace edema  Nursing report pt is afebrile  VSS  Appears non toxic  No other concerns or issues at this time  Review of Systems:  Per history of present illness, all other systems reviewed and negative  Other than those noted in HPI    HISTORY:  Medical Hx: Reviewed, unchanged  Family Hx: Reviewed, unchanged  Soc Hx: Reviewed,  unchanged    ALLERGY: Reviewed, unchanged  Allergies   Allergen Reactions   • Bethanechol      Reaction Date: 10Dec2007; SEIZURES     • Metoclopramide      Reaction Date: 10Dec2007; SEIZURES     • Sulfamethoxazole-Trimethoprim      Reaction Date: 10Dec2007; SEIZURES        PHYSICAL EXAM:  Visit Vitals  /84   Pulse 70   Temp 97 9 °F (36 6 °C)   Resp 20   Wt 121 8 lb   SpO2 97% Comment: at rest RA   BMI 23 79 kg/m²   OB Status Hysterectomy   Smoking Status Never   BSA 1 51 m²     General: frail and weak   Head: Atraumatic  Normocephalic  Eye Exam: anicteric sclera, no discharge, PERRLA, No injection  Oral Exam: moist mucous membranes, no buccaloropharyngeal erythema, palatine tonsils WNL  Cough   Neck Exam: no anterior cervical lymphadenopathy noted, neck supple  Cardiovascular: regular rate, regular rhythm, no murmurs, rubs, or gallops  Pulmonary: breath sounds diminished with + expiratory wheeze, no rhonchi, no rales   No chest tenderness  Abdominal: "soft, non-tender, nondistended, bowel sounds audible x 4 quadrants  : Non distended bladder  B/B incontinent   Extremities and skin: left foot 1+ and right trace edema noted, no rashes  +UE contractures   Neurological: alert  Confused  moving all 4 extremities symmetrically    Laboratory results / Imaging reviewed: Hard copy/ies in medical chart: reviewed from [facility]     5/23/23  Wbc 6 0, hgb 10 3, hct 34, platelets 170, BUN 38, Creatinine 1 0, sodium 145, potassium 5 1, calcium 10 0, GFR 57    Current Medications: All medications reviewed and updated in Nursing Home Chart    Please note:  Voice-recognition software may have been used in the preparation of this document  Occasional wrong word or \"sound-alike\" substitutions may have occurred due to the inherent limitations of voice recognition software  Interpretation should be guided by context      Donnamae Barthel, CRNP  6/2/2023  "

## 2023-06-03 NOTE — TELEPHONE ENCOUNTER
Cornelio John from [facility] called in stating she would like to follow up on pt's chest xray  On call paged via TC  EMPLOYER CLINIC NOTE    HISTORY OF PRESENT ILLNESS:  Francisco Campbell is a 63 year old male who comes in today for follow-up on his new diagnosis of hypertension.  He continues on benazepril 10 mg daily and he reports that overall he is tolerating this well.  His readings are averaging low 140's/upper 70's- mid 80's.  This has come down from where he was prior to med initiation.    Cardiac symptoms:  none.    He does report that he snores and at times does wake his wife up.      Patient denies chest pain, chest pressure, claudication, dyspnea, exertional chest pressure/discomfort, fatigue, irregular heart beat, lower extremity edema -  Bilateral, near-syncope, palpitations, tachypnea.  Cardiovascular risk factors:  family history, age, and lipids.     Patient is otherwise per his usual state of health and has no additional concerns to report today.      -Negative COVID Universal Screen.      Patient Active Problem List   Diagnosis   • Hypercholesterolemia   • Healthcare maintenance   • Elevated blood-pressure reading without diagnosis of hypertension       No outpatient medications have been marked as taking for the 12/23/21 encounter (Office Visit) with Cari Fernández PA-C.       ALLERGIES:  No Known Allergies      Alcohol Use: Yes           .6 oz/week       Comment: 5 beers per week      Social History     Tobacco Use   Smoking Status Former Smoker   • Packs/day: 0.00   • Types: Cigarettes   Smokeless Tobacco Never Used       Current Outpatient Medications   Medication Sig Dispense Refill   • benazepril (LOTENSIN) 10 MG tablet Take 1 tablet by mouth daily. 30 tablet 1   • Multiple Vitamins-Minerals (MULTIVITAMIN PO)      • Omega-3 Fatty Acids (FISH OIL) 1000 MG CAPSULE DELAYED RELEASE        No current facility-administered medications for this visit.       ALLERGIES:  No Known Allergies    Past Medical History:   Diagnosis Date   • No known problems        REVIEW OF SYSTEMS  GENERAL: denies fever, chills,  fatigue and weight loss.   SKIN: denies  rash, dry skin, scaling, itching, bruising and lumps or bumps  EYES: denies  visual blurring, double vision, eye pain, discharge from the eyes and itching in the eyes  EARS: denies  ringing or tinnitus in the ears, discharge from the ears and vertigo  NOSE: denies  nose bleed, discharge from the nose, sneezing, rhinorrhea, post nasal drainage and sinus pressure/pain  MOUTH: denies  soreness of the mouth or tongue, lumps or bumps in the mouth, abnormality of the teeth or gums and hoarse voice, ADMITS TO SNORING  NECK: denies  swelling or masses in the neck, stiffness in the neck, limited range of motion in the neck, goiter, pain in the neck, dysphagia and reflux   CARDIORESP: denies  chest pain, palpitations, fast heart rate, edema, hemoptysis, wheeze, shortness of breath, sputum, PND, orthopnea and claudication  GI: denies  abdominal pain, nausea, vomiting, constipation, diarrhea, black tarry stools, blood in the stools and change in bowel habits  VASCULAR: denies  claudication with walking and discoloration of the feet  NEURO: denies weakness, numbness, loss of sensation, speech disturbance, trouble with balance or coordination, loss of memory and confusion  PSYCH: denies  symptoms of depression, feeling sad or blue, insomnia, anxiety and lack of motivation  MUSCULOSKEL: denies  joint pain, joint swelling and stiff neck       PHYSICAL EXAMINATION:  Visit Vitals  BP (!) 140/80 (BP Location: LUE - Left upper extremity, Patient Position: Sitting, Cuff Size: Large Adult)   Pulse 62   SpO2 96%       General:  Alert, cooperative, conversive. No acute distress.  Skin:  warm, dry no evidence of rash or other lesions  Extremities:   No edema.  No deformity.  No tenderness.  Neuro:   Alert, oriented x4.  Speech intact.  No dysphasia or dysarthria.  Psych:   Affect is  appropriate with normal social interaction. speech is of normal affect and speed    ASSESSMENT:  1. Hypertension,  unspecified type          PLAN:  I took his reading today with the clinic cuff and then with his home cuff.  His readings run slightly higher.  He was 148/88 sitting, LUE, large cuff as compared to my 140/80 on the clinic cuff.  His home readings remain above goal, so I will go ahead and increase him to benazepril 20 mg qd.  I will have him use up the Rx he has now in case he doesn't tolerate the increase, we can easily drop him back down.    I will check a BMP on him today and get back to him with the results on Tues.  I will have him continue to monitor and follow up with me for another recheck next week with all his readings.    I have recommended that we bring his cuff in so I can compare the readings with mine.      Recommended DASH diet and daily exercise as tolerated.  Discussed a goal BP of <140/90, will target 130/80 and see how he tolerates a lower pressure.   I am recommending that he discuss a sleep study with his new PCP to rule out sleep apnea as a contributing problem.      The patient indicated understanding of the diagnosis and agreed with the plan of care.    Orders Placed This Encounter   • Basic Metabolic Panel       Cari Fernández PA-C  Supervising Physician: Jessica Greene DO

## 2023-06-05 ENCOUNTER — NURSING HOME VISIT (OUTPATIENT)
Dept: GERIATRICS | Facility: OTHER | Age: 58
End: 2023-06-05
Payer: MEDICARE

## 2023-06-05 DIAGNOSIS — G80.9 CEREBRAL PALSY, UNSPECIFIED TYPE (HCC): ICD-10-CM

## 2023-06-05 DIAGNOSIS — J40 BRONCHITIS: ICD-10-CM

## 2023-06-05 DIAGNOSIS — R53.81 PHYSICAL DECONDITIONING: ICD-10-CM

## 2023-06-05 DIAGNOSIS — Z91.89 AT HIGH RISK FOR ASPIRATION: Primary | ICD-10-CM

## 2023-06-05 DIAGNOSIS — R26.2 AMBULATORY DYSFUNCTION: ICD-10-CM

## 2023-06-05 PROCEDURE — 99310 SBSQ NF CARE HIGH MDM 45: CPT | Performed by: NURSE PRACTITIONER

## 2023-06-05 RX ORDER — UREA 10 %
1 LOTION (ML) TOPICAL 2 TIMES DAILY
COMMUNITY

## 2023-06-05 RX ORDER — ALBUTEROL SULFATE 2.5 MG/3ML
2.5 SOLUTION RESPIRATORY (INHALATION) EVERY 6 HOURS PRN
COMMUNITY

## 2023-06-05 RX ORDER — AMOXICILLIN AND CLAVULANATE POTASSIUM 875; 125 MG/1; MG/1
1 TABLET, FILM COATED ORAL EVERY 12 HOURS SCHEDULED
COMMUNITY

## 2023-06-05 RX ORDER — ENEMA 19; 7 G/133ML; G/133ML
1 ENEMA RECTAL ONCE AS NEEDED
COMMUNITY

## 2023-06-05 RX ORDER — VALSARTAN 160 MG/1
160 TABLET ORAL DAILY
COMMUNITY

## 2023-06-05 RX ORDER — PREDNISONE 20 MG/1
20 TABLET ORAL 2 TIMES DAILY
COMMUNITY

## 2023-06-05 NOTE — ASSESSMENT & PLAN NOTE
"Speech therapist reporting pt is not swallowing and pocketing food  Pt just completed antibiotic regimen for aspiration pneumonia  Remains on prednisone and nebs as needed for SBO/wheezes  Long discussion with mom recommending feeding tube placement  Mom refusing and stated, \"I would never allow you guys to place a feeding tube on my daughter, you are not going to tell me how to feed my daughter or what to do  \" provider explained it is my duty to explain risks/benefits and alternatives for care since patient is at high risk for aspiration  Per staff, Mom continues to feed patient thin liquids as soda and ice cream putting pt back to another aspiration pneumonia  Noted pt with coughing spells again  Lungs diminished and with coarse at bases  Appears non toxic  Afebrile  VSS  Will have another conversation with mom regarding [address]   Concern for dehydration and electrolyte imbalances  Suspect this is a progression of CP and recommend patient for comfort care standpoint or hospice services   "

## 2023-06-05 NOTE — ASSESSMENT & PLAN NOTE
Noted to have increased chest congestion accompanied with cough spells  Chest xray showed increased making in left perihilar region ? Pneumonia  Remains on prednisone and nebulizer tx  Completed course of 5 days of Azithromycin on 5/27  Patient improving with less coughing spells  Remains at increased risk for aspiration  Pt on puree texture and nectar consistency diet  Per records mother has been giving thin liquids  Mother has been counseled various times by staff  Respiratory status stable on RA  Lungs diminished +coarse at bases  C/w prednisone and nebs as needed  Per speech therapist pt is no swallowing foods  concern for risk for aspiration  Patient's mom refusing feeding tube  Mom states pt is not eating bc of pain since pt suffered a fall on 5/25  On exam pt appears comfortable  Non verbal cues for pain absent  Since pt is not eating ? Future dehydration and electrolytes imbalances   Will have another discussion with mother regarding [address]  Speech therapist following

## 2023-06-05 NOTE — ASSESSMENT & PLAN NOTE
With known history   +UE LE contractures   Dependent for all ADLS/IADLs  B/B incontinent   Sling zaen to recliner  Continue 24/7 supportive care

## 2023-06-05 NOTE — ASSESSMENT & PLAN NOTE
BP log reviewed and stable   /84  BP trending between 105s/140s / 63s-88s   C/w Valsartan 160 mg daily  Avoid hypotension

## 2023-06-06 NOTE — PROGRESS NOTES
[address], [address] [place] [address], [address] [place] [address]  [phone]    NAME: Lisy Dick  AGE: 61 y o  SEX: female    Progress Note  LTC 28    Location: [facility]   POS: 28 (LT)     Patient's care was coordinated with nursing facility staff  Recent vitals, labs, and updated medications were review on [facility] system in facility  Assessment/Plan:    Bronchitis  On exam improving  Remains with intermittent cough spells  Chest xray showed increased making in left perihilar region ? Pneumonia  Remains on prednisone and nebulizer tx  Completed course of 5 days of Azithromycin on 5/27  Remains at increased risk for aspiration  Pt on puree texture and nectar consistency diet  Per records mother has been giving thin liquids  Mother has been counseled various times by staff  Per speech pt is pocketing food  Recommend feeding tube  Patient 's mom opted against feeding tube  benefist vs risk have been presented to mom  Mom stated that once her pt feels better will start eating again  Mom understands and will not agreed for feeding tube placement  I suspect this could be due to disease progression vs new symptoms of bronchits  Respiratory status stable on RA  Lungs diminished +coarse at bases  C/w prednisone and nebs as needed  On exam pt appears comfortable  Non verbal cues for pain absent  Mom understand pt is at risk for dehydration and electrolytes imbalances   Will have another discussion with mother regarding [address]  Speech therapist following   Continue aspiration precautions   Will continue to monitor     Physical deconditioning  Multifactorial in the setting of acute and chronic medical conditions   Patient with known history of cerebral palsy  Noted UE/LE contractures and rigidity  Bed bound   Continue restorative services   Continue to implement fall and safety precaution   Continue with pressure offloading and assist  Encourage adequate oral hydration and nutrition, continue "with daily multivitamins and supplements   Continue with 24-7 supportive    Ambulatory dysfunction  PT/OT as needed per facility   Continue fall and safety precaution   Dependent for ADLs/IADLs  Continue with supportive care   PT/OT/SL/SW/DT/ following    Cerebral palsy ([facility] [address] )  With known history   +UE LE contractures   Dependent for all ADLS/IADLs  bed bound  B/B incontinent  Sling zane to recliner  Continue 24/7 supportive care     At high risk for aspiration  Per Speech therapist pt is not swallowing and pocketing food  Status post antibiotic course for aspiration pneumonia  Remains on prednisone and nebs as needed for SBO/wheezes  Long discussion with mom recommending feeding tube placement  Mom refusing and stated, \"I would never allow you guys to place a feeding tube on my daughter, you are not going to tell me how to feed my daughter or what to do  \" provider explained it is my duty to explain risks/benefits and alternatives for care since patient is at high risk for aspiration  Per staff, Mom continues to feed patient thin liquids as soda and ice cream putting pt back to another aspiration pneumonia  Noted pt with coughing spells again  Lungs diminished and with coarse at bases  Appears non toxic  Afebrile  VSS  Will have another conversation with mom regarding [address]  Concern for dehydration and electrolyte imbalances  Suspect this is a progression of CP vs bronchitis   Continue aspiration precautions     Chief complaint / Reason for visit: LTC Acute Visit     History of Present Illness:   Patient is a 62-year old female seen and examined for LTC acute visit  Speech therapy reports pt is not swallowing food or fluids now  Recently pt was treated with antibiotic for aspiration pneumonia  Per staff, patient's mom keeps feeding patient thin liquids like soda and ice cream  Pt remains on puree texture and nectar consistency diet  now speech therapy recommending pt to be NPO and feeding tube placement   This provider " "discussed with patient's mom alternative, one is feeding tube  Mom declines feeding tube  As mom thinks pt is on pain and that's the reason pt is not eating, that once her pain is addressed pt will start eating again  Explained to mom risk for aspiration pneumonia and safety if continues to feed patient  Patient's mom stated, \"I'm not going to let my daughter get a feeding tube, I know my daughter I have been feeding her for over 48 years  \" provider explained to mom it is my duty to discussed risks/benefits and alternatives for patient  Per mom pt fell on 5/25 and no one reported to mom  Will revisit feeding tube and if pt is not eating risk for dehydration and electrolytes imbalances with mom  Additionally patient's mom will have to decide if would like patient on comfort/hopisce care  On exam pt was lying in recliner, resting  Appears comfortable and in no acute distress  Pleasantly confused  Pt is nonverbal  Nonverbal sign of pain absent  Lungs diminished coarse at bases  No edema noted on b/l lower extremities  Pt requires full assistance with ADLs/IADLs  Skyler lift  B/B incontinent  + UE contractures  Left foot 1+ edema and right with trace edema  Nursing report pt is afebrile  VSS  Appears non toxic  No other concerns or issues at this time  Review of Systems:  Per history of present illness, all other systems reviewed and negative   Other than those noted in HPI    HISTORY:  Medical Hx: Reviewed, unchanged  Family Hx: Reviewed, unchanged  Soc Hx: Reviewed,  unchanged    ALLERGY: Reviewed, unchanged  Allergies   Allergen Reactions   • Bethanechol      Reaction Date: 10Dec2007; SEIZURES     • Metoclopramide      Reaction Date: 10Dec2007; SEIZURES     • Sulfamethoxazole-Trimethoprim      Reaction Date: 10Dec2007; SEIZURES        PHYSICAL EXAM:  Visit Vitals  /84   Pulse 70   Temp 97 9 °F (36 6 °C)   Resp 20   Wt 121 8 lb   SpO2 97% Comment: at rest RA   BMI 23 79 kg/m²   OB Status Hysterectomy " Smoking Status Never   BSA 1 51 m²     General: frail and weak   Head: Atraumatic  Normocephalic  Eye Exam: anicteric sclera, no discharge, PERRLA, No injection  Oral Exam: moist mucous membranes, no buccaloropharyngeal erythema, palatine tonsils WNL  Cough   Neck Exam: no anterior cervical lymphadenopathy noted, neck supple  Cardiovascular: regular rate, regular rhythm, no murmurs, rubs, or gallops  Pulmonary: breath sounds diminished with + expiratory wheeze, no rhonchi, no rales  No chest tenderness  Abdominal: soft, non-tender, nondistended, bowel sounds audible x 4 quadrants  : Non distended bladder  B/B incontinent   Extremities and skin: left foot 1+ and right trace edema noted, no rashes  +UE contractures   Neurological: alert  Confused  moving all 4 extremities symmetrically    Laboratory results / Imaging reviewed: Hard copy/ies in medical chart: reviewed from [facility]     5/23/23  Wbc 6 0, hgb 10 3, hct 34, platelets 019, BUN 38, Creatinine 1 0, sodium 145, potassium 5 1, calcium 10 0, GFR 57    Current Medications: All medications reviewed and updated in Nursing Home Chart    I have spent >45 minutes with patient today in which greater than 50% of this time was spent in counseling/coordination of care regarding Diagnostic results, Prognosis, Risks and benefits of tx options, Instructions for management, Patient and family education, Importance of tx compliance, Risk factor reductions, Impressions, Counseling / Coordination of care, Documenting in the medical record, Reviewing / ordering tests, medicine, procedures  , Obtaining or reviewing history   and Communicating with other healthcare professionals care plan discussed with speech therapist and nurses  Long discussion with mom on the phone and at facility  Risks and benefits discussed in detail  Additionally discussed adherence to diet to prevent recurrent aspiration pneumonia   Case also discussed with attending physician who also have discussed with "patient's mom  Please note:  Voice-recognition software may have been used in the preparation of this document  Occasional wrong word or \"sound-alike\" substitutions may have occurred due to the inherent limitations of voice recognition software  Interpretation should be guided by JONATHAN Randall  6/5/2023  "

## 2023-06-09 ENCOUNTER — NURSING HOME VISIT (OUTPATIENT)
Dept: GERIATRICS | Facility: OTHER | Age: 58
End: 2023-06-09
Payer: MEDICARE

## 2023-06-09 DIAGNOSIS — G80.9 CEREBRAL PALSY, UNSPECIFIED TYPE (HCC): ICD-10-CM

## 2023-06-09 DIAGNOSIS — B37.0 THRUSH, ORAL: Primary | ICD-10-CM

## 2023-06-09 DIAGNOSIS — J40 BRONCHITIS: ICD-10-CM

## 2023-06-09 DIAGNOSIS — R26.2 AMBULATORY DYSFUNCTION: ICD-10-CM

## 2023-06-09 PROCEDURE — 99309 SBSQ NF CARE MODERATE MDM 30: CPT | Performed by: NURSE PRACTITIONER

## 2023-06-09 NOTE — ASSESSMENT & PLAN NOTE
On exam improving  Remains with intermittent cough spells  Chest xray showed increased making in left perihilar region ? Pneumonia  Remains on prednisone and nebulizer tx  Completed course of 5 days of Azithromycin on 5/27  Remains at increased risk for aspiration  Pt on puree texture and nectar consistency diet  Per records mother has been giving thin liquids  Mother has been counseled various times by staff  Per speech pt is pocketing food  Recommend feeding tube  Patient 's mom opted against feeding tube  benefist vs risk have been presented to mom  Mom stated that once her pt feels better will start eating again  Mom understands and will not agreed for feeding tube placement  I suspect this could be due to disease progression vs new symptoms of bronchits  Respiratory status stable on RA  Lungs diminished +coarse at bases  C/w prednisone and nebs as needed  On exam pt appears comfortable  Non verbal cues for pain absent  Mom understand pt is at risk for dehydration and electrolytes imbalances   Will have another discussion with mother regarding [address]  Speech therapist following   Continue aspiration precautions   Will continue to monitor

## 2023-06-09 NOTE — ASSESSMENT & PLAN NOTE
With known history   +UE LE contractures   Dependent for all ADLS/IADLs  bed bound  B/B incontinent   Sling zane to recliner  Continue 24/7 supportive care

## 2023-06-09 NOTE — ASSESSMENT & PLAN NOTE
"Per Speech therapist pt is not swallowing and pocketing food  Status post antibiotic course for aspiration pneumonia  Remains on prednisone and nebs as needed for SBO/wheezes  Long discussion with mom recommending feeding tube placement  Mom refusing and stated, \"I would never allow you guys to place a feeding tube on my daughter, you are not going to tell me how to feed my daughter or what to do  \" provider explained it is my duty to explain risks/benefits and alternatives for care since patient is at high risk for aspiration  Per staff, Mom continues to feed patient thin liquids as soda and ice cream putting pt back to another aspiration pneumonia  Noted pt with coughing spells again  Lungs diminished and with coarse at bases  Appears non toxic  Afebrile  VSS  Will have another conversation with mom regarding [address]   Concern for dehydration and electrolyte imbalances  Suspect this is a progression of CP vs bronchitis   Continue aspiration precautions   "

## 2023-06-10 NOTE — PROGRESS NOTES
[address], [address] [place] [address], [address] [place] [address]  [phone]    NAME: Jarrell Lea  AGE: 61 y o  SEX: female    Progress Note  [facility] 28    Location: [facility]   POS: 28 ([facility])     Patient's care was coordinated with nursing facility staff  Recent vitals, labs, and updated medications were review on [facility] system in facility  Assessment/Plan: Thrush, oral  Noted with white patches along the tongue  Remains afebrile  VSS  Non toxic  Started on nystatin QID for 14 days and peridex to clean mouth then to apply nystatin using swab since pt unable to retain in mouth  Recommend Twice a day oral care     Bronchitis  On exam improving  Remains with intermittent cough spells however has improved significantly  Chest xray showed increased making in left perihilar region ? Pneumonia  Remains on prednisone and nebulizer tx  Completed course of 5 days of Azithromycin on 5/27  Pt on puree texture and nectar consistency diet  Respiratory status stable on RA  Lungs diminished and clear   C/w prednisone and nebs as needed  On exam pt appears comfortable  Non verbal cues for pain absent  Continue aspiration precautions   Will continue to monitor     Ambulatory dysfunction  PT/OT as needed per facility   Continue fall and safety precaution   Dependent for ADLs/IADLs  Continue with supportive care   PT/OT/SL/SW/DT/ following    Cerebral palsy ([facility] [address] )  With known history   +UE LE contractures   Dependent for all ADLS/IADLs  bed bound  B/B incontinent  Sling zane to recliner  Continue 24/7 supportive care     Chief complaint / Reason for visit: LTC Acute Visit     History of Present Illness:   Patient is a 62-year old female seen and examined for LTC acute visit  Received pt lying in recliner, resting  a time of my evaluation patient is doing okay  Appears comfortable and in no acute distress  Mom at bedside  On examination patient is noted to have oral thrush    Per mom she noticed white patches along the tongue but did not think that it was thrush  Patient has history of thrush  Start her on nystatin 4 times a day for 14 days  Reevaluate and if needed will extend therapy  Additionally nurses to use Peridex before nystatin to clean mouth  Patient recently completed antimicrobial therapy for aspiration pneumonia  Pt remains on puree texture and nectar consistency diet  Nonverbal  Nonverbal sign of pain absent  Lungs diminished and clear  No edema noted on b/l lower extremities  Pt requires full assistance with ADLs/IADLs  Skyler lift  B/B incontinent  + UE contractures  Left foot 1+ edema and right with trace edema  Nursing report pt is afebrile  VSS  Appears non toxic  No other concerns or issues at this time  Review of Systems:  Per history of present illness, all other systems reviewed and negative  Other than those noted in HPI    HISTORY:  Medical Hx: Reviewed, unchanged  Family Hx: Reviewed, unchanged  Soc Hx: Reviewed,  unchanged    ALLERGY: Reviewed, unchanged  Allergies   Allergen Reactions   • Bethanechol      Reaction Date: 10Dec2007; SEIZURES     • Metoclopramide      Reaction Date: 10Dec2007; SEIZURES     • Sulfamethoxazole-Trimethoprim      Reaction Date: 10Dec2007; SEIZURES        PHYSICAL EXAM:  Visit Vitals  /84   Pulse 70   Temp 97 9 °F (36 6 °C)   Resp 20   Wt 121 8 lb   SpO2 97% Comment: at rest RA   BMI 23 79 kg/m²   OB Status Hysterectomy   Smoking Status Never   BSA 1 51 m²     General: frail and weak   Head: Atraumatic  Normocephalic  Eye Exam: anicteric sclera, no discharge, PERRLA, No injection  Oral Exam: dry mucous membranes  Noted with white patches along tongue  No buccaloropharyngeal erythema, palatine tonsils WNL  Cough   Neck Exam: no anterior cervical lymphadenopathy noted, neck supple  Cardiovascular: regular rate, regular rhythm, no murmurs, rubs, or gallops  Pulmonary: breath sounds diminished with + expiratory wheeze, no rhonchi, no rales  "No chest tenderness  Abdominal: soft, non-tender, nondistended, bowel sounds audible x 4 quadrants  : Non distended bladder  B/B incontinent   Extremities and skin: left foot 1+ and right trace edema noted, no rashes  +UE contractures   Neurological: alert  Confused  moving all 4 extremities symmetrically    Laboratory results / Imaging reviewed: Hard copy/ies in medical chart: reviewed from [facility]     5/23/23  Wbc 6 0, hgb 10 3, hct 34, platelets 165, BUN 38, Creatinine 1 0, sodium 145, potassium 5 1, calcium 10 0, GFR 57    Current Medications: All medications reviewed and updated in Nursing Home Chart    I have spent >45 minutes with patient today in which greater than 50% of this time was spent in counseling/coordination of care regarding Diagnostic results, Prognosis, Risks and benefits of tx options, Instructions for management, Patient and family education, Importance of tx compliance, Risk factor reductions, Impressions, Counseling / Coordination of care, Documenting in the medical record, Reviewing / ordering tests, medicine, procedures  , Obtaining or reviewing history   and Communicating with other healthcare professionals care plan discussed with speech therapist and nurses  Long discussion with mom on the phone and at facility  Risks and benefits discussed in detail  Additionally discussed adherence to diet to prevent recurrent aspiration pneumonia  Case also discussed with attending physician who also have discussed with patient's mom  Please note:  Voice-recognition software may have been used in the preparation of this document  Occasional wrong word or \"sound-alike\" substitutions may have occurred due to the inherent limitations of voice recognition software  Interpretation should be guided by context      JONATHAN Paz  6/9/2023  "

## 2023-06-11 PROBLEM — B37.0 THRUSH, ORAL: Status: ACTIVE | Noted: 2023-06-11

## 2023-06-12 NOTE — ASSESSMENT & PLAN NOTE
Noted with white patches along the tongue  Remains afebrile  VSS   Non toxic  Started on nystatin QID for 14 days and peridex to clean mouth then to apply nystatin using swab since pt unable to retain in mouth  Recommend Twice a day oral care

## 2023-06-12 NOTE — ASSESSMENT & PLAN NOTE
On exam improving  Remains with intermittent cough spells however has improved significantly  Chest xray showed increased making in left perihilar region ? Pneumonia  Remains on prednisone and nebulizer tx  Completed course of 5 days of Azithromycin on 5/27  Pt on puree texture and nectar consistency diet  Respiratory status stable on RA  Lungs diminished and clear   C/w prednisone and nebs as needed  On exam pt appears comfortable  Non verbal cues for pain absent   Continue aspiration precautions   Will continue to monitor

## 2023-06-17 ENCOUNTER — TELEPHONE (OUTPATIENT)
Dept: OTHER | Facility: OTHER | Age: 58
End: 2023-06-17

## 2023-06-20 ENCOUNTER — NURSING HOME VISIT (OUTPATIENT)
Dept: GERIATRICS | Facility: OTHER | Age: 58
End: 2023-06-20
Payer: MEDICARE

## 2023-06-20 DIAGNOSIS — G80.9 CEREBRAL PALSY, UNSPECIFIED TYPE (HCC): ICD-10-CM

## 2023-06-20 DIAGNOSIS — I10 ESSENTIAL HYPERTENSION: ICD-10-CM

## 2023-06-20 DIAGNOSIS — Z91.81 STATUS POST FALL: Primary | ICD-10-CM

## 2023-06-20 DIAGNOSIS — R53.81 PHYSICAL DECONDITIONING: ICD-10-CM

## 2023-06-20 PROCEDURE — 99309 SBSQ NF CARE MODERATE MDM 30: CPT | Performed by: NURSE PRACTITIONER

## 2023-06-21 NOTE — PROGRESS NOTES
[address], [address] [place] [address], [address] [place] [address]  [phone]    NAME: Zack Maxwell  AGE: 61 y o  SEX: female    Progress Note  [facility] 28    Location: [facility]   POS: 28 ([facility])     Patient's care was coordinated with nursing facility staff  Recent vitals, labs, and updated medications were review on [facility] system in facility  Assessment/Plan:    Status post fall  Nursing reporting pt appears in pain  Pt with increase spasticity, moaning and grimacing  Mother agreeable to start Tramadol 25 mg bid as needed and acetaminophen increased  Note with bruises on knee and upper arms  With known h/o cerebral palsy question if disease progression  Fall and safety precautions   Continue supportive care   Bed in the lowest position +fall mats present     Physical deconditioning  Multifactorial in the setting of acute and chronic medical conditions   Patient with known history of cerebral palsy  Noted UE/LE contractures and rigidity  Bed bound   Continue restorative services   Continue to implement fall and safety precaution   Continue with pressure offloading and assist  Encourage adequate oral hydration and nutrition, continue with daily multivitamins and supplements   Continue with 24-7 supportive    Cerebral palsy ([facility] [address] )  With known history   +UE LE contractures   Dependent for all ADLS/IADLs  bed bound  B/B incontinent  Sling zane to recliner  Continue 24/7 supportive care     Essential hypertension  BP log reviewed and within acceptable range   BP trending between 100s/137s / 66s-92s ; 116/66  C/w Valsartan 160 mg daily  Avoid hypotension      Chief complaint / Reason for visit: LTC Acute Visit     History of Present Illness:   Patient is a 62-year old female seen and examined for LTC acute visit  Nursing reporting pt status post fall  per nursing and mother's patient, pt appears in pain  moving arms/legs abruptly, moaning and grimacing   At the time of my evaluation pt is on recliner in dining room area  Patient's mother showing bruising on knees and upper left arm  Pt moans and grimacing with movements  Will start her on as needed tramadol low dose and increase acetaminophen  Mother agreeable  Remains afebrile  VSS  Appears non toxic  No edema noted on b/l lower extremities  Pt requires full assistance with ADLs/IADLs  Skyler lift  B/B incontinent  + UE contractures  Left foot 1+ edema and right with trace edema  Nursing report pt is afebrile  VSS  Appears non toxic  No other concerns or issues at this time  Review of Systems:  Per history of present illness, all other systems reviewed and negative  Other than those noted in HPI    HISTORY:  Medical Hx: Reviewed, unchanged  Family Hx: Reviewed, unchanged  Soc Hx: Reviewed,  unchanged    ALLERGY: Reviewed, unchanged  Allergies   Allergen Reactions   • Bethanechol      Reaction Date: 10Dec2007; SEIZURES     • Metoclopramide      Reaction Date: 10Dec2007; SEIZURES     • Sulfamethoxazole-Trimethoprim      Reaction Date: 10Dec2007; SEIZURES        PHYSICAL EXAM:  Visit Vitals  /66   Pulse 69   Temp 97 9 °F (36 6 °C)   Resp 20   Wt 111 4 lb   SpO2 97% Comment: at rest RA   BMI 23 79 kg/m²   OB Status Hysterectomy   Smoking Status Never   BSA 1 51 m²     General: frail and weak   Head: Atraumatic  Normocephalic  Eye Exam: anicteric sclera, no discharge, PERRLA, No injection  Oral Exam: dry mucous membranes  Noted with white patches along tongue  No buccaloropharyngeal erythema, palatine tonsils WNL  Cough   Neck Exam: no anterior cervical lymphadenopathy noted, neck supple  Cardiovascular: regular rate, regular rhythm, no murmurs, rubs, or gallops  Pulmonary: breath sounds diminished with + expiratory wheeze, no rhonchi, no rales  No chest tenderness  Abdominal: soft, non-tender, nondistended, bowel sounds audible x 4 quadrants  : Non distended bladder   B/B incontinent   Extremities and skin: left foot 1+ and right trace edema "noted, no rashes  +UE contractures   Neurological: alert  Confused  Contracted     Laboratory results / Imaging reviewed: Hard copy/ies in medical chart: reviewed from [facility]     5/23/23  Wbc 6 0, hgb 10 3, hct 34, platelets 533, BUN 38, Creatinine 1 0, sodium 145, potassium 5 1, calcium 10 0, GFR 57    Current Medications: All medications reviewed and updated in Nursing Home Chart    Please note:  Voice-recognition software may have been used in the preparation of this document  Occasional wrong word or \"sound-alike\" substitutions may have occurred due to the inherent limitations of voice recognition software  Interpretation should be guided by context      JONATHAN Topete  6/20/2023  "

## 2023-06-23 ENCOUNTER — NURSING HOME VISIT (OUTPATIENT)
Dept: GERIATRICS | Facility: OTHER | Age: 58
End: 2023-06-23
Payer: MEDICARE

## 2023-06-23 DIAGNOSIS — E03.9 HYPOTHYROIDISM, UNSPECIFIED TYPE: Primary | ICD-10-CM

## 2023-06-23 PROCEDURE — 99309 SBSQ NF CARE MODERATE MDM 30: CPT | Performed by: INTERNAL MEDICINE

## 2023-06-24 PROBLEM — Z91.81 STATUS POST FALL: Status: ACTIVE | Noted: 2023-06-24

## 2023-06-24 RX ORDER — TRAMADOL HYDROCHLORIDE 50 MG/1
25 TABLET ORAL 2 TIMES DAILY
COMMUNITY

## 2023-06-24 RX ORDER — LEVOTHYROXINE SODIUM 0.07 MG/1
75 TABLET ORAL DAILY
COMMUNITY

## 2023-06-24 NOTE — PROGRESS NOTES
Miranda Dolan  [facility]  [address]   [address] [facility] [address] [place], [place], [address]  48     Progress Note  [facility] 32    Patient Location     [address]    Reason for visit     Recurrent falls, hypothyroidism, cerebral palsy  Patient’s care was coordinated with nursing facility staff  Recent vitals, labs and updated medications were reviewed on [facility] system of facility  Problem List Items Addressed This Visit        Endocrine    Hypothyroidism - Primary     TSH was low at 0 10 on 6/21/2023  Levothyroxine dose was reduced from 100 to 75 mcg daily  Repeat TSH in 6 to 8 weeks          Recurrent falls:  Patient has known history of cerebral palsy and cognitive impairment due to above  She has had recurrent falls during the last few weeks  Per staff patient rolled herself out of the bed and tries to get out of the chair  Multiple measures were taken for fall prevention  Bed was lowered  Floor mattresses were placed  Patient is unable to undergo any physical therapy  Continue to monitor  Essential hypertension:  Blood pressure is stable on valsartan 160 mg daily    Chronic constipation:  Continue Linzess    GERD:  Patient remains on lansoprazole    Cerebral palsy:  Continue supportive care    Bronchitis:  Patient was treated for bronchitis and suspected pneumonia several weeks ago  Currently remains on prednisone  Will discontinue prednisone and follow    HPI       Patient is being seen for evaluation of recurrent falls  Per staff patient fell on 17th and 21st of this month  Apparently patient rolled out of the bed  She was noted to have resolving bruises over the knees  Family was concerned about fracture  Shoulder x-ray done on 6/17 was negative for fracture or dislocation  He was noted to have mild DJD changes  Patient was given Tylenol tramadol and Robaxin for pain relief as she is unable to express pain    At the time of my evaluation patient appears comfortable  She is unable to provide any history due to cerebral palsy  Patient had issues with swallowing several weeks ago  She was evaluated by speech therapist multiple times  She was deemed at high risk for aspiration however swallowing issues have improved  No recent aspiration episode    Review of Systems   Constitutional: Negative for chills and fever  HENT: Positive for trouble swallowing (at times, remains at risk for aspiration)  Respiratory: Positive for cough (occasional)  Negative for shortness of breath and stridor  Gastrointestinal: Negative for abdominal distention and vomiting  Genitourinary: Negative for hematuria  Musculoskeletal: Positive for gait problem  Neurological: Negative for seizures and weakness  Psychiatric/Behavioral: Positive for confusion  Past Medical History:   Diagnosis Date   • Arthritis    • Blood pressure instability UNKNOWN   • Bowel obstruction (HCC)     intestine   • Bradycardia    • Cerebral palsy [facility])    • Chronic edema    • Closed fracture of coccyx (HCC)    • DVT (deep venous thrombosis) ([facility] [address] )     US Doppler done 10/2015 and 3/2016   • GERD (gastroesophageal reflux disease)    • History of Doppler ultrasound     of UE and LE; 10/2015 and 3/2016 DVT   • History of echocardiogram 03/2016    EF 55%  Trace mitral and tricuspid regurgitation  PA systolic pressure 33  Echo Doppler 2/2017- EF 60%  • History of EKG 10/20/2016    Normal sinus rhythm with sinus arrhythmia  Abnormal ECG    • History of Holter monitoring 11/2017    14 DAY MONITOR Baseline rhythm was of sinus origin with rare APCs and VPCs and a few baseline motion artifact  NO OTHER ARRHYTHMIAS  No symptoms was reported in the attached diary  • History of Holter monitoring 10/2016    Sinus rhythm  There were a few episodes of asymptomatic sinus arrhythmia  There was sinus bradycardia at rate as low as 47 bpm  There were rare single PACs and PVCs      • Hypertension    • Hyperthyroidism    • Intellectual disability    • Kidney stone    • Lymphedema    • Pneumonia    • Stroke ([facility] [address] )     UNCERTAIN OF TYPE/NON VERBAL   • Tachycardia    • Thyroid disease    • Urinary incontinence    • Urinary tract infection        Past Surgical History:   Procedure Laterality Date   • ABDOMINAL HYSTERECTOMY     • COLONOSCOPY     • EYE SURGERY     • HYSTERECTOMY     • HYSTERECTOMY     • IR DRAINAGE TUBE PLACEMENT  6/12/2020   • SMALL INTESTINE SURGERY     • US GUIDED VASCULAR ACCESS  1/18/2017       Social History     Tobacco Use   Smoking Status Never   Smokeless Tobacco Never       Family History   Problem Relation Age of Onset   • Cancer Father    • Colon cancer Father    • Asthma Mother    • Stroke Mother    • WESTLEY disease Mother    • Arthritis Mother    • Diabetes type II Mother    • Mitral valve prolapse Mother    • Irritable bowel syndrome Mother    • Heart disease Mother         Allergies   Allergen Reactions   • Bethanechol      Reaction Date: 10Dec2007; SEIZURES     • Metoclopramide      Reaction Date: 10Dec2007; SEIZURES     • Sulfamethoxazole-Trimethoprim      Reaction Date: 10Dec2007; SEIZURES           Current Outpatient Medications:   •  acetaminophen (TYLENOL) 325 mg tablet, Take 650 mg by mouth 2 (two) times a day, Disp: , Rfl:   •  Albuterol Sulfate (albuterol, FOR EMS ONLY,) (2 5 mg/3 mL) 0 083 % nebulizer solution, Take 2 5 mg by nebulization every 6 (six) hours as needed Sob or wheeze, Disp: , Rfl:   •  aspirin 81 mg chewable tablet, Chew 1 tablet (81 mg total) daily for 7 days, Disp: 7 tablet, Rfl: 0  •  bisacodyl (DULCOLAX) 10 mg suppository, Insert 10 mg into the rectum daily as needed, Disp: , Rfl:   •  carboxymethylcellulose 1 % ophthalmic solution, Apply 2 drops to eye in the morning, Disp: , Rfl:   •  cholecalciferol (VITAMIN D3) 1,000 units tablet, Take 1,000 Units by mouth daily, Disp: , Rfl:   •  Diclofenac Sodium (VOLTAREN) 1 %, Apply 2 g topically in the morning Upper/lower le, Disp: , Rfl:   •  DULoxetine (CYMBALTA) 30 mg delayed release capsule, Take 30 mg by mouth daily FOR FIBROMYALGIA, Disp: , Rfl:   •  Garlic 8607 MG CAPS, Take 1 capsule by mouth daily, Disp: , Rfl:   •  Lactobacillus TABS, Take 1 tablet by mouth 2 (two) times a day, Disp: , Rfl:   •  lansoprazole (PREVACID) 30 mg capsule, TAKE 1 CAPSULE BY MOUTH EVERY DAY, Disp: 90 capsule, Rfl: 3  •  levothyroxine 75 mcg tablet, Take 75 mcg by mouth daily, Disp: , Rfl:   •  Linzess 145 MCG CAPS, TAKE 1 CAPSULE BY MOUTH EVERY DAY, Disp: 90 capsule, Rfl: 1  •  magnesium hydroxide (MILK OF MAGNESIA) 400 mg/5 mL oral suspension, Take 30 mL by mouth daily as needed, Disp: , Rfl:   •  methocarbamol (ROBAXIN) 500 mg tablet, Take 500 mg by mouth every 6 (six) hours as needed Muscle spasms, Disp: , Rfl:   •  predniSONE 20 mg tablet, Take 20 mg by mouth 2 (two) times a day, Disp: , Rfl:   •  senna (SENOKOT) 8 6 MG tablet, Take 1 tablet by mouth daily, Disp: , Rfl:   •  sodium phosphate-biphosphate (FLEET) 7-19 g 118 mL enema, Insert 1 enema into the rectum once as needed, Disp: , Rfl:   •  traMADol (ULTRAM) 50 mg tablet, Take 25 mg by mouth 2 (two) times a day, Disp: , Rfl:   •  valsartan (DIOVAN) 160 mg tablet, Take 160 mg by mouth daily Hold for SBP <110, Disp: , Rfl:     Updated list was reviewed in [facility] system of facility  /60, HR 72, Wt 111 4lbs, RR 18, Tm 97 6    Physical Exam  Constitutional:       General: She is not in acute distress  HENT:      Head: Normocephalic and atraumatic  Cardiovascular:      Rate and Rhythm: Normal rate and regular rhythm  Pulmonary:      Breath sounds: No wheezing or rhonchi  Comments: Decrease BS  Abdominal:      Palpations: Abdomen is soft  Tenderness: There is no abdominal tenderness  There is no guarding  Musculoskeletal:         General: Swelling (chronic involving dorsum of feet b/l) and deformity (Contractures of all extremities, knees) present  "     Cervical back: Neck supple  Comments: Chronic edema involving dorsum of feet b/l   Skin:     Coloration: Skin is not jaundiced  Comments: Resolving bruising involving right knee, left knee and left hand   Neurological:      Mental Status: She is disoriented  Cranial Nerves: No cranial nerve deficit  Motor: Weakness (Chronic involving extremities from Cerebral Palsy) present  Diagnostic Data:    Labs from 6/21/2023 reviewed  TSH was low at 0 10 WBC count was normal at 7 6 him, hemoglobin 10 8, platelet 426  Creatinine 1, BUN 27, potassium 5 1, sodium 137      Portions of the record may have been created with voice recognition software  Occasional wrong word or \"sound a like\" substitutions may have occurred due to the inherent limitations of voice recognition software  Read the chart carefully and recognize, using context, where substitutions have occurred      This note was electronically signed by Dr Valeria Metzger   "

## 2023-06-24 NOTE — ASSESSMENT & PLAN NOTE
BP log reviewed and within acceptable range   BP trending between 100s/137s / 66s-92s ; 116/66  C/w Valsartan 160 mg daily  Avoid hypotension

## 2023-06-24 NOTE — ASSESSMENT & PLAN NOTE
Nursing reporting pt appears in pain  Pt with increase spasticity, moaning and grimacing   Mother agreeable to start Tramadol 25 mg bid as needed and acetaminophen increased  Note with bruises on knee and upper arms  With known h/o cerebral palsy question if disease progression  Fall and safety precautions   Continue supportive care   Bed in the lowest position +fall mats present

## 2023-06-24 NOTE — ASSESSMENT & PLAN NOTE
TSH was low at 0 10 on 6/21/2023  Levothyroxine dose was reduced from 100 to 75 mcg daily    Repeat TSH in 6 to 8 weeks

## 2023-07-20 ENCOUNTER — NURSING HOME VISIT (OUTPATIENT)
Dept: GERIATRICS | Facility: OTHER | Age: 58
End: 2023-07-20
Payer: MEDICARE

## 2023-07-20 DIAGNOSIS — K21.9 GASTROESOPHAGEAL REFLUX DISEASE, UNSPECIFIED WHETHER ESOPHAGITIS PRESENT: Primary | ICD-10-CM

## 2023-07-20 DIAGNOSIS — I10 ESSENTIAL HYPERTENSION: ICD-10-CM

## 2023-07-20 DIAGNOSIS — G80.9 CEREBRAL PALSY, UNSPECIFIED TYPE (HCC): ICD-10-CM

## 2023-07-20 DIAGNOSIS — R26.2 AMBULATORY DYSFUNCTION: ICD-10-CM

## 2023-07-20 DIAGNOSIS — E03.9 HYPOTHYROIDISM, UNSPECIFIED TYPE: ICD-10-CM

## 2023-07-20 PROCEDURE — 99309 SBSQ NF CARE MODERATE MDM 30: CPT | Performed by: NURSE PRACTITIONER

## 2023-07-21 NOTE — PROGRESS NOTES
[address], [id number] [place] [address], [facility]  [phone]    NAME: Gisele Ramos  AGE: 61 y.o. SEX: female    Progress Note  [facility] 28    Location: [facility]   POS: 28 ([facility])     Patient's care was coordinated with nursing facility staff. Recent vitals, labs, and updated medications were review on [facility] system in facility. Assessment/Plan:    GERD (gastroesophageal reflux disease)  Stable  Continue Lansoprazole 30 mg daily     Ambulatory dysfunction  Bed bound. Masood Rule lift to recliner   Continue fall and safety precaution   Dependent for ADLs/IADLs  Continue with supportive care   PT/OT/SL/SW/DT/ following    Hypothyroidism  6/21/23 TSH low at 0.10. C/w levothyroxine 75 mcg daily  Repeat TSH in 6 to 8 weeks     Essential hypertension  BP log reviewed and within acceptable range   C/w Valsartan 160 mg daily  Avoid hypotension      Cerebral palsy (HCC)  With known history   +UE LE contractures   Dependent for all ADLS/IADLs. bed bound. B/B incontinent. assitive device - skyler lift to to recliner  Continue 24/7 supportive care     Chief complaint / Reason for visit: LTC Acute Visit     History of Present Illness:   Patient is a 62-year old female seen and examined for LTC monthly visit. Received pt lying in bed, resting. At the time of my evaluation pt appears comfortable and is in no acute distress. Remains afebrile. VSS. Appears non toxic. R leg 2+ edema. L leg trace. Requires full assistance with ADLs/IADLs. Skyler lift. B/B incontinent. Bilateral hand and bilateral lower extremity + contractures. Per  pt is consuming around % for all meals. other concerns or issues at this time. Review of Systems:  Per history of present illness, all other systems reviewed and negative.  Other than those noted in HPI    HISTORY:  Medical Hx: Reviewed, unchanged  Family Hx: Reviewed, unchanged  Soc Hx: Reviewed,  unchanged    ALLERGY: Reviewed, unchanged  Allergies Allergen Reactions   • Bethanechol      Reaction Date: 10Dec2007; SEIZURES     • Metoclopramide      Reaction Date: 10Dec2007; SEIZURES     • Sulfamethoxazole-Trimethoprim      Reaction Date: 10Dec2007; SEIZURES        PHYSICAL EXAM:  Visit Vitals  /63   Pulse 69   Temp 97.9 °F (36.6 °C)   Resp 20   Wt 100.6 lb   SpO2 97% Comment: at rest RA   BMI 23.79 kg/m²   OB Status Hysterectomy   Smoking Status Never   BSA 1.51 m²     General: frail and weak   Head: Atraumatic. Normocephalic. Eye Exam: anicteric sclera, no discharge, PERRLA, No injection  Oral Exam: dry mucous membranes. Noted with white patches along tongue. No buccaloropharyngeal erythema, palatine tonsils WNL. Cough   Neck Exam: no anterior cervical lymphadenopathy noted, neck supple  Cardiovascular: regular rate, regular rhythm, no murmurs, rubs, or gallops  Pulmonary: breath sounds diminished with + expiratory wheeze, no rhonchi, no rales. No chest tenderness  Abdominal: soft, non-tender, nondistended, bowel sounds audible x 4 quadrants  : Non distended bladder. B/B incontinent   Extremities and skin: left foot 1+ and right trace edema noted, no rashes. +UE contractures   Neurological: alert. Confused. Contracted     Laboratory results / Imaging reviewed: Hard copy/ies in medical chart: reviewed from [facility]     5/23/23  Wbc 6.0, hgb 10.3, hct 34, platelets 452, BUN 38, Creatinine 1.0, sodium 145, potassium 5.1, calcium 10.0, GFR 57    Current Medications: All medications reviewed and updated in Nursing Home Chart    Please note:  Voice-recognition software may have been used in the preparation of this document. Occasional wrong word or "sound-alike" substitutions may have occurred due to the inherent limitations of voice recognition software. Interpretation should be guided by context.     JONATHAN Alanis  7/20/2023

## 2023-07-22 NOTE — ASSESSMENT & PLAN NOTE
With known history   +UE LE contractures   Dependent for all ADLS/IADLs. bed bound. B/B incontinent.  assitive device - zane lift to to [employer] 24/7 supportive care

## 2023-07-22 NOTE — ASSESSMENT & PLAN NOTE
Bed bound.  Paige Roles lift to recliner   Continue fall and safety precaution   Dependent for ADLs/IADLs  Continue with supportive care   PT/OT/SL/SW/DT/ following

## 2023-08-01 ENCOUNTER — NURSING HOME VISIT (OUTPATIENT)
Dept: GERIATRICS | Facility: OTHER | Age: 58
End: 2023-08-01
Payer: MEDICARE

## 2023-08-01 DIAGNOSIS — R26.2 AMBULATORY DYSFUNCTION: ICD-10-CM

## 2023-08-01 DIAGNOSIS — E03.9 HYPOTHYROIDISM, UNSPECIFIED TYPE: ICD-10-CM

## 2023-08-01 DIAGNOSIS — G80.9 CEREBRAL PALSY, UNSPECIFIED TYPE (HCC): ICD-10-CM

## 2023-08-01 DIAGNOSIS — R53.81 PHYSICAL DECONDITIONING: ICD-10-CM

## 2023-08-01 DIAGNOSIS — R60.0 PEDAL EDEMA: Primary | ICD-10-CM

## 2023-08-01 DIAGNOSIS — I10 ESSENTIAL HYPERTENSION: ICD-10-CM

## 2023-08-01 PROCEDURE — 99309 SBSQ NF CARE MODERATE MDM 30: CPT | Performed by: NURSE PRACTITIONER

## 2023-08-02 PROBLEM — R60.0 PEDAL EDEMA: Status: ACTIVE | Noted: 2023-08-02

## 2023-08-02 NOTE — PROGRESS NOTES
[address], [address] [place] [address] [facility]  [phone]    NAME: Zev Hilario  AGE: 61 y.o. SEX: female    Progress Note  [facility] 28    Location: [facility]   POS: 28 ([facility])     Patient's care was coordinated with nursing facility staff. Recent vitals, labs, and updated medications were review on [facility] system in facility. Assessment/Plan:    Pedal edema  Nursing reporting R/L 2+ pedal edema. Will start gently diuresis with furosemide 40 mg daily for 3 days. Pt on valsartan 160 mg daily. Last K 5.1. will recheck BMP on 8/4  Continue with elevation  Start daily Alexey Stockings remove at bedtime  BMP 8/4/23    Essential hypertension  BP log reviewed and within acceptable range   C/w Valsartan 160 mg daily  Avoid hypotension      Ambulatory dysfunction  Bed bound. Lindsay Cumber lift to recliner   Continue fall and safety precaution   Dependent for ADLs/IADLs  Continue with supportive care   PT/OT/SL/SW/DT/ following    Hypothyroidism  6/21/23 TSH low at 0.10. C/w levothyroxine 75 mcg daily  Repeat TSH in 6 to 8 weeks     Cerebral palsy (HCC)  With known history   +UE LE contractures   Dependent for all ADLS/IADLs. bed bound. B/B incontinent.  assitive device - zane lift to to recliner  Continue 24/7 supportive care     Physical deconditioning  Multifactorial in the setting of acute and chronic medical conditions   Patient with known history of cerebral palsy  Noted UE/LE contractures and rigidity  Bed bound   Continue restorative services   Continue to implement fall and safety precaution   Continue with pressure offloading and assist  Encourage adequate oral hydration and nutrition, continue with daily multivitamins and supplements   Continue with 24-7 supportive    GERD (gastroesophageal reflux disease)  Stable  Continue Lansoprazole 30 mg daily     Chief complaint / Reason for visit: LT Acute Visit     History of Present Illness:   Patient is a 62-year old female seen and examined for LTC acute visit. Nursing reporting bilateral lower extremity edema. Received pt lying in bed, resting. At the time of my evaluation pt appears comfortable and is in no acute distress. On exam noted with R/L 2+ pedal edema. Will start gentle diuresis with furosemide, elevate extremities and daily Alexey stockings. Will monitor renal function, weight and fluid status. Lungs clear with no adventitious sounds. Remains afebrile. VSS. Appears non toxic. Requires full assistance with ADLs/IADLs. Skyler lift. B/B incontinent. Bilateral hand and bilateral lower extremity + contractures. Per [facility] pt is consuming around % for all meals. other concerns or issues at this time. Review of Systems:  Per history of present illness, all other systems reviewed and negative. Other than those noted in HPI    HISTORY:  Medical Hx: Reviewed, unchanged  Family Hx: Reviewed, unchanged  Soc Hx: Reviewed,  unchanged    ALLERGY: Reviewed, unchanged  Allergies   Allergen Reactions   • Bethanechol      Reaction Date: 10Dec2007; SEIZURES     • Metoclopramide      Reaction Date: 10Dec2007; SEIZURES     • Sulfamethoxazole-Trimethoprim      Reaction Date: 10Dec2007; SEIZURES        PHYSICAL EXAM:  Visit Vitals  /73   Pulse 72   Temp 97.9 °F (36.6 °C)   Resp 20   Wt 100.6 lb   SpO2 97% Comment: at rest RA   BMI 23.79 kg/m²   OB Status Hysterectomy   Smoking Status Never   BSA 1.51 m²     General: frail and weak   Head: Atraumatic. Normocephalic. Eye Exam: anicteric sclera, no discharge, PERRLA, No injection  Oral Exam: dry mucous membranes. Noted with white patches along tongue. No buccaloropharyngeal erythema, palatine tonsils WNL. Cough   Neck Exam: no anterior cervical lymphadenopathy noted, neck supple  Cardiovascular: regular rate, regular rhythm, no murmurs, rubs, or gallops  Pulmonary: breath sounds diminished with + expiratory wheeze, no rhonchi, no rales.  No chest tenderness  Abdominal: soft, non-tender, nondistended, bowel sounds audible x 4 quadrants  : Non distended bladder. B/B incontinent   Extremities and skin: R/L 2+ pedal edema. +UE/LE contractures   Neurological: alert. Non verbal at baseline. +generalized weakness     Laboratory results / Imaging reviewed: Hard copy/ies in medical chart: reviewed from      6/21/23  TSH 0.10, WBC 7.6, hgb 10.8, hct 35, platelets 613, creatinine 1.0, BUN 27, sodium 137, potassium 5.1, calcium 9.8, GFR 57    Current Medications: All medications reviewed and updated in Nursing Home Chart    Please note:  Voice-recognition software may have been used in the preparation of this document. Occasional wrong word or "sound-alike" substitutions may have occurred due to the inherent limitations of voice recognition software. Interpretation should be guided by context.     JONATHAN Davis  8/1/2023

## 2023-08-02 NOTE — ASSESSMENT & PLAN NOTE
Nursing reporting R/L 2+ pedal edema. Will start gently diuresis with furosemide 40 mg daily for 3 days. Pt on valsartan 160 mg daily.  Last K 5.1. will recheck BMP on 8/4  Continue with elevation  Start daily Alexey Stockings remove at bedtime  BMP 8/4/23

## 2023-08-02 NOTE — ASSESSMENT & PLAN NOTE
Bed bound.  Betzy Mello lift to recliner   Continue fall and safety precaution   Dependent for ADLs/IADLs  Continue with supportive care   PT/OT/SL/SW/DT/ following

## 2023-08-29 ENCOUNTER — NURSING HOME VISIT (OUTPATIENT)
Dept: GERIATRICS | Facility: OTHER | Age: 58
End: 2023-08-29
Payer: MEDICARE

## 2023-08-29 VITALS
OXYGEN SATURATION: 97 % | BODY MASS INDEX: 20.23 KG/M2 | TEMPERATURE: 96.9 F | HEART RATE: 89 BPM | RESPIRATION RATE: 89 BRPM | WEIGHT: 103.6 LBS | SYSTOLIC BLOOD PRESSURE: 118 MMHG | DIASTOLIC BLOOD PRESSURE: 66 MMHG

## 2023-08-29 DIAGNOSIS — E03.9 HYPOTHYROIDISM, UNSPECIFIED TYPE: Primary | ICD-10-CM

## 2023-08-29 PROCEDURE — 99309 SBSQ NF CARE MODERATE MDM 30: CPT | Performed by: INTERNAL MEDICINE

## 2023-08-29 NOTE — ASSESSMENT & PLAN NOTE
TSH was low at 0.10 on 6/21/2023. Levothyroxine dose was reduced from 100 to 75 mcg daily.   Repeat TSH done on 8/3/2023 was stable at 1.53

## 2023-08-29 NOTE — PROGRESS NOTES
[address] Sebastien Cardenas [address] [place], [place], [address] [facility]    Progress Note  [facility] 32    Patient Location     [facility] rehab    Reason for visit     F.U Hypothyroidism,HTN, Vit D deficiency,dysphagia    Patient’s care was coordinated with nursing facility staff. Recent vitals, labs and updated medications were reviewed on [facility] system of facility. Problem List Items Addressed This Visit        Endocrine    Hypothyroidism - Primary     TSH was low at 0.10 on 6/21/2023. Levothyroxine dose was reduced from 100 to 75 mcg daily. Repeat TSH done on 8/3/2023 was stable at 1.53            Essential hypertension:  Blood pressure is stable on valsartan 160 mg daily    Chronic constipation:  Continue Linzess Senokot 1 tablet twice daily and docusate sodium 100 mg daily     GERD:  Continue lansoprazole     Cerebral palsy:  Continue supportive care    Vitamin D deficiency:  Continue vitamin D supplements    Dysphagia:  Patient remains at risk for aspiration. Continue modified diet    HPI         Patient is being seen for a follow-up visit today. She is doing okay at present. Remains confused unable to provide any history. Patient is dependent for all ADLs. Patient had a significant weight loss of around 14 pounds during the last 6 months. P.o. intake has improved during the last few months. Meal consumption is 75 to 100%. Patient remains on modified diet due to dysphagia and aspiration risk. She is being followed by dietitian. Patient has gained 3 pounds since last month. Review of Systems   Constitutional: Negative for chills and fever. HENT: Positive for trouble swallowing. Respiratory: Negative for cough and wheezing. Cardiovascular: Negative for chest pain and leg swelling. Gastrointestinal: Negative for abdominal pain and vomiting. Genitourinary: Negative for hematuria. Musculoskeletal: Positive for gait problem. Neurological: Positive for weakness. Negative for seizures and syncope. Psychiatric/Behavioral: Positive for confusion. Past Medical History:   Diagnosis Date   • Arthritis    • Blood pressure instability UNKNOWN   • Bowel obstruction (HCC)     intestine   • Bradycardia    • Cerebral palsy [facility])    • Chronic edema    • Closed fracture of coccyx (HCC)    • DVT (deep venous thrombosis) ([address])     US Doppler done 10/2015 and 3/2016   • GERD (gastroesophageal reflux disease)    • History of Doppler ultrasound     of UE and LE; 10/2015 and 3/2016 DVT   • History of echocardiogram 03/2016    EF 55%. Trace mitral and tricuspid regurgitation. PA systolic pressure 33. Echo Doppler 2/2017- EF 60%. • History of EKG 10/20/2016    Normal sinus rhythm with sinus arrhythmia. Abnormal ECG    • History of Holter monitoring 11/2017    14 DAY MONITOR Baseline rhythm was of sinus origin with rare APCs and VPCs and a few baseline motion artifact. NO OTHER ARRHYTHMIAS. No symptoms was reported in the attached diary. • History of Holter monitoring 10/2016    Sinus rhythm. There were a few episodes of asymptomatic sinus arrhythmia. There was sinus bradycardia at rate as low as 47 bpm. There were rare single PACs and PVCs.     • Hypertension    • Hyperthyroidism    • Intellectual disability    • Kidney stone    • Lymphedema    • Pneumonia    • Stroke ([address])     UNCERTAIN OF TYPE/NON VERBAL   • Tachycardia    • Thyroid disease    • Urinary incontinence    • Urinary tract infection        Past Surgical History:   Procedure Laterality Date   • ABDOMINAL HYSTERECTOMY     • COLONOSCOPY     • EYE SURGERY     • HYSTERECTOMY     • HYSTERECTOMY     • IR DRAINAGE TUBE PLACEMENT  6/12/2020   • SMALL INTESTINE SURGERY     • US GUIDED VASCULAR ACCESS  1/18/2017       Social History     Tobacco Use   Smoking Status Never   Smokeless Tobacco Never       Family History   Problem Relation Age of Onset   • Cancer Father    • Colon cancer Father • Asthma Mother    • Stroke Mother    • WESTLEY disease Mother    • Arthritis Mother    • Diabetes type II Mother    • Mitral valve prolapse Mother    • Irritable bowel syndrome Mother    • Heart disease Mother         Allergies   Allergen Reactions   • Bethanechol      Reaction Date: 10Dec2007; SEIZURES     • Metoclopramide      Reaction Date: 10Dec2007; SEIZURES     • Sulfamethoxazole-Trimethoprim      Reaction Date: 10Dec2007; SEIZURES           Current Outpatient Medications:   •  acetaminophen (TYLENOL) 325 mg tablet, Take 650 mg by mouth 2 (two) times a day, Disp: , Rfl:   •  Albuterol Sulfate (albuterol, FOR EMS ONLY,) (2.5 mg/3 mL) 0.083 % nebulizer solution, Take 2.5 mg by nebulization every 6 (six) hours as needed Sob or wheeze, Disp: , Rfl:   •  aspirin 81 mg chewable tablet, Chew 1 tablet (81 mg total) daily for 7 days, Disp: 7 tablet, Rfl: 0  •  bisacodyl (DULCOLAX) 10 mg suppository, Insert 10 mg into the rectum daily as needed, Disp: , Rfl:   •  carboxymethylcellulose 1 % ophthalmic solution, Apply 2 drops to eye in the morning, Disp: , Rfl:   •  cholecalciferol (VITAMIN D3) 1,000 units tablet, Take 1,000 Units by mouth daily, Disp: , Rfl:   •  Diclofenac Sodium (VOLTAREN) 1 %, Apply 2 g topically in the morning Upper/lower le, Disp: , Rfl:   •  DULoxetine (CYMBALTA) 30 mg delayed release capsule, Take 30 mg by mouth daily FOR FIBROMYALGIA, Disp: , Rfl:   •  Garlic 8811 MG CAPS, Take 1 capsule by mouth daily, Disp: , Rfl:   •  Lactobacillus TABS, Take 1 tablet by mouth 2 (two) times a day, Disp: , Rfl:   •  lansoprazole (PREVACID) 30 mg capsule, TAKE 1 CAPSULE BY MOUTH EVERY DAY, Disp: 90 capsule, Rfl: 3  •  levothyroxine 75 mcg tablet, Take 75 mcg by mouth daily, Disp: , Rfl:   •  Linzess 145 MCG CAPS, TAKE 1 CAPSULE BY MOUTH EVERY DAY, Disp: 90 capsule, Rfl: 1  •  magnesium hydroxide (MILK OF MAGNESIA) 400 mg/5 mL oral suspension, Take 30 mL by mouth daily as needed, Disp: , Rfl:   •  methocarbamol (ROBAXIN) 500 mg tablet, Take 500 mg by mouth every 6 (six) hours as needed Muscle spasms, Disp: , Rfl:   •  predniSONE 20 mg tablet, Take 20 mg by mouth 2 (two) times a day, Disp: , Rfl:   •  senna (SENOKOT) 8.6 MG tablet, Take 1 tablet by mouth daily, Disp: , Rfl:   •  sodium phosphate-biphosphate (FLEET) 7-19 g 118 mL enema, Insert 1 enema into the rectum once as needed, Disp: , Rfl:   •  traMADol (ULTRAM) 50 mg tablet, Take 25 mg by mouth 2 (two) times a day, Disp: , Rfl:   •  valsartan (DIOVAN) 160 mg tablet, Take 160 mg by mouth daily Hold for SBP <110, Disp: , Rfl:     Updated list was reviewed in [facility] system of facility. Vitals:    08/29/23 1133   BP: 118/66   Pulse: 89   Resp: (!) 89   Temp: (!) 96.9 °F (36.1 °C)   SpO2: 97%       Physical Exam  Constitutional:       General: She is not in acute distress. HENT:      Head: Normocephalic and atraumatic. Eyes:      General: No scleral icterus. Cardiovascular:      Rate and Rhythm: Normal rate and regular rhythm. Pulmonary:      Breath sounds: No wheezing or rales. Abdominal:      General: There is no distension. Palpations: Abdomen is soft. Tenderness: There is no abdominal tenderness. Musculoskeletal:         General: Deformity (Contractures of all 4 extremities including knees and hands) present. Cervical back: Neck supple. Comments: Edema overlying the dorsum of feet bilaterally   Skin:     Coloration: Skin is not jaundiced. Neurological:      Mental Status: She is disoriented. Motor: Weakness (Chronic weakness of all extremities related to cerebral palsy) present. Diagnostic Data:    Recent labs were reviewed. Additional Notes:     Portions of the record may have been created with voice recognition software. Occasional wrong word or "sound a like" substitutions may have occurred due to the inherent limitations of voice recognition software.   Read the chart carefully and recognize, using context, where substitutions have occurred.     This note was electronically signed by Dr. Rocio Macdonald

## 2023-10-04 ENCOUNTER — NURSING HOME VISIT (OUTPATIENT)
Dept: GERIATRICS | Facility: OTHER | Age: 58
End: 2023-10-04
Payer: MEDICARE

## 2023-10-04 DIAGNOSIS — R60.0 PEDAL EDEMA: ICD-10-CM

## 2023-10-04 DIAGNOSIS — I10 ESSENTIAL HYPERTENSION: ICD-10-CM

## 2023-10-04 DIAGNOSIS — G80.9 CEREBRAL PALSY, UNSPECIFIED TYPE (HCC): ICD-10-CM

## 2023-10-04 DIAGNOSIS — K21.9 GASTROESOPHAGEAL REFLUX DISEASE, UNSPECIFIED WHETHER ESOPHAGITIS PRESENT: ICD-10-CM

## 2023-10-04 DIAGNOSIS — E03.9 HYPOTHYROIDISM, UNSPECIFIED TYPE: Primary | ICD-10-CM

## 2023-10-04 DIAGNOSIS — R53.81 PHYSICAL DECONDITIONING: ICD-10-CM

## 2023-10-04 PROCEDURE — 99309 SBSQ NF CARE MODERATE MDM 30: CPT | Performed by: NURSE PRACTITIONER

## 2023-10-04 RX ORDER — ALBUTEROL SULFATE 2.5 MG/3ML
2.5 SOLUTION RESPIRATORY (INHALATION) EVERY 6 HOURS PRN
COMMUNITY

## 2023-10-04 RX ORDER — DOCUSATE SODIUM 100 MG/1
100 CAPSULE, LIQUID FILLED ORAL DAILY
COMMUNITY

## 2023-10-05 NOTE — PROGRESS NOTES
[address], [address] [place] [address] [facility] [address]  [phone]    NAME: Prasad Ortiz  AGE: 61 y.o. SEX: female    Progress Note  [facility] 28    Location: [facility]   POS: 28 ([facility])     Patient's care was coordinated with nursing facility staff. Recent vitals, labs, and updated medications were review on [facility] system in facility. Assessment/Plan:    . Hypothyroidism  TSH WNL 1.53 on 8/3/2023. C/w Levothyroxine 75 mcg daily    Essential hypertension  BP log reviewed and within acceptable range, 129/67. Goal 130-140  C/w Valsartan 160 mg daily  Avoid hypotension      Cerebral palsy (HCC)  With known history   +UE LE contractures   Dependent for all ADLS/IADLs. bed bound. B/B incontinent. assitive device - zane lift to to recliner  Continue 24/7 supportive care     Physical deconditioning  Multifactorial in the setting of acute and chronic medical conditions   Patient with known history of cerebral palsy  Noted UE/LE contractures and rigidity  Bed bound   Continue restorative services   Continue to implement fall and safety precaution   Continue with pressure offloading and assist  Encourage adequate oral hydration and nutrition, continue with daily multivitamins and supplements   Continue with 24-7 supportive        Pedal edema  Noted with R 2+ L 1+ pedal edema. Per mom has improved. Will continue elevation. May benefit from wraps remove at bedtime      GERD (gastroesophageal reflux disease)  Stable  Continue Lansoprazole 30 mg daily   Chief complaint / Reason for visit: LTC Acute Visit     History of Present Illness:   Patient is a 62-year old female seen and examined for [facility] Monthly visit. Mother present during examination. Received pt lying in recliner, resting. At the time of my evaluation pt appears comfortable and is in no acute distress. On exam noted with R 2+ pedal edema and L 1+ pedal edema. Per mom has improved. will continue elevation for now.  Pt's mom reports fair appetite. Remains afebrile. VSS. Appears non toxic. Requires full assistance with ADLs/IADLs. Skyler lift. B/B incontinent. Bilateral hand and bilateral lower extremity + contractures. Per  pt is consuming around % for all meals. other concerns or issues at this time. Review of Systems:  Per history of present illness, all other systems reviewed and negative. Other than those noted in HPI    HISTORY:  Medical Hx: Reviewed, unchanged  Family Hx: Reviewed, unchanged  Soc Hx: Reviewed,  unchanged    ALLERGY: Reviewed, unchanged  Allergies   Allergen Reactions   • Bethanechol      Reaction Date: 10Dec2007; SEIZURES     • Metoclopramide      Reaction Date: 10Dec2007; SEIZURES     • Sulfamethoxazole-Trimethoprim      Reaction Date: 10Dec2007; SEIZURES        PHYSICAL EXAM:  Visit Vitals  /67   Pulse 77   Temp 97.9 °F (36.6 °C)   Resp 20   Wt 110.6 lb   SpO2 97% Comment: at rest RA   BMI 23.79 kg/m²   OB Status Hysterectomy   Smoking Status Never   BSA 1.51 m²     General: frail and weak   Head: Atraumatic. Normocephalic. Eye Exam: anicteric sclera, no discharge, PERRLA, No injection  Oral Exam: dry mucous membranes. Noted with white patches along tongue. No buccaloropharyngeal erythema, palatine tonsils WNL. Cough   Neck Exam: no anterior cervical lymphadenopathy noted, neck supple  Cardiovascular: regular rate, regular rhythm, no murmurs, rubs, or gallops  Pulmonary: breath sounds diminished with + expiratory wheeze, no rhonchi, no rales. No chest tenderness  Abdominal: soft, non-tender, nondistended, bowel sounds audible x 4 quadrants  : Non distended bladder. B/B incontinent   Extremities and skin: R2+ L1+ pedal edema. +UE/LE contractures   Neurological: alert.  Non verbal at baseline. +generalized weakness     Laboratory results / Imaging reviewed: Hard copy/ies in medical chart: reviewed from      8/3/23  TSH 1.53    6/21/23  WBC 7.6, hgb 10.8, hct 35, platelets 404, creatinine 1.0, BUN 27, sodium 137, potassium 5.1, calcium 9.8, GFR 57    Current Medications: All medications reviewed and updated in Nursing Home Chart    Please note:  Voice-recognition software may have been used in the preparation of this document. Occasional wrong word or "sound-alike" substitutions may have occurred due to the inherent limitations of voice recognition software. Interpretation should be guided by context.     Corrie Pratt, [address] [place] [address]  10/4/2023

## 2023-10-05 NOTE — ASSESSMENT & PLAN NOTE
Noted with R 2+ L 1+ pedal edema. Per mom has improved. Will continue elevation.  May benefit from wraps remove at bedtime

## 2023-10-05 NOTE — ASSESSMENT & PLAN NOTE
BP log reviewed and within acceptable range, 129/67.  Goal 130-140  C/w Valsartan 160 mg daily  Avoid hypotension

## 2023-11-03 ENCOUNTER — NURSING HOME VISIT (OUTPATIENT)
Dept: GERIATRICS | Facility: OTHER | Age: 58
End: 2023-11-03
Payer: MEDICARE

## 2023-11-03 VITALS
RESPIRATION RATE: 18 BRPM | OXYGEN SATURATION: 98 % | WEIGHT: 101.6 LBS | BODY MASS INDEX: 19.84 KG/M2 | TEMPERATURE: 97.9 F | HEART RATE: 70 BPM | DIASTOLIC BLOOD PRESSURE: 54 MMHG | SYSTOLIC BLOOD PRESSURE: 116 MMHG

## 2023-11-03 DIAGNOSIS — E03.9 HYPOTHYROIDISM, UNSPECIFIED TYPE: Primary | ICD-10-CM

## 2023-11-03 PROCEDURE — 99309 SBSQ NF CARE MODERATE MDM 30: CPT | Performed by: INTERNAL MEDICINE

## 2023-11-03 NOTE — PROGRESS NOTES
[address] Sebastien Cardenas [address] [place], [place], [address] [facility]    Progress Note  Code [facility] 32    Patient Location     [facility]    Reason for visit     Follow-up cerebral palsy, hypertension, hypothyroidism, GERD    Patient’s care was coordinated with nursing facility staff. Recent vitals, labs and updated medications were reviewed on [facility] system of facility. Problem List Items Addressed This Visit          Endocrine    Hypothyroidism - Primary     Continue levothyroxine 75 mcg daily. TSH was normal at 1.53 on 8/3/2023            Essential hypertension:  Blood pressure stable on valsartan 160 mg daily    Cerebral palsy:  Continue supportive care    Chronic constipation:  Patient remains on Senokot twice a day and Linzess 145 mcg daily    Vitamin D deficiency:  Continue vitamin D supplements    Dysphagia:  Patient remains at risk for aspiration. Continue modified diet and aspiration precautions    GERD:  Stable on lansoprazole    HPI       Patient is being seen for a follow-up visit today. Appears comfortable in no distress. She is unable to provide any history. No active issues have been reported by the nursing staff. Patient has lost around 20 pounds during the last 6 months. Per records recent consumption is between 25 to 75%. Patient has tendency to refuse supplements. She has been followed by dietitian. Patient is dependent for all ADLs, remains incontinent of bowel and bladder      Review of Systems   Constitutional:  Negative for chills and fever. HENT:  Positive for trouble swallowing (at times). Respiratory:  Negative for shortness of breath, wheezing and stridor. Cardiovascular:  Negative for leg swelling. Gastrointestinal:  Negative for abdominal distention and vomiting. Genitourinary:  Negative for flank pain and hematuria. Musculoskeletal:  Positive for gait problem. Neurological:  Positive for weakness. Psychiatric/Behavioral:  Positive for confusion. Past Medical History:   Diagnosis Date    Arthritis     Blood pressure instability UNKNOWN    Bowel obstruction (HCC)     intestine    Bradycardia     Cerebral palsy (HCC)     Chronic edema     Closed fracture of coccyx (HCC)     DVT (deep venous thrombosis) ([address])     US Doppler done 10/2015 and 3/2016    GERD (gastroesophageal reflux disease)     History of Doppler ultrasound     of UE and LE; 10/2015 and 3/2016 DVT    History of echocardiogram 03/2016    EF 55%. Trace mitral and tricuspid regurgitation. PA systolic pressure 33. Echo Doppler 2/2017- EF 60%. History of EKG 10/20/2016    Normal sinus rhythm with sinus arrhythmia. Abnormal ECG     History of Holter monitoring 11/2017    14 DAY MONITOR Baseline rhythm was of sinus origin with rare APCs and VPCs and a few baseline motion artifact. NO OTHER ARRHYTHMIAS. No symptoms was reported in the attached diary. History of Holter monitoring 10/2016    Sinus rhythm. There were a few episodes of asymptomatic sinus arrhythmia. There was sinus bradycardia at rate as low as 47 bpm. There were rare single PACs and PVCs.      Hypertension     Hyperthyroidism     Intellectual disability     Kidney stone     Lymphedema     Pneumonia     Stroke ([address])     UNCERTAIN OF TYPE/NON VERBAL    Tachycardia     Thyroid disease     Urinary incontinence     Urinary tract infection        Past Surgical History:   Procedure Laterality Date    ABDOMINAL HYSTERECTOMY      COLONOSCOPY      EYE SURGERY      HYSTERECTOMY      HYSTERECTOMY      IR DRAINAGE TUBE PLACEMENT  6/12/2020    SMALL INTESTINE SURGERY      US GUIDED VASCULAR ACCESS  1/18/2017       Social History     Tobacco Use   Smoking Status Never   Smokeless Tobacco Never       Family History   Problem Relation Age of Onset    Cancer Father     Colon cancer Father     Asthma Mother     Stroke Mother     WESTLEY disease Mother     Arthritis Mother     Diabetes type II Mother Mitral valve prolapse Mother     Irritable bowel syndrome Mother     Heart disease Mother         Allergies   Allergen Reactions    Bethanechol      Reaction Date: 10Dec2007; SEIZURES      Metoclopramide      Reaction Date: 10Dec2007; SEIZURES      Sulfamethoxazole-Trimethoprim      Reaction Date: 10Dec2007; SEIZURES           Current Outpatient Medications:     Acetaminophen 500 MG, Take 1,000 mg by mouth 3 (three) times a day, Disp: , Rfl:     albuterol (2.5 mg/3 mL) 0.083 % nebulizer solution, Take 2.5 mg by nebulization every 6 (six) hours as needed, Disp: , Rfl:     aspirin 81 mg chewable tablet, Chew 1 tablet (81 mg total) daily for 7 days, Disp: 7 tablet, Rfl: 0    bisacodyl (DULCOLAX) 10 mg suppository, Insert 10 mg into the rectum daily as needed, Disp: , Rfl:     carboxymethylcellulose 1 % ophthalmic solution, Apply 2 drops to eye in the morning, Disp: , Rfl:     cholecalciferol (VITAMIN D3) 1,000 units tablet, Take 1,000 Units by mouth daily, Disp: , Rfl:     Diclofenac Sodium (VOLTAREN) 1 %, Apply 2 g topically 3 (three) times a day, Disp: , Rfl:     docusate sodium (COLACE) 100 mg capsule, Take 100 mg by mouth in the morning, Disp: , Rfl:     DULoxetine (CYMBALTA) 30 mg delayed release capsule, Take 30 mg by mouth daily FOR FIBROMYALGIA, Disp: , Rfl:     Garlic 3522 MG CAPS, Take 1 capsule by mouth daily, Disp: , Rfl:     Lactobacillus TABS, Take 1 tablet by mouth 2 (two) times a day, Disp: , Rfl:     lansoprazole (PREVACID) 30 mg capsule, TAKE 1 CAPSULE BY MOUTH EVERY DAY, Disp: 90 capsule, Rfl: 3    levothyroxine 75 mcg tablet, Take 75 mcg by mouth daily, Disp: , Rfl:     Linzess 145 MCG CAPS, TAKE 1 CAPSULE BY MOUTH EVERY DAY, Disp: 90 capsule, Rfl: 1    magnesium hydroxide (MILK OF MAGNESIA) 400 mg/5 mL oral suspension, Take 30 mL by mouth daily as needed, Disp: , Rfl:     senna (SENOKOT) 8.6 MG tablet, Take 1 tablet by mouth 2 (two) times a day, Disp: , Rfl:     sodium phosphate-biphosphate (FLEET) 7-19 g 118 mL enema, Insert 1 enema into the rectum once as needed, Disp: , Rfl:     valsartan (DIOVAN) 160 mg tablet, Take 160 mg by mouth daily Hold for SBP <110, Disp: , Rfl:     Updated list was reviewed in [facility] of facility. Vitals:    11/03/23 1244   BP: 116/54   Pulse: 70   Resp: 18   Temp: 97.9 °F (36.6 °C)   SpO2: 98%       Physical Exam  Constitutional:       General: She is not in acute distress. HENT:      Head: Normocephalic and atraumatic. Eyes:      General: No scleral icterus. Cardiovascular:      Rate and Rhythm: Normal rate and regular rhythm. Pulmonary:      Breath sounds: No wheezing, rhonchi or rales. Abdominal:      General: There is no distension. Palpations: Abdomen is soft. Tenderness: There is no abdominal tenderness. There is no guarding. Musculoskeletal:         General: No deformity. Comments: Mild  chronic edema overlying the dorsum of feet bilaterally   Skin:     Coloration: Skin is not jaundiced. Neurological:      Mental Status: She is disoriented. Motor: Weakness (Involving all extremities due to cerebral palsy) present. Comments: Bilateral wrist drop         Diagnostic Data:  Labs done on 10/24/2023 revealed WBC count of 5.2, hemoglobin of 12.5, platelet count 023  Creatinine 0.6, BUN 19, sodium 138, potassium 4.9  TSH was 1.53 on 8/3/2023    Portions of the record may have been created with voice recognition software. Occasional wrong word or "sound a like" substitutions may have occurred due to the inherent limitations of voice recognition software. Read the chart carefully and recognize, using context, where substitutions have occurred.     This note was electronically signed by Dr. Bartolo Mazariegos

## 2023-12-01 ENCOUNTER — NURSING HOME VISIT (OUTPATIENT)
Dept: GERIATRICS | Facility: OTHER | Age: 58
End: 2023-12-01
Payer: MEDICARE

## 2023-12-01 VITALS
RESPIRATION RATE: 18 BRPM | WEIGHT: 117.6 LBS | BODY MASS INDEX: 22.97 KG/M2 | SYSTOLIC BLOOD PRESSURE: 101 MMHG | DIASTOLIC BLOOD PRESSURE: 59 MMHG | OXYGEN SATURATION: 99 % | TEMPERATURE: 97.7 F | HEART RATE: 62 BPM

## 2023-12-01 DIAGNOSIS — G93.41 METABOLIC ENCEPHALOPATHY: Primary | ICD-10-CM

## 2023-12-01 PROCEDURE — 99305 1ST NF CARE MODERATE MDM 35: CPT | Performed by: INTERNAL MEDICINE

## 2023-12-01 NOTE — ASSESSMENT & PLAN NOTE
Patient was recently hospitalized with altered mental status and fever in the setting of possible UTI and electrolytes abnormality. UA was positive for pyuria. Sodium level was 163 upon admission. Patient was treated with antibiotics and IV fluids. Mental status subsequently improved.   Continue to monitor

## 2023-12-01 NOTE — PROGRESS NOTES
[facility]  [address] [address] [facility], [address]  [id number]    Nursing Home Admission    NAME: Caryl Mesa  AGE: 60 y.o. SEX: female [id number]      Patient Location     [address] [facility]    Patient’s care was coordinated with nursing facility staff. Recent vitals, labs and updated medications were reviewed on [facility] system of facility. Past Medical, surgical, social, medication and allergy history and patient’s previous records reviewed. Assessment/Plan:    Metabolic encephalopathy  Patient was recently hospitalized with altered mental status and fever in the setting of possible UTI and electrolytes abnormality. UA was positive for pyuria. Sodium level was 163 upon admission. Patient was treated with antibiotics and IV fluids. Mental status subsequently improved. Continue to monitor    Hypernatremia:  Sodium level was high at 163 upon admission to the hospital.  She was treated with hypotonic fluids. Sodium level later stabilized. Follow-up repeat BMP. Maintain adequate hydration      Hypothyroidism:  Continue levothyroxine. TSH was normal in August/23    Hypertension:  Valsartan was initially held due to soft blood pressures later resumed. Blood pressure continues to be on the low side at 101/59 today. Decrease valsartan to 80 mg daily with holding parameters    GERD:  Continue PPI    Chronic constipation:  Patient remains on Linzness senna and Colace    Dysphagia:  Patient was recently evaluated by speech therapist.  Dietary modifications were made. She has had ongoing issues with dysphagia. Continue. Diet for now. Maintain aspiration precautions. Follow-up with dietitian    Cerebral palsy:  Continue supportive treatment    Half positive blood cultures:  Patient was noted to have high positive blood cultures with gram-positive cocci at the hospital suspected to be contaminant.   Currently remains afebrile and clinically nontoxic    Chief Complaint     Recent hospitalization for sepsis related to UTI    HPI       Patient is a 60 y.o. female with past medical history significant for cerebral palsy, hypertension, hypothyroidism, ambulatory dysfunction, cognitive developmental delay, chronic constipation arthritis and GERD. Patient was hospitalized on 11/24/2023 with fever and decreased p.o. intake. She was diagnosed with sepsis suspected to be from UTI. UA revealed pyuria. Patient was initially given vancomycin and cefepime in the ER, subsequently switched over to Ancef for UTI. Final urine cultures showed mixed uropathogen however due to initial presentation of altered mental status and fever treatment for UTI was continued. One of the 2 blood cultures showed gram-positive cocci suspected to be contaminant. Repeat blood cultures were negative. Respiratory panel including flu/COVID/RSV were all negative. Patient was additionally started on IV fluids for hyponatremia. Sodium level was 163 upon admission. She was seen by nephrology service. Electrolytes within improved. K was low, supplemented. Patient was additionally seen by speech therapist for dysphagia issues. Of note patient has history of 20 pound weight loss in the last 6 months. Dietary modifications were made. .  Diet was continued. Patient's mental status later improved. She was subsequently discharged to [facility] rehab where she normally resides. At the time of my evaluation patient appears comfortable. She is not able to provide any meaningful history due to cerebral palsy, cognitive delay.        Past Medical History:   Diagnosis Date    Arthritis     Blood pressure instability UNKNOWN    Bowel obstruction (HCC)     intestine    Bradycardia     Cerebral palsy (HCC)     Chronic edema     Closed fracture of coccyx (HCC)     DVT (deep venous thrombosis) ([address])     US Doppler done 10/2015 and 3/2016    GERD (gastroesophageal reflux disease)     History of Doppler ultrasound of UE and LE; 10/2015 and 3/2016 DVT    History of echocardiogram 03/2016    EF 55%. Trace mitral and tricuspid regurgitation. PA systolic pressure 33. Echo Doppler 2/2017- EF 60%. History of EKG 10/20/2016    Normal sinus rhythm with sinus arrhythmia. Abnormal ECG     History of Holter monitoring 11/2017    14 DAY MONITOR Baseline rhythm was of sinus origin with rare APCs and VPCs and a few baseline motion artifact. NO OTHER ARRHYTHMIAS. No symptoms was reported in the attached diary. History of Holter monitoring 10/2016    Sinus rhythm. There were a few episodes of asymptomatic sinus arrhythmia. There was sinus bradycardia at rate as low as 47 bpm. There were rare single PACs and PVCs.      Hypertension     Hyperthyroidism     Intellectual disability     Kidney stone     Lymphedema     Pneumonia     Stroke ([address])     UNCERTAIN OF TYPE/NON VERBAL    Tachycardia     Thyroid disease     Urinary incontinence     Urinary tract infection        Past Surgical History:   Procedure Laterality Date    ABDOMINAL HYSTERECTOMY      COLONOSCOPY      EYE SURGERY      HYSTERECTOMY      HYSTERECTOMY      IR DRAINAGE TUBE PLACEMENT  6/12/2020    SMALL INTESTINE SURGERY      US GUIDED VASCULAR ACCESS  1/18/2017       Social History     Tobacco Use   Smoking Status Never   Smokeless Tobacco Never          Family History   Problem Relation Age of Onset    Cancer Father     Colon cancer Father     Asthma Mother     Stroke Mother     WESTLEY disease Mother     Arthritis Mother     Diabetes type II Mother     Mitral valve prolapse Mother     Irritable bowel syndrome Mother     Heart disease Mother         Allergies   Allergen Reactions    Bethanechol      Reaction Date: 10Dec2007; SEIZURES      Metoclopramide      Reaction Date: 10Dec2007; SEIZURES      Sulfamethoxazole-Trimethoprim      Reaction Date: 10Dec2007; SEIZURES            Current Outpatient Medications:     Acetaminophen 500 MG, Take 1,000 mg by mouth 3 (three) times a day, Disp: , Rfl:     albuterol (2.5 mg/3 mL) 0.083 % nebulizer solution, Take 2.5 mg by nebulization every 6 (six) hours as needed, Disp: , Rfl:     aspirin 81 mg chewable tablet, Chew 1 tablet (81 mg total) daily for 7 days, Disp: 7 tablet, Rfl: 0    bisacodyl (DULCOLAX) 10 mg suppository, Insert 10 mg into the rectum daily as needed, Disp: , Rfl:     carboxymethylcellulose 1 % ophthalmic solution, Apply 2 drops to eye in the morning, Disp: , Rfl:     cholecalciferol (VITAMIN D3) 1,000 units tablet, Take 1,000 Units by mouth daily, Disp: , Rfl:     Diclofenac Sodium (VOLTAREN) 1 %, Apply 2 g topically 3 (three) times a day, Disp: , Rfl:     docusate sodium (COLACE) 100 mg capsule, Take 100 mg by mouth in the morning, Disp: , Rfl:     DULoxetine (CYMBALTA) 30 mg delayed release capsule, Take 30 mg by mouth daily FOR FIBROMYALGIA, Disp: , Rfl:     Garlic 2866 MG CAPS, Take 1 capsule by mouth daily, Disp: , Rfl:     Lactobacillus TABS, Take 1 tablet by mouth 2 (two) times a day, Disp: , Rfl:     lansoprazole (PREVACID) 30 mg capsule, TAKE 1 CAPSULE BY MOUTH EVERY DAY, Disp: 90 capsule, Rfl: 3    levothyroxine 75 mcg tablet, Take 75 mcg by mouth daily, Disp: , Rfl:     Linzess 145 MCG CAPS, TAKE 1 CAPSULE BY MOUTH EVERY DAY, Disp: 90 capsule, Rfl: 1    magnesium hydroxide (MILK OF MAGNESIA) 400 mg/5 mL oral suspension, Take 30 mL by mouth daily as needed, Disp: , Rfl:     senna (SENOKOT) 8.6 MG tablet, Take 1 tablet by mouth 2 (two) times a day, Disp: , Rfl:     sodium phosphate-biphosphate (FLEET) 7-19 g 118 mL enema, Insert 1 enema into the rectum once as needed, Disp: , Rfl:     valsartan (DIOVAN) 160 mg tablet, Take 160 mg by mouth daily Hold for SBP <110, Disp: , Rfl:     Updated list was reviewed in Levine, Susan. \[facility]ospital [facility] and Outlook.\"" system of [facility].      Vitals:    12/01/23 0807   BP: 101/59   Pulse: 62   Resp: 18   Temp: 97.7 °F (36.5 °C)   SpO2: 99%       Vital signs were reviewed in point click care    Review of Systems Constitutional:  Positive for unexpected weight change (Weight loss of around 20 pounds during the last 6 months). Negative for chills and fever. HENT:  Positive for trouble swallowing. Respiratory:  Negative for shortness of breath, wheezing and stridor. Cardiovascular:  Negative for chest pain and leg swelling. Gastrointestinal:  Negative for abdominal distention and vomiting. Genitourinary:  Negative for hematuria. Musculoskeletal:  Positive for gait problem. Neurological:  Positive for weakness. Negative for seizures and syncope. Appears to have B/L wrist drop   Psychiatric/Behavioral:  Positive for behavioral problems and confusion. Physical Exam  Constitutional:       General: She is not in acute distress. HENT:      Head: Normocephalic and atraumatic. Eyes:      General: No scleral icterus. Cardiovascular:      Rate and Rhythm: Normal rate and regular rhythm. Pulmonary:      Breath sounds: No wheezing, rhonchi or rales. Abdominal:      General: There is no distension. Palpations: Abdomen is soft. Tenderness: There is no abdominal tenderness. There is no guarding. Musculoskeletal:         General: Deformity (Contractures involving bilateral upper and lower extremities, more pronounced in upper extremities) present. Cervical back: Neck supple. Right lower leg: Edema present. Left lower leg: Edema present. Comments: Chronic edema involving dorsum of feet bilaterally   Skin:     Coloration: Skin is not jaundiced. Neurological:      Motor: Weakness present. Psychiatric:         Mood and Affect: Mood normal.           Diagnostic Data       Recent labs and imaging studies were reviewed. Hemoglobin 8.9, WBC count 8, platelet count 213  Creatinine 0.59, BUN 29, potassium 4.2    CT ABDOMEN PELVIS W IV CONTRAST ONLY    Result Date: 11/24/2023  Impression: 1. No acute abdominal or pelvic pathology. No antegrade bowel obstruction.  Large stool volume within the colon. No definite findings to suggest appendicitis or diverticulitis. 2. Other findings as above. Code Status:      Full code           Portions of the record may have been created with voice recognition software. Occasional wrong word or "sound a like" substitutions may have occurred due to the inherent limitations of voice recognition software. Read the chart carefully and recognize, using context, where substitutions have occurred.     This note was electronically signed by Dr. Shazia Barton

## 2023-12-02 ENCOUNTER — TELEPHONE (OUTPATIENT)
Dept: OTHER | Facility: OTHER | Age: 58
End: 2023-12-02

## 2023-12-02 ENCOUNTER — HOSPITAL ENCOUNTER (EMERGENCY)
Facility: HOSPITAL | Age: 58
Discharge: HOME | End: 2023-12-02
Attending: EMERGENCY MEDICINE | Admitting: EMERGENCY MEDICINE
Payer: MEDICARE

## 2023-12-02 VITALS
RESPIRATION RATE: 18 BRPM | OXYGEN SATURATION: 98 % | HEART RATE: 96 BPM | SYSTOLIC BLOOD PRESSURE: 124 MMHG | WEIGHT: 122.58 LBS | TEMPERATURE: 98.5 F | DIASTOLIC BLOOD PRESSURE: 81 MMHG | BODY MASS INDEX: 23.94 KG/M2

## 2023-12-02 DIAGNOSIS — Z01.89 ENCOUNTER FOR LABORATORY TEST: Primary | ICD-10-CM

## 2023-12-02 LAB
ANION GAP SERPL CALCULATED.3IONS-SCNC: 8 MMOL/L
BUN SERPL-MCNC: 15 MG/DL (ref 5–25)
CALCIUM SERPL-MCNC: 8.2 MG/DL (ref 8.4–10.2)
CHLORIDE SERPL-SCNC: 105 MMOL/L (ref 96–108)
CO2 SERPL-SCNC: 27 MMOL/L (ref 21–32)
CREAT SERPL-MCNC: 0.76 MG/DL (ref 0.6–1.3)
GFR SERPL CREATININE-BSD FRML MDRD: 86 ML/MIN/1.73SQ M
GLUCOSE SERPL-MCNC: 116 MG/DL (ref 65–140)
POTASSIUM SERPL-SCNC: 4.1 MMOL/L (ref 3.5–5.3)
SODIUM SERPL-SCNC: 140 MMOL/L (ref 135–147)

## 2023-12-02 PROCEDURE — 36415 COLL VENOUS BLD VENIPUNCTURE: CPT | Performed by: EMERGENCY MEDICINE

## 2023-12-02 PROCEDURE — 80048 BASIC METABOLIC PNL TOTAL CA: CPT | Performed by: EMERGENCY MEDICINE

## 2023-12-02 PROCEDURE — 93005 ELECTROCARDIOGRAM TRACING: CPT

## 2023-12-02 PROCEDURE — 99285 EMERGENCY DEPT VISIT HI MDM: CPT | Performed by: EMERGENCY MEDICINE

## 2023-12-02 PROCEDURE — 99284 EMERGENCY DEPT VISIT MOD MDM: CPT

## 2023-12-02 NOTE — TELEPHONE ENCOUNTER
Christiano Moeller called to notify on call that they are still waiting on [facility] to be drawn. Contacted on call via TC.

## 2023-12-03 LAB
ATRIAL RATE: 102 BPM
P AXIS: 50 DEGREES
PR INTERVAL: 132 MS
QRS AXIS: 4 DEGREES
QRSD INTERVAL: 68 MS
QT INTERVAL: 336 MS
QTC INTERVAL: 437 MS
T WAVE AXIS: 71 DEGREES
VENTRICULAR RATE: 102 BPM

## 2023-12-03 NOTE — ED PROVIDER NOTES
History  Chief Complaint   Patient presents with    Evaluation of Abnormal Diagnostic Test     Pt arrives via EMS from [facility], pt had hemolyzed potassium of 6 yesterday. Unable to repeat labs till tomorrow. Pt nonverbal at baseline. 76-year-old female with history of cerebral palsy, hypertension and cognitive delay presents to the emergency department for evaluation of an abnormal outpatient lab. Patient arrived via EMS who reports that the patient had an elevated potassium level. Per paperwork that arrived with the patient she had a potassium level of 6.0 but the sample was hemolyzed. The patient's mother is here at bedside and states that the patient was discharged from the hospital 3 days ago to the skilled nursing facility. She reports that the patient has been at her baseline health. History and review of systems is limited secondary to the patient's medical history. Prior to Admission Medications   Prescriptions Last Dose Informant Patient Reported? Taking?    Acetaminophen 500 MG   Yes No   Sig: Take 1,000 mg by mouth 3 (three) times a day   DULoxetine (CYMBALTA) 30 mg delayed release capsule   Yes No   Sig: Take 30 mg by mouth daily FOR FIBROMYALGIA   Diclofenac Sodium (VOLTAREN) 1 %   Yes No   Sig: Apply 2 g topically 3 (three) times a day   Garlic 8491 MG CAPS  Mother Yes No   Sig: Take 1 capsule by mouth daily   Linzess 145 MCG CAPS   No No   Sig: TAKE 1 CAPSULE BY MOUTH EVERY DAY   albuterol (2.5 mg/3 mL) 0.083 % nebulizer solution   Yes No   Sig: Take 2.5 mg by nebulization every 6 (six) hours as needed   aspirin 81 mg chewable tablet   No No   Sig: Chew 1 tablet (81 mg total) daily for 7 days   bisacodyl (DULCOLAX) 10 mg suppository   Yes No   Sig: Insert 10 mg into the rectum daily as needed   carboxymethylcellulose 1 % ophthalmic solution   Yes No   Sig: Apply 2 drops to eye in the morning   cholecalciferol (VITAMIN D3) 1,000 units tablet   Yes No   Sig: Take 1,000 Units by mouth daily   docusate sodium (COLACE) 100 mg capsule   Yes No   Sig: Take 100 mg by mouth in the morning   lansoprazole (PREVACID) 30 mg capsule   No No   Sig: TAKE 1 CAPSULE BY MOUTH EVERY DAY   levothyroxine 75 mcg tablet   Yes No   Sig: Take 75 mcg by mouth daily   magnesium hydroxide (MILK OF MAGNESIA) 400 mg/5 mL oral suspension   Yes No   Sig: Take 30 mL by mouth daily as needed   senna (SENOKOT) 8.6 MG tablet   Yes No   Sig: Take 1 tablet by mouth 2 (two) times a day   sodium phosphate-biphosphate (FLEET) 7-19 g 118 mL enema   Yes No   Sig: Insert 1 enema into the rectum once as needed   valsartan (DIOVAN) 160 mg tablet   Yes No   Sig: Take 80 mg by mouth daily Hold for SBP <110      Facility-Administered Medications: None       Past Medical History:   Diagnosis Date    Arthritis     Blood pressure instability UNKNOWN    Bowel obstruction (HCC)     intestine    Bradycardia     Cerebral palsy (HCC)     Chronic edema     Closed fracture of coccyx (HCC)     DVT (deep venous thrombosis) ([facility])     US Doppler done 10/2015 and 3/2016    GERD (gastroesophageal reflux disease)     History of Doppler ultrasound     of UE and LE; 10/2015 and 3/2016 DVT    History of echocardiogram 03/2016    EF 55%. Trace mitral and tricuspid regurgitation. PA systolic pressure 33. Echo Doppler 2/2017- EF 60%. History of EKG 10/20/2016    Normal sinus rhythm with sinus arrhythmia. Abnormal ECG     History of Holter monitoring 11/2017    14 DAY MONITOR Baseline rhythm was of sinus origin with rare APCs and VPCs and a few baseline motion artifact. NO OTHER ARRHYTHMIAS. No symptoms was reported in the attached diary. History of Holter monitoring 10/2016    Sinus rhythm. There were a few episodes of asymptomatic sinus arrhythmia. There was sinus bradycardia at rate as low as 47 bpm. There were rare single PACs and PVCs.      Hypertension     Hyperthyroidism     Intellectual disability     Kidney stone     Lymphedema     Pneumonia Stroke ([address])     UNCERTAIN OF TYPE/NON VERBAL    Tachycardia     Thyroid disease     Urinary incontinence     Urinary tract infection        Past Surgical History:   Procedure Laterality Date    ABDOMINAL HYSTERECTOMY      COLONOSCOPY      EYE SURGERY      HYSTERECTOMY      HYSTERECTOMY      IR DRAINAGE TUBE PLACEMENT  6/12/2020    SMALL INTESTINE SURGERY      US GUIDED VASCULAR ACCESS  1/18/2017       Family History   Problem Relation Age of Onset    Cancer Father     Colon cancer Father     Asthma Mother     Stroke Mother     WESTLEY disease Mother     Arthritis Mother     Diabetes type II Mother     Mitral valve prolapse Mother     Irritable bowel syndrome Mother     Heart disease Mother      I have reviewed and agree with the history as documented. E-Cigarette/Vaping    E-Cigarette Use Never User      E-Cigarette/Vaping Substances     Social History     Tobacco Use    Smoking status: Never    Smokeless tobacco: Never   Vaping Use    Vaping Use: Never used   Substance Use Topics    Alcohol use: No    Drug use: No       Review of Systems   Unable to perform ROS: Patient nonverbal       Physical Exam  Physical Exam  Vitals and nursing note reviewed. Constitutional:       General: She is not in acute distress. Appearance: She is well-developed. HENT:      Head: Normocephalic and atraumatic. Eyes:      Conjunctiva/sclera: Conjunctivae normal.   Cardiovascular:      Rate and Rhythm: Normal rate and regular rhythm. Heart sounds: No murmur heard. Pulmonary:      Effort: Pulmonary effort is normal. No respiratory distress. Breath sounds: Normal breath sounds. Abdominal:      Palpations: Abdomen is soft. Tenderness: There is no abdominal tenderness. Musculoskeletal:         General: No swelling. Cervical back: Neck supple. Skin:     General: Skin is warm and dry. Capillary Refill: Capillary refill takes less than 2 seconds. Neurological:      Mental Status: She is alert. Psychiatric:         Behavior: Behavior normal.         Vital Signs  ED Triage Vitals [12/02/23 2026]   Temperature Pulse Respirations Blood Pressure SpO2   98.5 °F (36.9 °C) 96 18 124/81 98 %      Temp Source Heart Rate Source Patient Position - Orthostatic VS BP Location FiO2 (%)   Axillary Monitor Lying Left arm --      Pain Score       --           Vitals:    12/02/23 2026   BP: 124/81   Pulse: 96   Patient Position - Orthostatic VS: Lying         Visual Acuity      ED Medications  Medications - No data to display    Diagnostic Studies  Results Reviewed       Procedure Component Value Units Date/Time    Basic metabolic panel [[id number]]  (Abnormal) Collected: 12/02/23 2120    Lab Status: Final result Specimen: Blood Updated: 12/02/23 2120     Sodium 140 mmol/L      Potassium 4.1 mmol/L      Chloride 105 mmol/L      CO2 27 mmol/L      ANION GAP 8 mmol/L      BUN 15 mg/dL      Creatinine 0.76 mg/dL      Glucose 116 mg/dL      Calcium 8.2 mg/dL      eGFR 86 ml/min/1.73sq m     Narrative:      [place] guidelines for Chronic Kidney Disease (CKD):     Stage 1 with normal or high GFR (GFR > 90 mL/min/1.73 square meters)    Stage 2 Mild CKD (GFR = 60-89 mL/min/1.73 square meters)    Stage 3A Moderate CKD (GFR = 45-59 mL/min/1.73 square meters)    Stage 3B Moderate CKD (GFR = 30-44 mL/min/1.73 square meters)    Stage 4 Severe CKD (GFR = 15-29 mL/min/1.73 square meters)    Stage 5 End Stage CKD (GFR <15 mL/min/1.73 square meters)  Note: GFR calculation is accurate only with a steady state creatinine                   No orders to display              Procedures  ECG 12 Lead Documentation Only    Date/Time: 12/2/2023 8:56 PM    Performed by: Rafat Arnold MD  Authorized by: Rafat Arnold MD    ECG reviewed by me, the ED Provider: yes    Patient location:  ED  Previous ECG:     Previous ECG:  Unavailable    Comparison to cardiac monitor: Yes    Interpretation:     Interpretation: non-specific    Rate:     ECG rate assessment: tachycardic    Rhythm:     Rhythm: sinus tachycardia    Ectopy:     Ectopy: none    QRS:     QRS axis:  Normal  Conduction:     Conduction: normal    ST segments:     ST segments:  Normal  T waves:     T waves: normal             ED Course  ED Course as of 12/02/23 2142   Sat Dec 02, 2023   2120 Potassium: 4.1                     Medical Decision Making  13-year-old female presented to the emergency department for evaluation of an abnormal outpatient lab. Patient was found to have a potassium level of 6.0 with a hemolyzed sample at her SNF. EKG ordered to evaluate for acute arrhythmia/ischemia. On my interpretation EKG with a sinus rhythm with no acute ischemic changes noted. Repeat blood work here with a potassium of 4.1. Patient at her baseline health per the patient's mother. No acute concerns. The patient is appropriate for discharge back to her SNF. Amount and/or Complexity of Data Reviewed  Labs: ordered. Decision-making details documented in ED Course. ECG/medicine tests: ordered and independent interpretation performed. Disposition  Final diagnoses:   Encounter for laboratory test     Time reflects when diagnosis was documented in both MDM as applicable and the Disposition within this note       Time User Action Codes Description Comment    12/2/2023  9:21 PM Minda Ennis Add [Z01.89] Encounter for laboratory test           ED Disposition       ED Disposition   Discharge    Condition   Stable    Date/Time   Sat Dec 2, 2023  9:25 PM    [address] discharge to home/self care. Follow-up Information       Follow up With Specialties Details Why Contact Info Additional [id number] [facility],  Family Medicine Schedule an appointment as soon as possible for a visit   21-23-83-89.  [address] Emergency Department Emergency Medicine Go to If symptoms worsen [address] [address] [address] [facility], [address] [place] [address] Dr, [address]            Patient's Medications   Discharge Prescriptions    No medications on file       No discharge procedures on file.     PDMP Review       None            ED Provider  Electronically Signed by             Terrell Ayers MD  12/02/23 3360

## 2023-12-03 NOTE — TELEPHONE ENCOUNTER
[phone] ext [phone]/Destiny from [facility] [place]/Follow-up on lab orders. Dr Kathie Davis was paged via TC    Please allow the on-call provider 20-30 mins to respond. If you have not heard from them within that time, please feel free to call back.

## 2023-12-03 NOTE — ED NOTES
Transport arranged by [facility] due to [facility] being unavailable at the moment. Will call back when transport is available.       Fercho Cordova RN  12/02/23 8196

## 2023-12-04 ENCOUNTER — NURSING HOME VISIT (OUTPATIENT)
Dept: GERIATRICS | Facility: OTHER | Age: 58
End: 2023-12-04
Payer: MEDICARE

## 2023-12-04 VITALS
HEART RATE: 77 BPM | OXYGEN SATURATION: 97 % | TEMPERATURE: 97.8 F | SYSTOLIC BLOOD PRESSURE: 99 MMHG | DIASTOLIC BLOOD PRESSURE: 74 MMHG

## 2023-12-04 DIAGNOSIS — I10 ESSENTIAL HYPERTENSION: ICD-10-CM

## 2023-12-04 DIAGNOSIS — E87.5 HYPERKALEMIA: Primary | ICD-10-CM

## 2023-12-04 PROCEDURE — 99309 SBSQ NF CARE MODERATE MDM 30: CPT

## 2023-12-04 RX ORDER — AMLODIPINE BESYLATE 5 MG/1
5 TABLET ORAL DAILY
Start: 2023-12-04

## 2023-12-04 NOTE — ASSESSMENT & PLAN NOTE
Blood pressure soft today  Was on valsartan 160 mg daily  Recently ED visit for hyperkalemia  Will DC valsartan  Start amlodipine 5 mg daily  Monitor blood pressure

## 2023-12-04 NOTE — PROGRESS NOTES
[address]  [phone]  FACILITY: [facility]  Code 32 ([facility])  ED follow-up      NAME: Ascencion Horner  AGE: 60 y.o. SEX: female CODE STATUS: CPR    DATE OF ENCOUNTER: 12/4/2023    Assessment and Plan     1. Hyperkalemia  Assessment & Plan:  Recent ED visit for hyperkalemia with K 6.0 on 12/1  During hospital visit K4.1  Sample at SNF thought to be hemolyzed  She was sent back to [facility] without any interventions  Will DC valsartan and start amlodipine  Repeat BMP      2. Essential hypertension  Assessment & Plan:  Blood pressure soft today  Was on valsartan 160 mg daily  Recently ED visit for hyperkalemia  Will DC valsartan  Start amlodipine 5 mg daily  Monitor blood pressure    Orders:  -     amLODIPine (NORVASC) 5 mg tablet; Take 1 tablet (5 mg total) by mouth daily         All medications and routine orders were reviewed and updated as needed. Chief Complaint     ED follow up visit       Past Medical and Surgica History      Past Medical History:   Diagnosis Date    Arthritis     Blood pressure instability UNKNOWN    Bowel obstruction (HCC)     intestine    Bradycardia     Cerebral palsy ([address])     Chronic edema     Closed fracture of coccyx (HCC)     DVT (deep venous thrombosis) ([address])     US Doppler done 10/2015 and 3/2016    GERD (gastroesophageal reflux disease)     History of Doppler ultrasound     of UE and LE; 10/2015 and 3/2016 DVT    History of echocardiogram 03/2016    EF 55%. Trace mitral and tricuspid regurgitation. PA systolic pressure 33. Echo Doppler 2/2017- EF 60%. History of EKG 10/20/2016    Normal sinus rhythm with sinus arrhythmia. Abnormal ECG     History of Holter monitoring 11/2017    14 DAY MONITOR Baseline rhythm was of sinus origin with rare APCs and VPCs and a few baseline motion artifact. NO OTHER ARRHYTHMIAS. No symptoms was reported in the attached diary. History of Holter monitoring 10/2016    Sinus rhythm.  There were a few episodes of asymptomatic sinus arrhythmia. There was sinus bradycardia at rate as low as 47 bpm. There were rare single PACs and PVCs. Hypertension     Hyperthyroidism     Intellectual disability     Kidney stone     Lymphedema     Pneumonia     Stroke ([address])     UNCERTAIN OF TYPE/NON VERBAL    Tachycardia     Thyroid disease     Urinary incontinence     Urinary tract infection      Past Surgical History:   Procedure Laterality Date    ABDOMINAL HYSTERECTOMY      COLONOSCOPY      EYE SURGERY      HYSTERECTOMY      HYSTERECTOMY      IR DRAINAGE TUBE PLACEMENT  6/12/2020    SMALL INTESTINE SURGERY      US GUIDED VASCULAR ACCESS  1/18/2017     Allergies   Allergen Reactions    Bethanechol      Reaction Date: 10Dec2007; SEIZURES      Metoclopramide      Reaction Date: 10Dec2007; SEIZURES      Sulfamethoxazole-Trimethoprim      Reaction Date: 10Dec2007; SEIZURES      Fentanyl Other (See Comments)     Unknown, mother does not want pt to be given it          History of Present Illness     Patient being seen for ED follow-up visit. She was recently sent to the ED due to potassium of 6.0. On arrival to ED potassium was found to be 4.1. She was subsequently sent back to SNF. She is in no acute distress. She has history of cerebral palsy and is nonverbal at baseline. The patient's allergies, past medical, surgical, social and family history were reviewed and unchanged. Review of Systems     Review of Systems   Unable to perform ROS: Patient nonverbal         Objective     Vitals:   Vitals:    12/04/23 1257   BP: 99/74   Pulse: 77   Temp: 97.8 °F (36.6 °C)   SpO2: 97%         Physical Exam  Vitals reviewed. Constitutional:       General: She is not in acute distress. Appearance: She is not ill-appearing, toxic-appearing or diaphoretic. HENT:      Head: Normocephalic and atraumatic.    Eyes:      Conjunctiva/sclera: Conjunctivae normal.   Cardiovascular:      Rate and Rhythm: Normal rate and regular rhythm. Pulses: Normal pulses. Heart sounds: Normal heart sounds. No murmur heard. Pulmonary:      Effort: Pulmonary effort is normal. No respiratory distress. Breath sounds: Normal breath sounds. Abdominal:      General: Bowel sounds are normal. There is no distension. Palpations: Abdomen is soft. Tenderness: There is no abdominal tenderness. Musculoskeletal:      Right lower leg: No edema. Left lower leg: No edema. Skin:     General: Skin is warm and dry. Neurological:      General: No focal deficit present. Mental Status: Mental status is at baseline. Motor: Weakness present. Gait: Gait abnormal.   Psychiatric:         Speech: She is noncommunicative. Cognition and Memory: Cognition is impaired. Pertinent Laboratory/Diagnostic Studies:   Reviewed in facility chart-stable      Current Medications   Medications reviewed and updated see facility [facility] for details.       Current Outpatient Medications:     amLODIPine (NORVASC) 5 mg tablet, Take 1 tablet (5 mg total) by mouth daily, Disp: , Rfl:     Acetaminophen 500 MG, Take 1,000 mg by mouth 3 (three) times a day, Disp: , Rfl:     albuterol (2.5 mg/3 mL) 0.083 % nebulizer solution, Take 2.5 mg by nebulization every 6 (six) hours as needed, Disp: , Rfl:     aspirin 81 mg chewable tablet, Chew 1 tablet (81 mg total) daily for 7 days, Disp: 7 tablet, Rfl: 0    bisacodyl (DULCOLAX) 10 mg suppository, Insert 10 mg into the rectum daily as needed, Disp: , Rfl:     carboxymethylcellulose 1 % ophthalmic solution, Apply 2 drops to eye in the morning, Disp: , Rfl:     cholecalciferol (VITAMIN D3) 1,000 units tablet, Take 1,000 Units by mouth daily, Disp: , Rfl:     Diclofenac Sodium (VOLTAREN) 1 %, Apply 2 g topically 3 (three) times a day, Disp: , Rfl:     docusate sodium (COLACE) 100 mg capsule, Take 100 mg by mouth in the morning, Disp: , Rfl:     DULoxetine (CYMBALTA) 30 mg delayed release capsule, Take 30 mg by mouth daily FOR FIBROMYALGIA, Disp: , Rfl:     Garlic 2234 MG CAPS, Take 1 capsule by mouth daily, Disp: , Rfl:     lansoprazole (PREVACID) 30 mg capsule, TAKE 1 CAPSULE BY MOUTH EVERY DAY, Disp: 90 capsule, Rfl: 3    levothyroxine 75 mcg tablet, Take 75 mcg by mouth daily, Disp: , Rfl:     Linzess 145 MCG CAPS, TAKE 1 CAPSULE BY MOUTH EVERY DAY, Disp: 90 capsule, Rfl: 1    magnesium hydroxide (MILK OF MAGNESIA) 400 mg/5 mL oral suspension, Take 30 mL by mouth daily as needed, Disp: , Rfl:     senna (SENOKOT) 8.6 MG tablet, Take 1 tablet by mouth 2 (two) times a day, Disp: , Rfl:     sodium phosphate-biphosphate (FLEET) 7-19 g 118 mL enema, Insert 1 enema into the rectum once as needed, Disp: , Rfl:        Please note:  Voice-recognition software may have been used in the preparation of this document. Occasional wrong word or "sound-alike" substitutions may have occurred due to the inherent limitations of voice recognition software. Interpretation should be guided by context.          JONATHAN Paul  12/4/2023  3:46 PM

## 2023-12-04 NOTE — ASSESSMENT & PLAN NOTE
Recent ED visit for hyperkalemia with K 6.0 on 12/1  During hospital visit K4.1  Sample at SNF thought to be hemolyzed  She was sent back to SNF without any interventions  Will DC valsartan and start amlodipine  Repeat BMP

## 2024-01-03 ENCOUNTER — NURSING HOME VISIT (OUTPATIENT)
Dept: GERIATRICS | Facility: OTHER | Age: 59
End: 2024-01-03
Payer: MEDICARE

## 2024-01-03 VITALS
BODY MASS INDEX: 22.26 KG/M2 | WEIGHT: 114 LBS | SYSTOLIC BLOOD PRESSURE: 122 MMHG | DIASTOLIC BLOOD PRESSURE: 63 MMHG | RESPIRATION RATE: 18 BRPM | TEMPERATURE: 97.6 F | OXYGEN SATURATION: 98 % | HEART RATE: 82 BPM

## 2024-01-03 DIAGNOSIS — E87.5 HYPERKALEMIA: ICD-10-CM

## 2024-01-03 DIAGNOSIS — R60.0 PEDAL EDEMA: Primary | ICD-10-CM

## 2024-01-03 DIAGNOSIS — G80.9 CEREBRAL PALSY, UNSPECIFIED TYPE (HCC): ICD-10-CM

## 2024-01-03 DIAGNOSIS — I95.2 HYPOTENSION DUE TO DRUGS: ICD-10-CM

## 2024-01-03 DIAGNOSIS — R26.2 AMBULATORY DYSFUNCTION: ICD-10-CM

## 2024-01-03 DIAGNOSIS — I10 ESSENTIAL HYPERTENSION: ICD-10-CM

## 2024-01-03 PROBLEM — I95.9 HYPOTENSION: Status: ACTIVE | Noted: 2024-01-03

## 2024-01-03 PROCEDURE — 99310 SBSQ NF CARE HIGH MDM 45: CPT | Performed by: NURSE PRACTITIONER

## 2024-01-03 RX ORDER — AMLODIPINE BESYLATE 2.5 MG/1
2.5 TABLET ORAL DAILY
COMMUNITY

## 2024-01-03 NOTE — PROGRESS NOTES
[facility]  [address]   [address]  [place], [address] [phone]    Progress Note  Code [id number]    Patient Location     [facility]     Reason for visit     Follow up     Patient’s care was coordinated with nursing facility staff. Recent vitals, labs and updated medications were reviewed on [facility] system of facility.     Problem List Items Addressed This Visit       Cerebral palsy (HCC)     Skyler lift   Recliner bound  Mute   On puree texture and nectar consistency   +UE LE contractures   Dependent for all ADLs/IADLs. B/B incontinent  Continue 24/7 supportive care          Essential hypertension     Noted to have soft bps in the low 86s  Recently switched from Diovan to amlodipine d/t hyperkalemia in recent visit to ED  Will decrease amlodipine to 2.5 mg daily with hold parameters  Monitor Bps          Ambulatory dysfunction     Bed bound. Skyler lift to recliner   Continue fall and safety precaution   Dependent for ADLs/IADLs  Continue with supportive care   PT/OT/SL/SW/DT/ following         Hyperkalemia     On 12/11/23 K at 5.5 repeat BMP however blood work results are not in PCC. Pt was evaluated in ER and found to be hyperkalemic in blood work in facility however in ED her K was 4.1 no interventions. Recommendations to d/c valsartan and start amlodipine. Will recheck BMP on 1/5/24 order placed   Will follow accordingly          Pedal edema - Primary     Right leg/foot +2/3 edema and left leg/foot trace edema  Patient's mother at bedside reporting, edema was not there following previous days. Start furosemide 40 mg daily x2 days added hold parameters if SBP less than 100 along with KCL  Patient difficult to elevate legs due to medical condition   Nursing continue to do daily wraps remove at HS   Will check BMP on  Friday         Hypotension     Pt was switched from Valsartan to Amlodipine due to hyperkalemia during recent visit to ED. However in PCC log pt is  "noted to have soft ps.  overall SBP ranging between  from 12/28/23-1/3/24. Will decrease amlodipine to 2.5 daily with hold parameter if SBP less than 110 and report to provider. Continue to monitor bp and adjust antihypertensive accordingly           HPI     Patient was evaluated today as patient's mother requested provider to check patient's right leg. At the time of my evaluation patient appears comfortable and is in no acute distress. Right foot and leg noted with 2+ pitting edema. Left leg/foot with trace. Per mother, right leg had some swelling, \"like a trace but not this swelling.\" Additionally patient's mother informs pt seems to be \"out of it at times.\" PCC log reviewed pt noted to have so/ft blood pressures  the lowest one recorded at 86/51 on 12/28/23. Patient's mother reports pt is free of fever or chills. Pt requires full assistance with ADLs/IADLs. Incontinent of bowel and bladder. No other concerns or issues at this time.     Review of Systems   Constitutional:  Negative for chills and fever.   HENT:  Negative for congestion, nosebleeds and trouble swallowing.    Respiratory:  Negative for cough, shortness of breath and wheezing.    Cardiovascular:  Positive for leg swelling.   Gastrointestinal: Negative.    Genitourinary:  Negative for hematuria.   Musculoskeletal:  Positive for gait problem.   Skin:  Negative for color change.   Neurological:  Positive for weakness. Negative for tremors and seizures.   Psychiatric/Behavioral:  Negative for agitation and sleep disturbance.      Past Medical History:   Diagnosis Date    Arthritis     Blood pressure instability UNKNOWN    Bowel obstruction (HCC)     intestine    Bradycardia     Cerebral palsy (HCC)     Chronic edema     Closed fracture of coccyx (HCC)     DVT (deep venous thrombosis) ([facility])     US Doppler done 10/2015 and 3/2016    GERD (gastroesophageal reflux disease)     History of Doppler ultrasound     of UE and LE; 10/2015 and 3/2016 DVT    " History of echocardiogram 03/2016    EF 55%. Trace mitral and tricuspid regurgitation. PA systolic pressure 33. Echo Doppler 2/2017- EF 60%.     History of EKG 10/20/2016    Normal sinus rhythm with sinus arrhythmia. Abnormal ECG     History of Holter monitoring 11/2017    14 DAY MONITOR Baseline rhythm was of sinus origin with rare APCs and VPCs and a few baseline motion artifact. NO OTHER ARRHYTHMIAS. No symptoms was reported in the attached diary.     History of Holter monitoring 10/2016    Sinus rhythm. There were a few episodes of asymptomatic sinus arrhythmia. There was sinus bradycardia at rate as low as 47 bpm. There were rare single PACs and PVCs.     Hypertension     Hyperthyroidism     Intellectual disability     Kidney stone     Lymphedema     Pneumonia     Stroke (HCC)     UNCERTAIN OF TYPE/NON VERBAL    Tachycardia     Thyroid disease     Urinary incontinence     Urinary tract infection        Past Surgical History:   Procedure Laterality Date    ABDOMINAL HYSTERECTOMY      COLONOSCOPY      EYE SURGERY      HYSTERECTOMY      HYSTERECTOMY      IR DRAINAGE TUBE PLACEMENT  6/12/2020    SMALL INTESTINE SURGERY      US GUIDED VASCULAR ACCESS  1/18/2017       Social History     Tobacco Use   Smoking Status Never   Smokeless Tobacco Never       Family History   Problem Relation Age of Onset    Cancer Father     Colon cancer Father     Asthma Mother     Stroke Mother     WESTLEY disease Mother     Arthritis Mother     Diabetes type II Mother     Mitral valve prolapse Mother     Irritable bowel syndrome Mother     Heart disease Mother         Allergies   Allergen Reactions    Bethanechol      Reaction Date: 10Dec2007; SEIZURES      Metoclopramide      Reaction Date: 10Dec2007; SEIZURES      Sulfamethoxazole-Trimethoprim      Reaction Date: 10Dec2007; SEIZURES      Fentanyl Other (See Comments)     Unknown, mother does not want pt to be given it     Updated list was reviewed in [facility] system of facility.      Visit Vitals  /63   Pulse 82   Temp 97.6 °F (36.4 °C)   Resp 18   Wt 51.7 kg (114 lb)   SpO2 98% Comment: ra   BMI 22.26 kg/m²   OB Status Hysterectomy   Smoking Status Never   BSA 1.47 m²      Physical Exam  Vitals and nursing note reviewed.   Constitutional:       General: She is not in acute distress.     Appearance: She is ill-appearing (chronically).   HENT:      Head: Normocephalic and atraumatic.      Nose: Nose normal.      Mouth/Throat:      Mouth: Mucous membranes are moist.   Eyes:      General:         Right eye: No discharge.         Left eye: No discharge.   Cardiovascular:      Rate and Rhythm: Normal rate and regular rhythm.      Pulses: Normal pulses.      Heart sounds: Normal heart sounds.   Pulmonary:      Effort: Pulmonary effort is normal. No respiratory distress.      Breath sounds: Normal breath sounds. No wheezing or rales.   Abdominal:      General: Bowel sounds are normal. There is no distension.      Palpations: Abdomen is soft.      Tenderness: There is no abdominal tenderness. There is no guarding.   Genitourinary:     Comments: Incontinent   Musculoskeletal:      Cervical back: Normal range of motion and neck supple. No rigidity or tenderness.      Right lower leg: Edema (2+) present.      Left lower leg: Edema (trace) present.   Skin:     General: Skin is warm.      Coloration: Skin is not jaundiced.      Findings: No bruising, erythema or rash.   Neurological:      General: No focal deficit present.      Mental Status: She is alert. Mental status is at baseline.      Motor: Weakness present.      Gait: Gait abnormal.      Comments: Alert to self     Medications reviewed     Recent labs were reviewed from [facility]    Additional Notes:     I have spent >30 minutes with patient today in which greater than 50% of this time was spent in counseling/coordination of care regarding Diagnostic results, Prognosis, Risks and benefits of tx options, Instructions for management, Patient and  family education, Importance of tx compliance, Risk factor reductions, Impressions, Counseling / Coordination of care, Documenting in the medical record, Reviewing / ordering tests, medicine, procedures  , Obtaining or reviewing history  , and Communicating with other healthcare professionals .      This note was electronically signed by JONATHAN Piña

## 2024-01-04 NOTE — ASSESSMENT & PLAN NOTE
Bed bound. Skyler lift to recliner   Continue fall and safety precaution   Dependent for ADLs/IADLs  Continue with supportive care   PT/OT/SL/SW/DT/ following

## 2024-01-04 NOTE — ASSESSMENT & PLAN NOTE
On 12/11/23 K at 5.5 repeat BMP however blood work results are not in PCC. Pt was evaluated in ER and found to be hyperkalemic in blood work in facility however in ED her K was 4.1 no interventions. Recommendations to d/c valsartan and start amlodipine. Will recheck BMP on 1/5/24 order placed   Will follow accordingly

## 2024-01-04 NOTE — ASSESSMENT & PLAN NOTE
Right leg/foot +2/3 edema and left leg/foot trace edema  Patient's mother at bedside reporting, edema was not there following previous days. Start furosemide 40 mg daily x2 days added hold parameters if SBP less than 100 along with KCL  Patient difficult to elevate legs due to medical condition   Nursing continue to do daily wraps remove at HS   Will check BMP on  Friday

## 2024-01-04 NOTE — ASSESSMENT & PLAN NOTE
Pt was switched from Valsartan to Amlodipine due to hyperkalemia during recent visit to ED. However in PCC log pt is noted to have soft ps.  overall SBP ranging between  from 12/28/23-1/3/24. Will decrease amlodipine to 2.5 daily with hold parameter if SBP less than 110 and report to provider. Continue to monitor bp and adjust antihypertensive accordingly

## 2024-01-04 NOTE — ASSESSMENT & PLAN NOTE
Skyler lift   Recliner bound  Mute   On puree texture and nectar consistency   +UE LE contractures   Dependent for all ADLs/IADLs. B/B incontinent  Continue 24/7 supportive care

## 2024-01-04 NOTE — ASSESSMENT & PLAN NOTE
Noted to have soft bps in the low 86s  Recently switched from Diovan to amlodipine d/t hyperkalemia in recent visit to ED  Will decrease amlodipine to 2.5 mg daily with hold parameters  Monitor Bps

## 2024-01-17 ENCOUNTER — NURSING HOME VISIT (OUTPATIENT)
Dept: GERIATRICS | Facility: OTHER | Age: 59
End: 2024-01-17
Payer: MEDICARE

## 2024-01-17 DIAGNOSIS — E87.5 HYPERKALEMIA: Primary | ICD-10-CM

## 2024-01-17 DIAGNOSIS — G80.9 CEREBRAL PALSY, UNSPECIFIED TYPE (HCC): ICD-10-CM

## 2024-01-17 DIAGNOSIS — R26.2 AMBULATORY DYSFUNCTION: ICD-10-CM

## 2024-01-17 DIAGNOSIS — R53.81 PHYSICAL DECONDITIONING: ICD-10-CM

## 2024-01-17 DIAGNOSIS — R60.0 BILATERAL LOWER EXTREMITY EDEMA: ICD-10-CM

## 2024-01-17 PROCEDURE — 99309 SBSQ NF CARE MODERATE MDM 30: CPT | Performed by: NURSE PRACTITIONER

## 2024-01-18 NOTE — PROGRESS NOTES
[facility]  [address]   [address]  [place], [place], [address] [phone]    Progress Note  Code [facility] 32    Patient Location     [facility]    Reason for visit     [facility] Monthly Visit     Patient’s care was coordinated with nursing facility staff. Recent vitals, labs and updated medications were reviewed on [facility] system of facility.     Problem List Items Addressed This Visit       Physical deconditioning     Multifactorial in the setting of acute and chronic medical conditions   Patient with known history of cerebral palsy  Noted UE/LE contractures and rigidity  Bed bound   Continue restorative services   Continue to implement fall and safety precaution   Continue with pressure offloading and assist  Encourage adequate oral hydration and nutrition, continue with daily multivitamins and supplements   Continue with 24-7 supportive             Cerebral palsy (HCC)     Skyler lift   Recliner bound  Mute   On puree texture and nectar consistency   +UE LE contractures   Dependent for all ADLs/IADLs. B/B incontinent  Continue 24/7 supportive care          Ambulatory dysfunction     Bed bound. Skyler lift to recliner   Continue fall and safety precaution   Dependent for ADLs/IADLs  Continue with supportive care   PT/OT/SL/SW/DT/ following         Hyperkalemia - Primary     12/11/23  slightly elevated at 5.5. re order labs unable to collect due to pt hard stick and moves. Pt was sent to ED however K was noted to be at 4.1.  ER d/c valsartan and started Amlodipine. Subsequently Amlodipine was d/c due to soft bps. Will coordinate with nursing to obtain blood work.  Pt appears comfortable and is in no acute distress  Collaborated care with nursing and mom          Bilateral lower extremity edema     Noted with R leg/foot 1+ and L leg/foot trace edema  Continue with wraps   Pt was treated with lasix x2   Will monitor now on wraps               HPI     Patient is a 58-yr old female  seen and examined for acute and chronic medical conditions. At the time of my evaluation patient appears ok. Pt is mute. Pt is lying in recliner. Mother present during examination. Collaborated care with nursing and patient's mom. Mom is requesting to check potassium levels as blood work was order x2 and couldn't be collected due to pt is a hard stick. Discussed with mom will coordinated with nursing, as pt moves and hard stick status. Overall pt appears comfortable and is in no acute distress. Remains with 1+ pedal edema. Pt has been on lasix and now wraps. Amlodipine was d/c due to soft bps. Pt consuming more than 76% for all 3-meals. Sleeping with no difficulties. Bed bound. Requires full assistance with ADLs/IADLs.   Incontinent of bowel and bladder. No other concerns or issues at this time.     Review of Systems   Constitutional:  Negative for chills and fever.   HENT:  Negative for congestion, nosebleeds and trouble swallowing.    Respiratory:  Negative for cough, shortness of breath and wheezing.    Cardiovascular:  Positive for leg swelling.   Gastrointestinal: Negative.    Genitourinary:  Negative for hematuria.   Musculoskeletal:  Positive for gait problem.   Skin:  Negative for color change.   Neurological:  Positive for weakness. Negative for tremors and seizures.   Psychiatric/Behavioral:  Negative for agitation and sleep disturbance.      Past Medical History:   Diagnosis Date    Arthritis     Blood pressure instability UNKNOWN    Bowel obstruction (HCC)     intestine    Bradycardia     Cerebral palsy (HCC)     Chronic edema     Closed fracture of coccyx (HCC)     DVT (deep venous thrombosis) ([facility])     US Doppler done 10/2015 and 3/2016    GERD (gastroesophageal reflux disease)     History of Doppler ultrasound     of UE and LE; 10/2015 and 3/2016 DVT    History of echocardiogram 03/2016    EF 55%. Trace mitral and tricuspid regurgitation. PA systolic pressure 33. Echo Doppler 2/2017- EF 60%.      History of EKG 10/20/2016    Normal sinus rhythm with sinus arrhythmia. Abnormal ECG     History of Holter monitoring 11/2017    14 DAY MONITOR Baseline rhythm was of sinus origin with rare APCs and VPCs and a few baseline motion artifact. NO OTHER ARRHYTHMIAS. No symptoms was reported in the attached diary.     History of Holter monitoring 10/2016    Sinus rhythm. There were a few episodes of asymptomatic sinus arrhythmia. There was sinus bradycardia at rate as low as 47 bpm. There were rare single PACs and PVCs.     Hypertension     Hyperthyroidism     Intellectual disability     Kidney stone     Lymphedema     Pneumonia     Stroke (HCC)     UNCERTAIN OF TYPE/NON VERBAL    Tachycardia     Thyroid disease     Urinary incontinence     Urinary tract infection        Past Surgical History:   Procedure Laterality Date    ABDOMINAL HYSTERECTOMY      COLONOSCOPY      EYE SURGERY      HYSTERECTOMY      HYSTERECTOMY      IR DRAINAGE TUBE PLACEMENT  6/12/2020    SMALL INTESTINE SURGERY      US GUIDED VASCULAR ACCESS  1/18/2017     Social History     Tobacco Use   Smoking Status Never   Smokeless Tobacco Never     Family History   Problem Relation Age of Onset    Cancer Father     Colon cancer Father     Asthma Mother     Stroke Mother     WESTLEY disease Mother     Arthritis Mother     Diabetes type II Mother     Mitral valve prolapse Mother     Irritable bowel syndrome Mother     Heart disease Mother       Allergies   Allergen Reactions    Bethanechol      Reaction Date: 10Dec2007; SEIZURES      Metoclopramide      Reaction Date: 10Dec2007; SEIZURES      Sulfamethoxazole-Trimethoprim      Reaction Date: 10Dec2007; SEIZURES      Fentanyl Other (See Comments)     Unknown, mother does not want pt to be given it     [facility]     Visit Vitals  /73   Pulse 66   Temp 97.6 °F (36.4 °C)   Resp 18   Wt 51.7 kg (114 lb)   SpO2 97% Comment: ra   BMI 22.26 kg/m²   OB Status  Hysterectomy   Smoking Status Never   BSA 1.47 m²       Physical Exam  Vitals and nursing note reviewed.   Constitutional:       General: She is not in acute distress.     Appearance: She is ill-appearing (chronically).   HENT:      Head: Normocephalic and atraumatic.      Nose: Nose normal.      Mouth/Throat:      Mouth: Mucous membranes are moist.   Eyes:      General:         Right eye: No discharge.         Left eye: No discharge.   Cardiovascular:      Rate and Rhythm: Normal rate and regular rhythm.      Pulses: Normal pulses.      Heart sounds: Normal heart sounds.   Pulmonary:      Effort: Pulmonary effort is normal. No respiratory distress.      Breath sounds: Normal breath sounds. No wheezing or rales.   Abdominal:      General: Bowel sounds are normal. There is no distension.      Palpations: Abdomen is soft.      Tenderness: There is no abdominal tenderness. There is no guarding.   Genitourinary:     Comments: Incontinent   Musculoskeletal:      Cervical back: Normal range of motion and neck supple. No rigidity or tenderness.      Right lower leg: Edema (1+) present.      Left lower leg: Edema (1+) present.   Skin:     General: Skin is warm.      Coloration: Skin is not jaundiced.      Findings: No bruising, erythema or rash.   Neurological:      General: No focal deficit present.      Mental Status: She is alert. Mental status is at baseline.      Motor: Weakness present.      Gait: Gait abnormal.      Comments: Alert to self   Medications reviewed     Recent labs were reviewed from [facility]  12/11/24  Cr 0.7, BUN 13, sodium 142, k 5.5, ca 9.8, GFR >60    Additional Notes:     I have spent >30 minutes with patient today in which greater than 50% of this time was spent in counseling/coordination of care regarding Diagnostic results, Prognosis, Risks and benefits of tx options, Instructions for management, Patient and family education, Importance of tx compliance, Risk factor reductions, Impressions,  Counseling / Coordination of care, Documenting in the medical record, Reviewing / ordering tests, medicine, procedures  , Obtaining or reviewing history  , and Communicating with other healthcare professionals .      This note was electronically signed by JONATHAN Piña

## 2024-01-19 VITALS
BODY MASS INDEX: 22.26 KG/M2 | RESPIRATION RATE: 18 BRPM | TEMPERATURE: 97.6 F | HEART RATE: 66 BPM | OXYGEN SATURATION: 97 % | SYSTOLIC BLOOD PRESSURE: 123 MMHG | WEIGHT: 114 LBS | DIASTOLIC BLOOD PRESSURE: 73 MMHG

## 2024-01-19 NOTE — ASSESSMENT & PLAN NOTE
Noted with R leg/foot 1+ and L leg/foot trace edema  Continue with wraps   Pt was treated with lasix x2   Will monitor now on wraps

## 2024-01-19 NOTE — ASSESSMENT & PLAN NOTE
Pt was switched from Valsartan to Amlodipine due to hyperkalemia during recent visit to ED. However in PCC log pt is noted to have soft ps.  overall SBP ranging between  from 12/28/23-1/3/24. Amlodipine d/c

## 2024-01-19 NOTE — ASSESSMENT & PLAN NOTE
12/11/23  slightly elevated at 5.5. re order labs unable to collect due to pt hard stick and moves. Pt was sent to ED however K was noted to be at 4.1.  ER d/c valsartan and started Amlodipine. Subsequently Amlodipine was d/c due to soft bps. Will coordinate with nursing to obtain blood work.  Pt appears comfortable and is in no acute distress  Collaborated care with nursing and mom

## 2024-02-13 ENCOUNTER — NURSING HOME VISIT (OUTPATIENT)
Dept: GERIATRICS | Facility: OTHER | Age: 59
End: 2024-02-13
Payer: MEDICARE

## 2024-02-13 VITALS
HEART RATE: 72 BPM | TEMPERATURE: 97.2 F | WEIGHT: 102.4 LBS | OXYGEN SATURATION: 96 % | RESPIRATION RATE: 18 BRPM | SYSTOLIC BLOOD PRESSURE: 122 MMHG | BODY MASS INDEX: 20 KG/M2 | DIASTOLIC BLOOD PRESSURE: 82 MMHG

## 2024-02-13 DIAGNOSIS — E03.9 HYPOTHYROIDISM, UNSPECIFIED TYPE: Primary | ICD-10-CM

## 2024-02-13 PROCEDURE — 99309 SBSQ NF CARE MODERATE MDM 30: CPT | Performed by: INTERNAL MEDICINE

## 2024-02-14 NOTE — PROGRESS NOTES
[facility]  [address]  [place] [address]  [facility] 32    Progress note    NAME: Elly Gonzalez  AGE: 58 y.o. SEX: female [phone]      Patient Location     [facility]     Assessment/Plan:    Hypothyroidism  Continue levothyroxine.  TSH was normal in August/23    Hypernatremia:  Noted during previous hospitalization.  Patient was treated with hypotonic fluids repeat labs could not be drawn in the facility as patient was a difficult stick.  Clinically appears to be euvolemic.  Will continue to monitor    Vitamin D deficiency:  Continue vitamin D supplements    Hypertension:  Blood pressure stable without any antihypertensive medications.  Patient was previously taken off of valsartan due to soft blood pressures    GERD:  Stable on lansoprazole    Chronic constipation:  Patient remains on Linzness senna and Colace    Dysphagia:  Continue modified diet per speech therapist recommendation    Cerebral palsy:  Continue supportive treatment    Weight loss:  Patient had weight loss of around 15 pounds during the last 2 months.  She is being followed by dietitian    Reason for visit     Follow-up cerebral palsy, hypothyroidism, hypertension, UTI, GERD    HPI       Patient is being seen for a follow-up visit today.  She is awake but unable to make her needs known.  No active issues have been reported by the staff.  There have been no reports of any fever chills dyspnea or chest congestion.  Patient remains on special diet per speech therapist recommendation.  Patient is dependent for all care.  She is incontinent of bowel and bladder     Past Medical History:   Diagnosis Date    Arthritis     Blood pressure instability UNKNOWN    Bowel obstruction (HCC)     intestine    Bradycardia     Cerebral palsy (HCC)     Chronic edema     Closed fracture of coccyx (HCC)     DVT (deep venous thrombosis) (HCC)     US Doppler done 10/2015 and 3/2016    GERD (gastroesophageal reflux  disease)     History of Doppler ultrasound     of UE and LE; 10/2015 and 3/2016 DVT    History of echocardiogram 03/2016    EF 55%. Trace mitral and tricuspid regurgitation. PA systolic pressure 33. Echo Doppler 2/2017- EF 60%.     History of EKG 10/20/2016    Normal sinus rhythm with sinus arrhythmia. Abnormal ECG     History of Holter monitoring 11/2017    14 DAY MONITOR Baseline rhythm was of sinus origin with rare APCs and VPCs and a few baseline motion artifact. NO OTHER ARRHYTHMIAS. No symptoms was reported in the attached diary.     History of Holter monitoring 10/2016    Sinus rhythm. There were a few episodes of asymptomatic sinus arrhythmia. There was sinus bradycardia at rate as low as 47 bpm. There were rare single PACs and PVCs.     Hypertension     Hyperthyroidism     Intellectual disability     Kidney stone     Lymphedema     Pneumonia     Stroke (HCC)     UNCERTAIN OF TYPE/NON VERBAL    Tachycardia     Thyroid disease     Urinary incontinence     Urinary tract infection        Past Surgical History:   Procedure Laterality Date    ABDOMINAL HYSTERECTOMY      COLONOSCOPY      EYE SURGERY      HYSTERECTOMY      HYSTERECTOMY      IR DRAINAGE TUBE PLACEMENT  6/12/2020    SMALL INTESTINE SURGERY      US GUIDED VASCULAR ACCESS  1/18/2017       Social History     Tobacco Use   Smoking Status Never   Smokeless Tobacco Never          Family History   Problem Relation Age of Onset    Cancer Father     Colon cancer Father     Asthma Mother     Stroke Mother     WESTLEY disease Mother     Arthritis Mother     Diabetes type II Mother     Mitral valve prolapse Mother     Irritable bowel syndrome Mother     Heart disease Mother         Allergies   Allergen Reactions    Bethanechol      Reaction Date: 10Dec2007; SEIZURES      Metoclopramide      Reaction Date: 10Dec2007; SEIZURES      Sulfamethoxazole-Trimethoprim      Reaction Date: 10Dec2007; SEIZURES      Fentanyl Other (See Comments)     Unknown, mother does not want  pt to be given it          Current Outpatient Medications:     albuterol (2.5 mg/3 mL) 0.083 % nebulizer solution, Take 2.5 mg by nebulization every 6 (six) hours as needed, Disp: , Rfl:     aspirin 81 mg chewable tablet, Chew 1 tablet (81 mg total) daily for 7 days, Disp: 7 tablet, Rfl: 0    bisacodyl (DULCOLAX) 10 mg suppository, Insert 10 mg into the rectum daily as needed, Disp: , Rfl:     carboxymethylcellulose 1 % ophthalmic solution, Apply 2 drops to eye in the morning, Disp: , Rfl:     cholecalciferol (VITAMIN D3) 1,000 units tablet, Take 1,000 Units by mouth daily, Disp: , Rfl:     Diclofenac Sodium (VOLTAREN) 1 %, Apply 2 g topically 3 (three) times a day, Disp: , Rfl:     docusate sodium (COLACE) 100 mg capsule, Take 100 mg by mouth in the morning, Disp: , Rfl:     DULoxetine (CYMBALTA) 30 mg delayed release capsule, Take 30 mg by mouth daily FOR FIBROMYALGIA, Disp: , Rfl:     Garlic 1000 MG CAPS, Take 1 capsule by mouth daily, Disp: , Rfl:     lansoprazole (PREVACID) 30 mg capsule, TAKE 1 CAPSULE BY MOUTH EVERY DAY, Disp: 90 capsule, Rfl: 3    levothyroxine 75 mcg tablet, Take 75 mcg by mouth daily, Disp: , Rfl:     Linzess 145 MCG CAPS, TAKE 1 CAPSULE BY MOUTH EVERY DAY, Disp: 90 capsule, Rfl: 1    magnesium hydroxide (MILK OF MAGNESIA) 400 mg/5 mL oral suspension, Take 30 mL by mouth daily as needed, Disp: , Rfl:     senna (SENOKOT) 8.6 MG tablet, Take 1 tablet by mouth 2 (two) times a day, Disp: , Rfl:     sodium phosphate-biphosphate (FLEET) 7-19 g 118 mL enema, Insert 1 enema into the rectum once as needed, Disp: , Rfl:     Updated list was reviewed in [facility] system of facility.     Vitals:    02/13/24 2057   BP: 122/82   Pulse: 72   Resp: 18   Temp: (!) 97.2 °F (36.2 °C)   SpO2: 96%       Vital signs were reviewed in [facility] care    Review of Systems   Reason unable to perform ROS: Cerebral palsy.   Constitutional:  Positive for unexpected weight change (Weight loss of around 15 pounds  "during the last 2 months). Negative for chills and fever.   HENT:  Positive for trouble swallowing.    Respiratory:  Negative for shortness of breath and wheezing.    Cardiovascular:  Negative for leg swelling.   Gastrointestinal:  Negative for abdominal distention and vomiting.   Genitourinary:  Negative for hematuria.   Musculoskeletal:  Positive for gait problem.   Neurological:  Positive for weakness. Negative for seizures and syncope.   Psychiatric/Behavioral:  Positive for behavioral problems and confusion.        Physical Exam  Constitutional:       General: She is not in acute distress.  HENT:      Head: Normocephalic and atraumatic.   Cardiovascular:      Rate and Rhythm: Normal rate and regular rhythm.   Pulmonary:      Breath sounds: No wheezing, rhonchi or rales.   Abdominal:      General: There is no distension.      Palpations: Abdomen is soft.      Tenderness: There is no abdominal tenderness. There is no guarding.   Musculoskeletal:         General: Deformity (Contractures of all extremities noted) present.      Cervical back: Neck supple.      Right lower leg: Edema (Chronic edema involving dorsal aspect of feet bilaterally) present.      Left lower leg: Edema present.      Comments: Chronic edema involving dorsum of feet bilaterally   Skin:     Coloration: Skin is not jaundiced.   Neurological:      Mental Status: She is disoriented.      Motor: No weakness (Generalized).      Comments: Bilateral wrist drop.  Increased rigidity involving bilateral upper extremities  Patient is unable to carry out any effective communication   Psychiatric:         Mood and Affect: Mood normal.           Diagnostic Data       Previous labs revealed    Hemoglobin 8.9, WBC count 8, platelet count 140  Creatinine 0.59, BUN 29, potassium 4.2         Code Status:      Full code           Portions of the record may have been created with voice recognition software.  Occasional wrong word or \"sound a like\" substitutions may " have occurred due to the inherent limitations of voice recognition software.  Read the chart carefully and recognize, using context, where substitutions have occurred.    This note was electronically signed by Dr. Marilia German

## 2024-02-21 PROBLEM — N39.0 RECURRENT UTI: Status: RESOLVED | Noted: 2018-08-03 | Resolved: 2024-02-21

## 2024-03-07 ENCOUNTER — NURSING HOME VISIT (OUTPATIENT)
Dept: GERIATRICS | Facility: OTHER | Age: 59
End: 2024-03-07
Payer: MEDICARE

## 2024-03-07 DIAGNOSIS — I10 ESSENTIAL HYPERTENSION: ICD-10-CM

## 2024-03-07 DIAGNOSIS — G80.9 CEREBRAL PALSY, UNSPECIFIED TYPE (HCC): ICD-10-CM

## 2024-03-07 DIAGNOSIS — R05.1 ACUTE COUGH: Primary | ICD-10-CM

## 2024-03-07 DIAGNOSIS — R26.2 AMBULATORY DYSFUNCTION: ICD-10-CM

## 2024-03-07 DIAGNOSIS — R53.81 PHYSICAL DECONDITIONING: ICD-10-CM

## 2024-03-07 PROCEDURE — 99309 SBSQ NF CARE MODERATE MDM 30: CPT | Performed by: NURSE PRACTITIONER

## 2024-03-07 NOTE — PROGRESS NOTES
[facility]  [address]   [address]  [place], [place], [address] [phone]    Progress Note  Code [facility] 32    Patient Location     [facility]    Reason for visit     LT Acute Visit     Patient’s care was coordinated with nursing facility staff. Recent vitals, labs and updated medications were reviewed on [facility] system of facility.     Problem List Items Addressed This Visit       Physical deconditioning     Multifactorial in the setting of acute and chronic medical conditions   Patient with known history of cerebral palsy  Noted UE/LE contractures and rigidity  Bed bound   Continue restorative services   Continue to implement fall and safety precaution   Continue with pressure offloading and assist  Encourage adequate oral hydration and nutrition, continue with daily multivitamins and supplements   Continue with 24-7 supportive             Cerebral palsy (HCC)     Skyler lift   Recliner bound  Mute   On puree texture and nectar consistency   +UE LE contractures   Dependent for all ADLs/IADLs. B/B incontinent  Continue 24/7 supportive care          Essential hypertension     BP acceptable   Pt is not on any antihypertensive at this time   Monitor Bps          Ambulatory dysfunction     Bed bound. Skyler lift to recliner   Continue fall and safety precaution   Dependent for ADLs/IADLs  Continue with supportive care   PT/OT/SL/SW/DT/ following         Acute cough - Primary     Pt noted to have productive cough. Lungs diminished and clear  Respiratory status stable on ra. No bronchospasm on exam  Per staff pt's mom has been feeding pt ice cream and other food that are not recommended for patient inability to swallow thin foods. Suspect aspiration episodes. Monitor closely for aspiration.  Continue modified diet.  Patient's mother has been educated previously regarding risk of aspiration with regular diet.  Pt is afebrile. HD stable. Non toxic appearance   Started on  guaifenesin and benzonatate. Continue albuterol breathing tx,   Monitor respiratory status             HPI     Patient is a 58-yr old female seen and examined for acute and chronic medical conditions. At the time of my evaluation patient appears comfortable and is in no acute distress. Pt is mute. Pt is lying in recliner. Noted to have productive cough. Per staff pt is puree texture diet and nectar consistency and mom feeds pt ice cream and non thickened fluids. Pt is afebrile. HD stable. Incontinent. Overall pt appears comfortable and is in no acute distress. Remains with 1+ pedal edema. Pt consuming more than 76% for all 3-meals. Sleeping with no difficulties. Bed bound. Requires full assistance with ADLs/IADLs.   Incontinent of bowel and bladder. No other concerns or issues at this time.     Review of Systems   Constitutional:  Negative for chills and fever.   HENT:  Negative for congestion, nosebleeds and trouble swallowing.    Respiratory:  Negative for cough, shortness of breath and wheezing.    Cardiovascular:  Positive for leg swelling.   Gastrointestinal: Negative.    Genitourinary:  Negative for hematuria.   Musculoskeletal:  Positive for gait problem.   Skin:  Negative for color change.   Neurological:  Positive for weakness. Negative for tremors and seizures.   Psychiatric/Behavioral:  Negative for agitation and sleep disturbance.      Past Medical History:   Diagnosis Date    Arthritis     Blood pressure instability UNKNOWN    Bowel obstruction (HCC)     intestine    Bradycardia     Cerebral palsy (HCC)     Chronic edema     Closed fracture of coccyx (HCC)     DVT (deep venous thrombosis) ([facility])     US Doppler done 10/2015 and 3/2016    GERD (gastroesophageal reflux disease)     History of Doppler ultrasound     of UE and LE; 10/2015 and 3/2016 DVT    History of echocardiogram 03/2016    EF 55%. Trace mitral and tricuspid regurgitation. PA systolic pressure 33. Echo Doppler 2/2017- EF 60%.     History of  EKG 10/20/2016    Normal sinus rhythm with sinus arrhythmia. Abnormal ECG     History of Holter monitoring 11/2017    14 DAY MONITOR Baseline rhythm was of sinus origin with rare APCs and VPCs and a few baseline motion artifact. NO OTHER ARRHYTHMIAS. No symptoms was reported in the attached diary.     History of Holter monitoring 10/2016    Sinus rhythm. There were a few episodes of asymptomatic sinus arrhythmia. There was sinus bradycardia at rate as low as 47 bpm. There were rare single PACs and PVCs.     Hypertension     Hyperthyroidism     Intellectual disability     Kidney stone     Lymphedema     Pneumonia     Stroke (HCC)     UNCERTAIN OF TYPE/NON VERBAL    Tachycardia     Thyroid disease     Urinary incontinence     Urinary tract infection        Past Surgical History:   Procedure Laterality Date    ABDOMINAL HYSTERECTOMY      COLONOSCOPY      EYE SURGERY      HYSTERECTOMY      HYSTERECTOMY      IR DRAINAGE TUBE PLACEMENT  6/12/2020    SMALL INTESTINE SURGERY      US GUIDED VASCULAR ACCESS  1/18/2017     Social History     Tobacco Use   Smoking Status Never   Smokeless Tobacco Never     Family History   Problem Relation Age of Onset    Cancer Father     Colon cancer Father     Asthma Mother     Stroke Mother     WESTLEY disease Mother     Arthritis Mother     Diabetes type II Mother     Mitral valve prolapse Mother     Irritable bowel syndrome Mother     Heart disease Mother       Allergies   Allergen Reactions    Bethanechol      Reaction Date: 10Dec2007; SEIZURES      Metoclopramide      Reaction Date: 10Dec2007; SEIZURES      Sulfamethoxazole-Trimethoprim      Reaction Date: 10Dec2007; SEIZURES      Fentanyl Other (See Comments)     Unknown, mother does not want pt to be given it     Updated list was reviewed in [facility] system of facility.     Visit Vitals  /63   Pulse 70   Temp 97.7 °F (36.5 °C)   Resp 18   Wt 47.4 kg (104 lb 6.4 oz)   SpO2 98%   BMI 20.39 kg/m²   OB Status Hysterectomy    Smoking Status Never   BSA 1.42 m²        Physical Exam  Vitals and nursing note reviewed.   Constitutional:       General: She is not in acute distress.     Appearance: She is ill-appearing (chronically).   HENT:      Head: Normocephalic and atraumatic.      Nose: Nose normal.      Mouth/Throat:      Mouth: Mucous membranes are moist.   Eyes:      General:         Right eye: No discharge.         Left eye: No discharge.   Cardiovascular:      Rate and Rhythm: Normal rate and regular rhythm.      Pulses: Normal pulses.      Heart sounds: Normal heart sounds.   Pulmonary:      Effort: Pulmonary effort is normal. No respiratory distress.      Breath sounds: Normal breath sounds. No wheezing or rales.   Abdominal:      General: Bowel sounds are normal. There is no distension.      Palpations: Abdomen is soft.      Tenderness: There is no abdominal tenderness. There is no guarding.   Genitourinary:     Comments: Incontinent   Musculoskeletal:      Cervical back: Normal range of motion and neck supple. No rigidity or tenderness.      Right lower leg: Edema (1+) present.      Left lower leg: Edema (1+) present.   Skin:     General: Skin is warm.      Coloration: Skin is not jaundiced.      Findings: No bruising, erythema or rash.   Neurological:      General: No focal deficit present.      Mental Status: She is alert. Mental status is at baseline.      Motor: Weakness present.      Gait: Gait abnormal.      Comments: Alert to self     Medications reviewed     Recent labs were reviewed from [facility]  12/11/24  Cr 0.7, BUN 13, sodium 142, k 5.5, ca 9.8, GFR >60    Additional Notes:     This note was electronically signed by JONATHAN Piña

## 2024-03-09 ENCOUNTER — TELEPHONE (OUTPATIENT)
Dept: OTHER | Facility: OTHER | Age: 59
End: 2024-03-09

## 2024-03-10 NOTE — TELEPHONE ENCOUNTER
Destiny from [facility] requested to speak to the on call provider regarding the pt's UA results.    Contacted the on call via TC.

## 2024-03-11 ENCOUNTER — NURSING HOME VISIT (OUTPATIENT)
Dept: GERIATRICS | Facility: OTHER | Age: 59
End: 2024-03-11
Payer: MEDICARE

## 2024-03-11 VITALS
BODY MASS INDEX: 20.39 KG/M2 | SYSTOLIC BLOOD PRESSURE: 117 MMHG | DIASTOLIC BLOOD PRESSURE: 67 MMHG | HEART RATE: 76 BPM | OXYGEN SATURATION: 97 % | TEMPERATURE: 97.7 F | WEIGHT: 104.4 LBS | RESPIRATION RATE: 18 BRPM

## 2024-03-11 DIAGNOSIS — R05.2 SUBACUTE COUGH: Primary | ICD-10-CM

## 2024-03-11 PROCEDURE — 99309 SBSQ NF CARE MODERATE MDM 30: CPT | Performed by: INTERNAL MEDICINE

## 2024-03-12 NOTE — ASSESSMENT & PLAN NOTE
Patient was noted to have increased coughing spells earlier this morning with mucus production which later subsided.  Remains afebrile.  No bronchospasm appreciated on exam.  Per staff patient was fed spaghetti dinner by family  last night.  Suspect aspiration episodes..  Continue guaifenesin as needed.  Monitor closely for aspiration.  Continue modified diet.  Patient's mother has been educated previously regarding risk of aspiration with regular diet

## 2024-03-12 NOTE — PROGRESS NOTES
[facility]  [address]  [place] [address]  [facility]    Progress note    NAME: Elly Gonzalez  AGE: 58 y.o. SEX: female [phone]      Patient Location     [facility]     Assessment/Plan:    Cough  Patient was noted to have increased coughing spells earlier this morning with mucus production which later subsided.  Remains afebrile.  No bronchospasm appreciated on exam.  Per staff patient was fed spaghetti dinner by family  last night.  Suspect aspiration episodes..  Continue guaifenesin as needed.  Monitor closely for aspiration.  Continue modified diet.  Patient's mother has been educated previously regarding risk of aspiration with regular diet        Abnormal UA:  Recent UA revealed bacteria and nitrite.  Patient was started on Keflex for on-call provider for suspected UTI.  Remains afebrile and clinically nontoxic.  Continue to monitor    Hypernatremia:  Noted during previous hospitalization.  Patient was treated with hypotonic fluids repeat labs could not be drawn in the facility as patient was a difficult stick.  Clinically appears to be euvolemic.  Will continue to monitor    Vitamin D deficiency:  Continue vitamin D supplements    Hypertension:  Blood pressure stable without any antihypertensive medications.      GERD:  Stable on lansoprazole    Chronic constipation:  Continue Lubiprostone capsule, senna and Colace    Dysphagia:  Continue modified diet per speech therapist recommendation.  Patient remains at high risk for aspiration    Cerebral palsy:  Continue supportive treatment    Weight loss:  Patient had weight loss of around 15 pounds during the last 2 months.  Encourage p.o. intake.  Follow-up with dietitian  Reason for visit     Cough, UTI    HPI       Patient is being seen for evaluation of increased coughing spells noted earlier this morning.  Per staff patient was fed spaghetti dinner by family member last night.  This morning she was noted to  have coughing episodes with increased mucus.  Cough later subsided.  No  fevers, dyspnea or bronchospasm was reported.  At the time of my evaluation patient appears to be resting comfortably.  No signs of acute distress. Sao2 is stable at 97% on room.  Patient was recently noted to have pyuria on UA.  She was started on Keflex for suspected UTI   Past Medical History:   Diagnosis Date    Arthritis     Blood pressure instability UNKNOWN    Bowel obstruction (HCC)     intestine    Bradycardia     Cerebral palsy ([facility])     Chronic edema     Closed fracture of coccyx ([facility])     DVT (deep venous thrombosis) ([facility])     US Doppler done 10/2015 and 3/2016    GERD (gastroesophageal reflux disease)     History of Doppler ultrasound     of UE and LE; 10/2015 and 3/2016 DVT    History of echocardiogram 03/2016    EF 55%. Trace mitral and tricuspid regurgitation. PA systolic pressure 33. Echo Doppler 2/2017- EF 60%.     History of EKG 10/20/2016    Normal sinus rhythm with sinus arrhythmia. Abnormal ECG     History of Holter monitoring 11/2017    14 DAY MONITOR Baseline rhythm was of sinus origin with rare APCs and VPCs and a few baseline motion artifact. NO OTHER ARRHYTHMIAS. No symptoms was reported in the attached diary.     History of Holter monitoring 10/2016    Sinus rhythm. There were a few episodes of asymptomatic sinus arrhythmia. There was sinus bradycardia at rate as low as 47 bpm. There were rare single PACs and PVCs.     Hypertension     Hyperthyroidism     Intellectual disability     Kidney stone     Lymphedema     Pneumonia     Stroke ([facility])     UNCERTAIN OF TYPE/NON VERBAL    Tachycardia     Thyroid disease     Urinary incontinence     Urinary tract infection        Past Surgical History:   Procedure Laterality Date    ABDOMINAL HYSTERECTOMY      COLONOSCOPY      EYE SURGERY      HYSTERECTOMY      HYSTERECTOMY      IR DRAINAGE TUBE PLACEMENT  6/12/2020    SMALL INTESTINE SURGERY      US GUIDED VASCULAR ACCESS   1/18/2017       Social History     Tobacco Use   Smoking Status Never   Smokeless Tobacco Never          Family History   Problem Relation Age of Onset    Cancer Father     Colon cancer Father     Asthma Mother     Stroke Mother     WESTLEY disease Mother     Arthritis Mother     Diabetes type II Mother     Mitral valve prolapse Mother     Irritable bowel syndrome Mother     Heart disease Mother         Allergies   Allergen Reactions    Bethanechol      Reaction Date: 10Dec2007; SEIZURES      Metoclopramide      Reaction Date: 10Dec2007; SEIZURES      Sulfamethoxazole-Trimethoprim      Reaction Date: 10Dec2007; SEIZURES      Fentanyl Other (See Comments)     Unknown, mother does not want pt to be given it          Current Outpatient Medications:     albuterol (2.5 mg/3 mL) 0.083 % nebulizer solution, Take 2.5 mg by nebulization every 6 (six) hours as needed, Disp: , Rfl:     aspirin 81 mg chewable tablet, Chew 1 tablet (81 mg total) daily for 7 days, Disp: 7 tablet, Rfl: 0    bisacodyl (DULCOLAX) 10 mg suppository, Insert 10 mg into the rectum daily as needed, Disp: , Rfl:     carboxymethylcellulose 1 % ophthalmic solution, Apply 2 drops to eye in the morning, Disp: , Rfl:     cholecalciferol (VITAMIN D3) 1,000 units tablet, Take 1,000 Units by mouth daily, Disp: , Rfl:     Diclofenac Sodium (VOLTAREN) 1 %, Apply 2 g topically 3 (three) times a day, Disp: , Rfl:     docusate sodium (COLACE) 100 mg capsule, Take 100 mg by mouth in the morning, Disp: , Rfl:     DULoxetine (CYMBALTA) 30 mg delayed release capsule, Take 30 mg by mouth daily FOR FIBROMYALGIA, Disp: , Rfl:     Garlic 1000 MG CAPS, Take 1 capsule by mouth daily, Disp: , Rfl:     lansoprazole (PREVACID) 30 mg capsule, TAKE 1 CAPSULE BY MOUTH EVERY DAY, Disp: 90 capsule, Rfl: 3    levothyroxine 75 mcg tablet, Take 75 mcg by mouth daily, Disp: , Rfl:     Linzess 145 MCG CAPS, TAKE 1 CAPSULE BY MOUTH EVERY DAY, Disp: 90 capsule, Rfl: 1    magnesium hydroxide (MILK  OF MAGNESIA) 400 mg/5 mL oral suspension, Take 30 mL by mouth daily as needed, Disp: , Rfl:     senna (SENOKOT) 8.6 MG tablet, Take 1 tablet by mouth 2 (two) times a day, Disp: , Rfl:     sodium phosphate-biphosphate (FLEET) 7-19 g 118 mL enema, Insert 1 enema into the rectum once as needed, Disp: , Rfl:     Updated list was reviewed in [facility] system of facility.     Vitals:    03/10/24 2142   BP: 117/67   Pulse: 76   Resp: 18   Temp: 97.7 °F (36.5 °C)   SpO2: 97%         Vital signs were reviewed in [facility] care    Review of Systems   Unable to perform ROS: Patient nonverbal (Cerebral palsy)   Constitutional:  Negative for chills and fever.   HENT:  Positive for trouble swallowing.    Respiratory:  Positive for cough (Reported earlier today). Negative for shortness of breath and wheezing.    Cardiovascular:  Negative for leg swelling.   Gastrointestinal:  Negative for abdominal distention and vomiting.   Genitourinary:  Negative for hematuria.   Musculoskeletal:  Positive for gait problem.   Neurological:  Positive for weakness. Negative for seizures and syncope.   Psychiatric/Behavioral:  Positive for behavioral problems and confusion.        Physical Exam  Constitutional:       General: She is not in acute distress.  HENT:      Head: Normocephalic and atraumatic.   Cardiovascular:      Rate and Rhythm: Normal rate and regular rhythm.   Pulmonary:      Breath sounds: No wheezing, rhonchi or rales.   Abdominal:      General: There is no distension.      Palpations: Abdomen is soft.      Tenderness: There is no abdominal tenderness. There is no guarding.   Musculoskeletal:         General: Deformity (Contractures of all extremities) present.      Cervical back: Neck supple.      Right lower leg: No edema.      Left lower leg: Edema present.      Comments: Chronic edema involving dorsum of feet bilaterally   Skin:     Coloration: Skin is not jaundiced.   Neurological:      Mental Status: She is  "disoriented.      Motor: No weakness (Generalized).           Diagnostic Data     CBC from 3/8/2024 revealed WBC count of 4.5, hemoglobin 11.7, platelet count 263 BMP from  1211 showed creatinine of 0.7, BUN of 13, sodium 142, potassium 5.5.    UA from  2/17 reviewed.       Code Status:      Full code           Portions of the record may have been created with voice recognition software.  Occasional wrong word or \"sound a like\" substitutions may have occurred due to the inherent limitations of voice recognition software.  Read the chart carefully and recognize, using context, where substitutions have occurred.    This note was electronically signed by Dr. Marilia German   "

## 2024-03-14 VITALS
WEIGHT: 104.4 LBS | HEART RATE: 70 BPM | DIASTOLIC BLOOD PRESSURE: 63 MMHG | OXYGEN SATURATION: 98 % | BODY MASS INDEX: 20.39 KG/M2 | TEMPERATURE: 97.7 F | SYSTOLIC BLOOD PRESSURE: 118 MMHG | RESPIRATION RATE: 18 BRPM

## 2024-03-14 PROBLEM — R05.1 ACUTE COUGH: Status: ACTIVE | Noted: 2024-03-14

## 2024-03-14 NOTE — ASSESSMENT & PLAN NOTE
Pt noted to have productive cough. Lungs diminished and clear  Respiratory status stable on ra. No bronchospasm on exam  Per staff pt's mom has been feeding pt ice cream and other food that are not recommended for patient inability to swallow thin foods. Suspect aspiration episodes. Monitor closely for aspiration.  Continue modified diet.  Patient's mother has been educated previously regarding risk of aspiration with regular diet.  Pt is afebrile. HD stable. Non toxic appearance   Started on guaifenesin and benzonatate. Continue albuterol breathing tx,   Monitor respiratory status

## 2024-04-17 ENCOUNTER — NURSING HOME VISIT (OUTPATIENT)
Dept: GERIATRICS | Facility: OTHER | Age: 59
End: 2024-04-17
Payer: MEDICARE

## 2024-04-17 DIAGNOSIS — N30.00 ACUTE CYSTITIS WITHOUT HEMATURIA: Primary | ICD-10-CM

## 2024-04-17 DIAGNOSIS — R13.10 DYSPHAGIA, UNSPECIFIED TYPE: ICD-10-CM

## 2024-04-17 DIAGNOSIS — G80.9 CEREBRAL PALSY, UNSPECIFIED TYPE (HCC): ICD-10-CM

## 2024-04-17 DIAGNOSIS — K59.09 CHRONIC CONSTIPATION: ICD-10-CM

## 2024-04-17 PROCEDURE — 99310 SBSQ NF CARE HIGH MDM 45: CPT | Performed by: NURSE PRACTITIONER

## 2024-04-17 NOTE — PROGRESS NOTES
"[address], [facility] [address]  [phone]  [facility]  [address]  PROGRESS NOTE        NAME: Elly Gonzalez  AGE: 58 y.o. SEX: female  :  1965  DATE OF ENCOUNTER: 2024    Chief Complaint   Patient seen and examined for follow up s/p ED visit.    History of Present Illness     [facility] and examined today for follow-up after ED visit yesterday. Upon examination, the patient is sitting in her chair, alert, cooperative, and in no acute distress.      The patient is unable to provide reliable history r/t severe cognitive delay.    Per ED notes, brought in by EMS from [facility] and mother of patient states \"she had the cold sweats yesterday and today she has been shaking and having the sweats again. I just think she is in pain for some reason because she is all over the place and she would just be laying.\"     Presents to the ED with mother for evaluation of abnormal behavior and chills. The patient's mother states that she visits her frequently at her care facility. Yesterday she did not seem to be acting her normal self and today when she went to visit she notes that the patient was shaking. She states that the shaking is typically indicative of the patient being in pain or having an illness. She had similar behaviors 6 months ago when she was admitted to the hospital for UTI and sepsis. Patient also seem to have a cold sweat yesterday but no reported fever at her care facility. Today the mother was urging that there was something wrong and requested the patient to be sent to the hospital however initially the physician and care team did not feel this was necessary. The patient's evening nurse did come on and states that her behavior today did seem atypical for her and agreed with plan to send to the hospital so she was ultimately brought in. They checked vital signs at that time which were normal except for O2 " sat which had decreasing to 88%. Patient does not normally requiring any oxygen. Family has not noticed any other signs or symptoms of illness including no current cough, abdominal pain, normal intake and output.  Patient CT of A/P with no acute findings.  Urinalysis with the following findings.    Labs Reviewed   URINALYSIS - Abnormal   Result Value   Specific Gravity, Urine 1.019   pH, Urine 7.0   Protein, Urine 10-29 (*)   Glucose, Urine Negative   Ketone, Urine Negative   Blood, Urine 0.06-0.19 (*)   Leukocytes Esterase 250-499 (*)   Nitrite, Urine Negative   Comment SEE NOTES   WBC/HPF 21-50   RBC/HPF 3-5   Bacteria 4+ (*)   Squamous Epithelial Cells 6-10   Mucous Threads Few     Patient was given Cephalexin 1000 mg po in ED and will continue on 500 mg po QID x 7 days.   She was returned back to the facility without incident.    The following portions of the patient's history were reviewed and updated as appropriate: allergies, current medications, past family history, past medical history, past social history, past surgical history and problem list.    Review of Systems     Review of Systems   Unable to perform ROS: Patient nonverbal   Constitutional:  Positive for fatigue. Negative for chills and fever.   HENT:  Positive for trouble swallowing. Negative for ear pain and sore throat.    Eyes:  Negative for pain and visual disturbance.   Respiratory:  Negative for cough and shortness of breath.    Cardiovascular:  Negative for chest pain and palpitations.   Gastrointestinal:  Negative for abdominal pain and vomiting.   Genitourinary:  Negative for dysuria and hematuria.   Musculoskeletal:  Positive for gait problem. Negative for arthralgias and back pain.   Skin:  Negative for color change and rash.   Neurological:  Positive for speech difficulty and weakness. Negative for seizures and syncope.   Psychiatric/Behavioral:  Positive for behavioral problems and confusion.    All other systems reviewed and are  negative.       History     Past Medical History:   Diagnosis Date    Arthritis     Blood pressure instability UNKNOWN    Bowel obstruction (HCC)     intestine    Bradycardia     Cerebral palsy (HCC)     Chronic edema     Closed fracture of coccyx (HCC)     DVT (deep venous thrombosis) ([facility])     US Doppler done 10/2015 and 3/2016    GERD (gastroesophageal reflux disease)     History of Doppler ultrasound     of UE and LE; 10/2015 and 3/2016 DVT    History of echocardiogram 03/2016    EF 55%. Trace mitral and tricuspid regurgitation. PA systolic pressure 33. Echo Doppler 2/2017- EF 60%.     History of EKG 10/20/2016    Normal sinus rhythm with sinus arrhythmia. Abnormal ECG     History of Holter monitoring 11/2017    14 DAY MONITOR Baseline rhythm was of sinus origin with rare APCs and VPCs and a few baseline motion artifact. NO OTHER ARRHYTHMIAS. No symptoms was reported in the attached diary.     History of Holter monitoring 10/2016    Sinus rhythm. There were a few episodes of asymptomatic sinus arrhythmia. There was sinus bradycardia at rate as low as 47 bpm. There were rare single PACs and PVCs.     Hypertension     Hyperthyroidism     Intellectual disability     Kidney stone     Lymphedema     Pneumonia     Stroke (HCC)     UNCERTAIN OF TYPE/NON VERBAL    Tachycardia     Thyroid disease     Urinary incontinence     Urinary tract infection      Past Surgical History:   Procedure Laterality Date    ABDOMINAL HYSTERECTOMY      COLONOSCOPY      EYE SURGERY      HYSTERECTOMY      HYSTERECTOMY      IR DRAINAGE TUBE PLACEMENT  6/12/2020    SMALL INTESTINE SURGERY      US GUIDED VASCULAR ACCESS  1/18/2017     Family History   Problem Relation Age of Onset    Cancer Father     Colon cancer Father     Asthma Mother     Stroke Mother     WESTLEY disease Mother     Arthritis Mother     Diabetes type II Mother     Mitral valve prolapse Mother     Irritable bowel syndrome Mother     Heart disease Mother      Social History      Socioeconomic History    Marital status: Single     Spouse name: Not on file    Number of children: 0    Years of education: Not on file    Highest education level: Not on file   Occupational History    Occupation: Disabled   Tobacco Use    Smoking status: Never    Smokeless tobacco: Never   Vaping Use    Vaping status: Never Used   Substance and Sexual Activity    Alcohol use: No    Drug use: No    Sexual activity: Never   Other Topics Concern    Not on file   Social History Narrative    · Most recent tobacco use screenin2020      · Do you currently or have you served in the  [employer]:   No      · Were you activated, into active duty, as a member of the [employer] or as a Reservist:   No       · Live alone or with others:   with others      · Number of children:   0      · Siblings:   yes        · Caffeine intake:   Occasional      · Diet:   Specific      · Exercise level:   Moderate      · Family history of heart disease:   No      · Overweight:   No      · Obese:   No      · Advance directive:   No      · Blood Transfusion:   No       · High cholesterol:   No      · High blood pressure:   No      · Diabetes:   No      · General stress level:   Low      · Guns present in home:   No      · Seat belts used routinely:   Yes      · Seat belt/car seat used routinely:   Yes      · Do you have regular medical check ups:   Yes      Social Determinants of Health     Financial Resource Strain: Low Risk  (2023)    Received from [facility]    Overall Financial Resource Strain (CARDIA)     Difficulty of Paying Living Expenses: Not hard at all   Food Insecurity: No Food Insecurity (2023)    Received from [facility]    Hunger Vital Sign     Worried About Running Out of Food in the Last Year: Never true     Ran Out of Food in the Last Year: Never true   Transportation Needs: No Transportation Needs (2023)    Received from [facility]     PRAPARE - Transportation     Lack of Transportation (Medical): No     Lack of Transportation (Non-Medical): No   Physical Activity: Not on file   Stress: Not on file   Social Connections: Not on file   Intimate Partner Violence: Patient Declined (11/24/2023)    Received from [facility]    Humiliation, Afraid, Rape, and Kick questionnaire     Fear of Current or Ex-Partner: Patient declined     Emotionally Abused: Patient declined     Physically Abused: Patient declined     Sexually Abused: Patient declined   Housing Stability: Low Risk  (11/24/2023)    Received from [facility]    Housing Stability Vital Sign     Unable to Pay for Housing in the Last Year: No     Number of Places Lived in the Last Year: 1     Unstable Housing in the Last Year: No     Allergies   Allergen Reactions    Bethanechol      Reaction Date: 10Dec2007; SEIZURES      Metoclopramide      Reaction Date: 10Dec2007; SEIZURES      Sulfamethoxazole-Trimethoprim      Reaction Date: 10Dec2007; SEIZURES      Fentanyl Other (See Comments)     Unknown, mother does not want pt to be given it       Objective     Vital Signs  BP: 118/78       HR:88 T:98.1    RR:18 O2Sat:99% W:101.6  Physical Exam  Vitals reviewed.   HENT:      Head: Normocephalic and atraumatic.   Cardiovascular:      Rate and Rhythm: Normal rate and regular rhythm.      Heart sounds: Normal heart sounds.   Pulmonary:      Breath sounds: Normal breath sounds. No wheezing, rhonchi or rales.   Abdominal:      General: Bowel sounds are normal. There is no distension.      Palpations: Abdomen is soft.      Tenderness: There is no abdominal tenderness.   Musculoskeletal:      Right lower leg: Edema present.      Left lower leg: Edema present.      Comments: Contractures of all extremities   Neurological:      Mental Status: She is alert. Mental status is at baseline. She is disoriented.      Motor: Weakness present.      Gait: Gait abnormal.   Psychiatric:     "     Mood and Affect: Mood normal.        Pertinent Laboratory/Diagnostic Studies have been independently reviewed.      Current Medications     Current Medications Reviewed and updated in Nursing Home EMR.    Assessment and Plan     Acute cystitis without hematuria  Per [facility] ED Urinalysis  Continue Keflex 500 mg QID x 7 days thru 4/24/24  Encourage po Nectar thick liquid intake  Continue to monitor for acute changes in condition    Cerebral palsy (HCC)  Continue supportive treatment  Patient remains dependent on all ADLs by SNF staff  Continue to monitor for acute changes in condition    Dysphagia  History of dysphagia  Maintain aspiration precautions  Continue recommended diet by ST, pureed texture with nectar thick liquids  Continue to monitor for acute changes in condition    Chronic constipation  Continue Linzess, Senna, and Colace  Continue to encourage po fluid intake of nectar thick liquids  Continue to monitor for acute changes in condition      Portions of this note may have been written with use of the previous note summary. Assessment and Plan of care have been reviewed and updated as appropriate.  In addition, voice-recognition software may have been used in the preparation of this document. Occasional wrong word or \"sound-alike\" substitutions may have occurred due to the inherent limitations of voice recognition software. Interpretation should be guided by context.      Nelia CASTILLO  Geriatric Medicine  4/17/2024           "

## 2024-04-28 PROBLEM — N30.00 ACUTE CYSTITIS WITHOUT HEMATURIA: Status: ACTIVE | Noted: 2024-04-17

## 2024-04-28 NOTE — ASSESSMENT & PLAN NOTE
Continue supportive treatment  Patient remains dependent on all ADLs by SNF staff  Continue to monitor for acute changes in condition

## 2024-04-28 NOTE — ASSESSMENT & PLAN NOTE
Per [facility] ED Urinalysis  Continue Keflex 500 mg QID x 7 days thru 4/24/24  Encourage po Nectar thick liquid intake  Continue to monitor for acute changes in condition

## 2024-04-28 NOTE — ASSESSMENT & PLAN NOTE
History of dysphagia  Maintain aspiration precautions  Continue recommended diet by ST, pureed texture with nectar thick liquids  Continue to monitor for acute changes in condition

## 2024-04-28 NOTE — ASSESSMENT & PLAN NOTE
Continue Linzess, Senna, and Colace  Continue to encourage po fluid intake of nectar thick liquids  Continue to monitor for acute changes in condition

## 2024-05-01 PROBLEM — R05.2 SUBACUTE COUGH: Status: RESOLVED | Noted: 2024-03-11 | Resolved: 2024-05-01

## 2024-05-01 PROBLEM — R05.1 ACUTE COUGH: Status: RESOLVED | Noted: 2024-03-14 | Resolved: 2024-05-01

## 2024-05-09 ENCOUNTER — NURSING HOME VISIT (OUTPATIENT)
Dept: GERIATRICS | Facility: OTHER | Age: 59
End: 2024-05-09
Payer: MEDICARE

## 2024-05-09 DIAGNOSIS — R26.2 AMBULATORY DYSFUNCTION: ICD-10-CM

## 2024-05-09 DIAGNOSIS — I10 ESSENTIAL HYPERTENSION: ICD-10-CM

## 2024-05-09 DIAGNOSIS — G80.9 CEREBRAL PALSY, UNSPECIFIED TYPE (HCC): ICD-10-CM

## 2024-05-09 DIAGNOSIS — R53.81 PHYSICAL DECONDITIONING: ICD-10-CM

## 2024-05-09 DIAGNOSIS — K21.9 GASTROESOPHAGEAL REFLUX DISEASE, UNSPECIFIED WHETHER ESOPHAGITIS PRESENT: ICD-10-CM

## 2024-05-09 DIAGNOSIS — R13.10 DYSPHAGIA, UNSPECIFIED TYPE: Primary | ICD-10-CM

## 2024-05-09 PROCEDURE — 99309 SBSQ NF CARE MODERATE MDM 30: CPT | Performed by: NURSE PRACTITIONER

## 2024-05-10 NOTE — PROGRESS NOTES
[facility]  [address]   [address]  [place], [address] [phone]    Progress Note  Code [facility] 32    Patient Location     [facility]    Reason for visit     [facility] Monthly Visit      Patient’s care was coordinated with nursing facility staff. Recent vitals, labs and updated medications were reviewed on [facility] system of facility.     Problem List Items Addressed This Visit       Dysphagia - Primary     With known history  Remains of pureed diet with nectar thick liquids  Tolerating current diet   Dependent for feedings   Maintain aspiration precaution   Speech therapy as needed         Physical deconditioning     Multifactorial in the setting of acute and chronic medical conditions   Patient with known history of cerebral palsy  Noted UE/LE contractures and rigidity  Bed bound   Continue restorative services   Continue to implement fall and safety precaution   Continue with pressure offloading and assist  Encourage adequate oral hydration and nutrition, continue with daily multivitamins and supplements   Continue with 24-7 supportive             Cerebral palsy (HCC)     Skyler lift   Recliner bound  Mute   On puree texture and nectar consistency   +UE LE contractures   Dependent for all ADLs/IADLs  Incontinent of B/B   Continue 24/7 supportive care          Essential hypertension     BP acceptable   Pt is not on any antihypertensive at this time   Monitor Bps          GERD (gastroesophageal reflux disease)     Stable  Continue Lansoprazole 30 mg daily          Ambulatory dysfunction     Bed bound. Skyler lift to recliner   Continue fall and safety precaution   Dependent for ADLs/IADLs  Continue with supportive care   PT/OT/SL/SW/DT/ following          HPI     Patient is a 58-yr old female seen and examined for acute and chronic medical conditions. At the time of my evaluation patient appears comfortable and is in no acute distress. Pt is non verbal. Collaborated care  with nursing staff. Pt is lying in recliner. Pt is afebrile. HD stable. Incontinent. Overall pt appears comfortable and is in no acute distress. Remains with chronic pedal edema. Pt consuming more than 76% for all 3-meals. Sleeping with no difficulties. Bed bound. Requires full assistance with ADLs/IADLs. Incontinent of bowel and bladder. No other concerns or issues at this time.     Review of Systems   Constitutional:  Negative for chills and fever.   HENT:  Negative for congestion, nosebleeds and trouble swallowing.    Respiratory:  Negative for cough, shortness of breath and wheezing.    Cardiovascular:  Positive for leg swelling.   Gastrointestinal: Negative.    Genitourinary:  Negative for hematuria.   Musculoskeletal:  Positive for gait problem.   Skin:  Negative for color change.   Neurological:  Positive for weakness. Negative for tremors and seizures.   Psychiatric/Behavioral:  Negative for agitation and sleep disturbance.      Past Medical History:   Diagnosis Date    Arthritis     Blood pressure instability UNKNOWN    Bowel obstruction (HCC)     intestine    Bradycardia     Cerebral palsy (HCC)     Chronic edema     Closed fracture of coccyx ([facility])     DVT (deep venous thrombosis) ([facility])     US Doppler done 10/2015 and 3/2016    GERD (gastroesophageal reflux disease)     History of Doppler ultrasound     of UE and LE; 10/2015 and 3/2016 DVT    History of echocardiogram 03/2016    EF 55%. Trace mitral and tricuspid regurgitation. PA systolic pressure 33. Echo Doppler 2/2017- EF 60%.     History of EKG 10/20/2016    Normal sinus rhythm with sinus arrhythmia. Abnormal ECG     History of Holter monitoring 11/2017    14 DAY MONITOR Baseline rhythm was of sinus origin with rare APCs and VPCs and a few baseline motion artifact. NO OTHER ARRHYTHMIAS. No symptoms was reported in the attached diary.     History of Holter monitoring 10/2016    Sinus rhythm. There were a few episodes of asymptomatic sinus arrhythmia.  There was sinus bradycardia at rate as low as 47 bpm. There were rare single PACs and PVCs.     Hypertension     Hyperthyroidism     Intellectual disability     Kidney stone     Lymphedema     Pneumonia     Stroke (HCC)     UNCERTAIN OF TYPE/NON VERBAL    Tachycardia     Thyroid disease     Urinary incontinence     Urinary tract infection        Past Surgical History:   Procedure Laterality Date    ABDOMINAL HYSTERECTOMY      COLONOSCOPY      EYE SURGERY      HYSTERECTOMY      HYSTERECTOMY      IR DRAINAGE TUBE PLACEMENT  6/12/2020    SMALL INTESTINE SURGERY      US GUIDED VASCULAR ACCESS  1/18/2017     Social History     Tobacco Use   Smoking Status Never   Smokeless Tobacco Never     Family History   Problem Relation Age of Onset    Cancer Father     Colon cancer Father     Asthma Mother     Stroke Mother     WESTLEY disease Mother     Arthritis Mother     Diabetes type II Mother     Mitral valve prolapse Mother     Irritable bowel syndrome Mother     Heart disease Mother       Allergies   Allergen Reactions    Bethanechol      Reaction Date: 10Dec2007; SEIZURES      Metoclopramide      Reaction Date: 10Dec2007; SEIZURES      Sulfamethoxazole-Trimethoprim      Reaction Date: 10Dec2007; SEIZURES      Fentanyl Other (See Comments)     Unknown, mother does not want pt to be given it     Updated list was reviewed in [facility] system of facility.     VS  /52, P 64, R 18, T 97.2, O2 92% RA, W 97.4 lb        Physical Exam  Vitals and nursing note reviewed.   Constitutional:       General: She is not in acute distress.     Appearance: She is ill-appearing (chronically).   HENT:      Head: Normocephalic and atraumatic.      Nose: Nose normal.      Mouth/Throat:      Mouth: Mucous membranes are moist.   Eyes:      General:         Right eye: No discharge.         Left eye: No discharge.   Cardiovascular:      Rate and Rhythm: Normal rate and regular rhythm.      Pulses: Normal pulses.      Heart sounds: Normal heart  sounds.   Pulmonary:      Effort: Pulmonary effort is normal. No respiratory distress.      Breath sounds: Normal breath sounds. No wheezing or rales.   Abdominal:      General: Bowel sounds are normal. There is no distension.      Palpations: Abdomen is soft.      Tenderness: There is no abdominal tenderness. There is no guarding.   Genitourinary:     Comments: Incontinent   Musculoskeletal:      Cervical back: Normal range of motion and neck supple. No rigidity or tenderness.      Right lower leg: Edema (chronic) present.      Left lower leg: Edema (chronic) present.   Skin:     General: Skin is warm.      Coloration: Skin is not jaundiced.      Findings: No bruising, erythema or rash.   Neurological:      General: No focal deficit present.      Mental Status: She is alert. Mental status is at baseline.      Motor: Weakness present.      Gait: Gait abnormal.      Comments: Alert to self     Medications reviewed     Recent labs were reviewed from [facility]  3/8/24  WBC 4.5, hgb 11.7, hct 37, pl 263    Additional Notes:     This note was electronically signed by JONATHAN Piña

## 2024-05-22 NOTE — ASSESSMENT & PLAN NOTE
Skyler lift   Recliner bound  Mute   On puree texture and nectar consistency   +UE LE contractures   Dependent for all ADLs/IADLs  Incontinent of B/B   Continue 24/7 supportive care

## 2024-05-28 PROBLEM — N30.00 ACUTE CYSTITIS WITHOUT HEMATURIA: Status: RESOLVED | Noted: 2024-04-17 | Resolved: 2024-05-28

## 2024-06-11 ENCOUNTER — NURSING HOME VISIT (OUTPATIENT)
Dept: GERIATRICS | Facility: OTHER | Age: 59
End: 2024-06-11
Payer: MEDICARE

## 2024-06-11 DIAGNOSIS — H57.89 IRRITATION OF RIGHT EYE: Primary | ICD-10-CM

## 2024-06-11 PROCEDURE — 99309 SBSQ NF CARE MODERATE MDM 30: CPT | Performed by: NURSE PRACTITIONER

## 2024-06-11 NOTE — PROGRESS NOTES
[address] [address]  [phone]  [facility]  POS [address]  PROGRESS NOTE        NAME: Elly Gonzalez  AGE: 58 y.o. SEX: female  :  1965  DATE OF ENCOUNTER: 2024    Chief Complaint   Patient seen and examined for follow up s/p ED visit.    History of Present Illness     [facility] and examined today for follow-up by request of nursing for right redness. Upon examination, the patient is sitting in her bed, alert, cooperative, and in no acute distress.      The patient is unable to provide reliable history r/t severe cognitive delay.    Per nursing they report that patient has had noted eye redness to sclera x approximately 1 week without any exudate noted.  They report that patient has a habit of rubbing at her eyes.  I will order Pataday 0.01% eyedrops to be instilled 1 time daily in AM x 10 days.    The following portions of the patient's history were reviewed and updated as appropriate: allergies, current medications, past family history, past medical history, past social history, past surgical history and problem list.    Review of Systems     Review of Systems   Unable to perform ROS: Patient nonverbal   Constitutional:  Positive for fatigue. Negative for chills and fever.   HENT:  Positive for trouble swallowing. Negative for ear pain and sore throat.    Eyes:  Positive for redness. Negative for pain and visual disturbance.   Respiratory:  Negative for cough and shortness of breath.    Cardiovascular:  Negative for chest pain and palpitations.   Gastrointestinal:  Negative for abdominal pain and vomiting.   Genitourinary:  Negative for dysuria and hematuria.   Musculoskeletal:  Positive for gait problem. Negative for arthralgias and back pain.   Skin:  Negative for color change and rash.   Neurological:  Positive for speech difficulty and weakness. Negative for seizures and syncope.    Psychiatric/Behavioral:  Positive for behavioral problems and confusion.    All other systems reviewed and are negative.       History     Past Medical History:   Diagnosis Date    Arthritis     Blood pressure instability UNKNOWN    Bowel obstruction (HCC)     intestine    Bradycardia     Cerebral palsy (HCC)     Chronic edema     Closed fracture of coccyx (HCC)     DVT (deep venous thrombosis) ([facility])     US Doppler done 10/2015 and 3/2016    GERD (gastroesophageal reflux disease)     History of Doppler ultrasound     of UE and LE; 10/2015 and 3/2016 DVT    History of echocardiogram 03/2016    EF 55%. Trace mitral and tricuspid regurgitation. PA systolic pressure 33. Echo Doppler 2/2017- EF 60%.     History of EKG 10/20/2016    Normal sinus rhythm with sinus arrhythmia. Abnormal ECG     History of Holter monitoring 11/2017    14 DAY MONITOR Baseline rhythm was of sinus origin with rare APCs and VPCs and a few baseline motion artifact. NO OTHER ARRHYTHMIAS. No symptoms was reported in the attached diary.     History of Holter monitoring 10/2016    Sinus rhythm. There were a few episodes of asymptomatic sinus arrhythmia. There was sinus bradycardia at rate as low as 47 bpm. There were rare single PACs and PVCs.     Hypertension     Hyperthyroidism     Intellectual disability     Kidney stone     Lymphedema     Pneumonia     Stroke (HCC)     UNCERTAIN OF TYPE/NON VERBAL    Tachycardia     Thyroid disease     Urinary incontinence     Urinary tract infection      Past Surgical History:   Procedure Laterality Date    ABDOMINAL HYSTERECTOMY      COLONOSCOPY      EYE SURGERY      HYSTERECTOMY      HYSTERECTOMY      IR DRAINAGE TUBE PLACEMENT  6/12/2020    SMALL INTESTINE SURGERY      US GUIDED VASCULAR ACCESS  1/18/2017     Family History   Problem Relation Age of Onset    Cancer Father     Colon cancer Father     Asthma Mother     Stroke Mother     WESTLEY disease Mother     Arthritis Mother     Diabetes type II Mother      Mitral valve prolapse Mother     Irritable bowel syndrome Mother     Heart disease Mother      Social History     Socioeconomic History    Marital status: Single     Spouse name: Not on file    Number of children: 0    Years of education: Not on file    Highest education level: Not on file   Occupational History    Occupation: Disabled   Tobacco Use    Smoking status: Never    Smokeless tobacco: Never   Vaping Use    Vaping status: Never Used   Substance and Sexual Activity    Alcohol use: No    Drug use: No    Sexual activity: Never   Other Topics Concern    Not on file   Social History Narrative    · Most recent tobacco use screenin2020      · Do you currently or have you served in the [employer]:   No      · Were you activated, into active duty, as a member of the [employer] or as a Reservist:   No       · Live alone or with others:   with others      · Number of children:   0      · Siblings:   yes        · Caffeine intake:   Occasional      · Diet:   Specific      · Exercise level:   Moderate      · Family history of heart disease:   No      · Overweight:   No      · Obese:   No      · Advance directive:   No      · Blood Transfusion:   No       · High cholesterol:   No      · High blood pressure:   No      · Diabetes:   No      · General stress level:   Low      · Guns present in home:   No      · Seat belts used routinely:   Yes      · Seat belt/car seat used routinely:   Yes      · Do you have regular medical check ups:   Yes      Social Determinants of Health     Financial Resource Strain: Low Risk  (2023)    Received from [facility]    Overall Financial Resource Strain (CARDIA)     Difficulty of Paying Living Expenses: Not hard at all   Food Insecurity: No Food Insecurity (2023)    Received from [facility]    Hunger Vital Sign     Worried About Running Out of Food in the Last Year: Never true     Ran Out of Food in the Last Year: Never  true   Transportation Needs: No Transportation Needs (11/24/2023)    Received from [facility]    PRAPARE - Transportation     Lack of Transportation (Medical): No     Lack of Transportation (Non-Medical): No   Physical Activity: Not on file   Stress: Not on file   Social Connections: Not on file   Intimate Partner Violence: Patient Declined (11/24/2023)    Received from [facility]    Humiliation, Afraid, Rape, and Kick questionnaire     Fear of Current or Ex-Partner: Patient declined     Emotionally Abused: Patient declined     Physically Abused: Patient declined     Sexually Abused: Patient declined   Housing Stability: Low Risk  (11/24/2023)    Received from [facility]    Housing Stability Vital Sign     Unable to Pay for Housing in the Last Year: No     Number of Places Lived in the Last Year: 1     In the last 12 months, was there a time when you did not have a steady place to sleep or slept in a shelter (including now)?: No     Allergies   Allergen Reactions    Bethanechol      Reaction Date: 10Dec2007; SEIZURES      Metoclopramide      Reaction Date: 10Dec2007; SEIZURES      Sulfamethoxazole-Trimethoprim      Reaction Date: 10Dec2007; SEIZURES      Fentanyl Other (See Comments)     Unknown, mother does not want pt to be given it       Objective     Vital Signs  BP: 119/61       HR:68 T:97.9    RR:18 O2Sat:96% W:97.4  Physical Exam  Vitals reviewed.   HENT:      Head: Normocephalic and atraumatic.   Eyes:      General:         Right eye: No discharge.         Left eye: No discharge.      Comments: Right eye redness noted   Cardiovascular:      Rate and Rhythm: Normal rate and regular rhythm.      Heart sounds: Normal heart sounds.   Pulmonary:      Breath sounds: Normal breath sounds. No wheezing, rhonchi or rales.   Abdominal:      General: Bowel sounds are normal. There is no distension.      Palpations: Abdomen is soft.      Tenderness: There is no  "abdominal tenderness.   Musculoskeletal:      Right lower leg: Edema present.      Left lower leg: Edema present.      Comments: Contractures of all extremities   Neurological:      Mental Status: She is alert. Mental status is at baseline. She is disoriented.      Motor: Weakness present.      Gait: Gait abnormal.   Psychiatric:         Mood and Affect: Mood normal.        Pertinent Laboratory/Diagnostic Studies have been independently reviewed.      Current Medications     Current Medications Reviewed and updated in Nursing Home EMR.    Assessment and Plan     Irritation of right eye  Patient noted with eye redness of sclera  No exudate noted  Left eye appears normal  Will start Pataday 0.01% to be instilled one time daily x 10 days  Continue to monitor for acute changes in condition    Portions of this note may have been written with use of the previous note summary. Assessment and Plan of care have been reviewed and updated as appropriate.  In addition, voice-recognition software may have been used in the preparation of this document. Occasional wrong word or \"sound-alike\" substitutions may have occurred due to the inherent limitations of voice recognition software. Interpretation should be guided by context.      Nelia CASTILLO  Geriatric Medicine  06/11/2024           "

## 2024-06-12 ENCOUNTER — TELEPHONE (OUTPATIENT)
Dept: OTHER | Facility: OTHER | Age: 59
End: 2024-06-12

## 2024-06-13 ENCOUNTER — NURSING HOME VISIT (OUTPATIENT)
Dept: GERIATRICS | Facility: OTHER | Age: 59
End: 2024-06-13
Payer: MEDICARE

## 2024-06-13 DIAGNOSIS — R11.14 BILIOUS VOMITING WITH NAUSEA: Primary | ICD-10-CM

## 2024-06-13 DIAGNOSIS — K21.9 GASTROESOPHAGEAL REFLUX DISEASE, UNSPECIFIED WHETHER ESOPHAGITIS PRESENT: ICD-10-CM

## 2024-06-13 DIAGNOSIS — R13.10 DYSPHAGIA, UNSPECIFIED TYPE: ICD-10-CM

## 2024-06-13 DIAGNOSIS — Z91.89 AT HIGH RISK FOR ASPIRATION: ICD-10-CM

## 2024-06-13 PROCEDURE — 99310 SBSQ NF CARE HIGH MDM 45: CPT | Performed by: NURSE PRACTITIONER

## 2024-06-13 NOTE — PROGRESS NOTES
[address] [address]  [phone]  [facility]  POS 32  PROGRESS NOTE        NAME: Elly Gonzalez  AGE: 58 y.o. SEX: female  :  1965  DATE OF ENCOUNTER: 2024    Chief Complaint   Patient seen and examined for c/o vomiting.    History of Present Illness     Elly Gonzalez is a patient of [facility] is being seen and examined today for follow-up by request of nursing for right redness. Upon examination, the patient is sitting in her alexa chair, alert, cooperative, and in no acute distress.      The patient is unable to provide reliable history r/t severe cognitive delay.    Per nursing they report that patient had several episodes of emesis with bilious food approx 5 yesterday afternoon and evening.  Per nursing staff patient's mother came in to visit patient and took her outside.  She was also observed feeding her [employer] milkshake/frosty.  Patient was then brought back inside by patient's mother who reported that patient had vomited and had some abnormal movements.  Patient's mother was educated on aspiration risks, as she has been many times in the past.  Patient's mother has signed a form as she chooses to continue to bring outside food in despite recommended diet by  and is aware of aspiration risks and accepts these risks.  Patient had further episodes last evening, approx 4 more. Patient has been ordered Zofran 4 mg po Q 8hrs prn. Patient has not had any further episodes of emesis today and per nursing aide would only accept some foods.   We will continue to monitor patient.    The following portions of the patient's history were reviewed and updated as appropriate: allergies, current medications, past family history, past medical history, past social history, past surgical history and problem list.    Review of Systems     Review of Systems   Unable to perform ROS: Patient nonverbal   Constitutional:  Positive for fatigue. Negative for  chills and fever.   HENT:  Positive for trouble swallowing. Negative for ear pain and sore throat.    Eyes:  Negative for pain, redness and visual disturbance.   Respiratory:  Negative for cough and shortness of breath.    Cardiovascular:  Negative for chest pain and palpitations.   Gastrointestinal:  Negative for abdominal pain and vomiting.   Genitourinary:  Negative for dysuria and hematuria.   Musculoskeletal:  Positive for gait problem. Negative for arthralgias and back pain.   Skin:  Negative for color change and rash.   Neurological:  Positive for speech difficulty and weakness. Negative for seizures and syncope.   Psychiatric/Behavioral:  Positive for behavioral problems and confusion.    All other systems reviewed and are negative.       History     Past Medical History:   Diagnosis Date    Arthritis     Blood pressure instability UNKNOWN    Bowel obstruction (HCC)     intestine    Bradycardia     Cerebral palsy ([facility])     Chronic edema     Closed fracture of coccyx ([facility])     DVT (deep venous thrombosis) ([facility])     US Doppler done 10/2015 and 3/2016    GERD (gastroesophageal reflux disease)     History of Doppler ultrasound     of UE and LE; 10/2015 and 3/2016 DVT    History of echocardiogram 03/2016    EF 55%. Trace mitral and tricuspid regurgitation. PA systolic pressure 33. Echo Doppler 2/2017- EF 60%.     History of EKG 10/20/2016    Normal sinus rhythm with sinus arrhythmia. Abnormal ECG     History of Holter monitoring 11/2017    14 DAY MONITOR Baseline rhythm was of sinus origin with rare APCs and VPCs and a few baseline motion artifact. NO OTHER ARRHYTHMIAS. No symptoms was reported in the attached diary.     History of Holter monitoring 10/2016    Sinus rhythm. There were a few episodes of asymptomatic sinus arrhythmia. There was sinus bradycardia at rate as low as 47 bpm. There were rare single PACs and PVCs.     Hypertension     Hyperthyroidism     Intellectual disability     Kidney stone      Lymphedema     Pneumonia     Stroke (HCC)     UNCERTAIN OF TYPE/NON VERBAL    Tachycardia     Thyroid disease     Urinary incontinence     Urinary tract infection      Past Surgical History:   Procedure Laterality Date    ABDOMINAL HYSTERECTOMY      COLONOSCOPY      EYE SURGERY      HYSTERECTOMY      HYSTERECTOMY      IR DRAINAGE TUBE PLACEMENT  2020    SMALL INTESTINE SURGERY      US GUIDED VASCULAR ACCESS  2017     Family History   Problem Relation Age of Onset    Cancer Father     Colon cancer Father     Asthma Mother     Stroke Mother     WESTLEY disease Mother     Arthritis Mother     Diabetes type II Mother     Mitral valve prolapse Mother     Irritable bowel syndrome Mother     Heart disease Mother      Social History     Socioeconomic History    Marital status: Single     Spouse name: Not on file    Number of children: 0    Years of education: Not on file    Highest education level: Not on file   Occupational History    Occupation: Disabled   Tobacco Use    Smoking status: Never    Smokeless tobacco: Never   Vaping Use    Vaping status: Never Used   Substance and Sexual Activity    Alcohol use: No    Drug use: No    Sexual activity: Never   Other Topics Concern    Not on file   Social History Narrative    · Most recent tobacco use screenin2020      · Do you currently or have you served in the  [employer]:   No      · Were you activated, into active duty, as a member of the [employer] or as a Reservist:   No       · Live alone or with others:   with others      · Number of children:   0      · Siblings:   yes        · Caffeine intake:   Occasional      · Diet:   Specific      · Exercise level:   Moderate      · Family history of heart disease:   No      · Overweight:   No      · Obese:   No      · Advance directive:   No      · Blood Transfusion:   No       · High cholesterol:   No      · High blood pressure:   No      · Diabetes:   No      · General stress level:   Low      · Guns  present in home:   No      · Seat belts used routinely:   Yes      · Seat belt/car seat used routinely:   Yes      · Do you have regular medical check ups:   Yes      Social Determinants of Health     Financial Resource Strain: Low Risk  (11/24/2023)    Received from [facility]    Overall Financial Resource Strain (CARDIA)     Difficulty of Paying Living Expenses: Not hard at all   Food Insecurity: No Food Insecurity (11/24/2023)    Received from [facility]    Hunger Vital Sign     Worried About Running Out of Food in the Last Year: Never true     Ran Out of Food in the Last Year: Never true   Transportation Needs: No Transportation Needs (11/24/2023)    Received from [facility]    PRAPARE - Transportation     Lack of Transportation (Medical): No     Lack of Transportation (Non-Medical): No   Physical Activity: Not on file   Stress: Not on file   Social Connections: Not on file   Intimate Partner Violence: Patient Declined (11/24/2023)    Received from [facility]    Humiliation, Afraid, Rape, and Kick questionnaire     Fear of Current or Ex-Partner: Patient declined     Emotionally Abused: Patient declined     Physically Abused: Patient declined     Sexually Abused: Patient declined   Housing Stability: Low Risk  (11/24/2023)    Received from [facility]    Housing Stability Vital Sign     Unable to Pay for Housing in the Last Year: No     Number of Places Lived in the Last Year: 1     In the last 12 months, was there a time when you did not have a steady place to sleep or slept in a shelter (including now)?: No     Allergies   Allergen Reactions    Bethanechol      Reaction Date: 10Dec2007; SEIZURES      Metoclopramide      Reaction Date: 10Dec2007; SEIZURES      Sulfamethoxazole-Trimethoprim      Reaction Date: 10Dec2007; SEIZURES      Fentanyl Other (See Comments)     Unknown, mother does not want pt to be given it        Objective     Vital Signs  BP: 119/61       HR:68 T:97.9    RR:18 O2Sat:96% W:97.4  Physical Exam  Vitals reviewed.   HENT:      Head: Normocephalic and atraumatic.   Eyes:      General:         Right eye: No discharge.         Left eye: No discharge.   Cardiovascular:      Rate and Rhythm: Normal rate and regular rhythm.      Heart sounds: Normal heart sounds.   Pulmonary:      Breath sounds: Normal breath sounds. No wheezing, rhonchi or rales.   Abdominal:      General: Bowel sounds are normal. There is no distension.      Palpations: Abdomen is soft.      Tenderness: There is no abdominal tenderness.   Musculoskeletal:      Right lower leg: Edema present.      Left lower leg: Edema present.      Comments: Contractures of all extremities   Neurological:      Mental Status: She is alert. Mental status is at baseline. She is disoriented.      Motor: Weakness present.      Gait: Gait abnormal.   Psychiatric:         Mood and Affect: Mood normal.        Pertinent Laboratory/Diagnostic Studies have been independently reviewed.    Current Medications     Current Medications Reviewed and updated in Nursing Home EMR.    Assessment and Plan     Bilious vomiting with nausea  Per nursing staff patient's mother came in to visit patient and took her outside.  She was also observed feeding patient [employer] milkshake/frosty.    Patient was then brought back inside by patient's mother who reported that patient had vomited and had some abnormal movements.    Patient's mother was educated on aspiration risks, as she has been many times in the past.  Patient's mother has signed a form as she chooses to continue to bring outside food in despite recommended diet by ST and is aware of aspiration risks and accepts these risks.    Patient had further episodes last evening, approx 4 more.   Patient has been ordered Zofran 4 mg po Q 8hrs prn.   Patient has not had any further episodes of emesis today and per nursing aide would only  "accept some foods.  Continue to monitor patient for acute changes in condition.    Dysphagia  Patient with history of dysphagia  Maintain aspiration risks  Remains on pureed diet with nectar thick liquids as recommended by ST  Patient is dependent on feedings by staff  Continue to monitor for acute changes in condition    GERD (gastroesophageal reflux disease)  Continue Lansoprazole daily 30 min prior to meals  Avoid citrus, spicy, caffeine, chocolate, and trigger foods  Avoid eating/drinking 1 hour prior to laying down  Continue to monitor for acute changes in condition     At high risk for aspiration  Patient is on pureed diet with nectar thick liquids  Patient's mother continues to feed patient ice cream/frosty and other outside food  Patient is at high risk for aspiration   Continue to monitor for acute changes in condition      Portions of this note may have been written with use of the previous note summary. Assessment and Plan of care have been reviewed and updated as appropriate.  In addition, voice-recognition software may have been used in the preparation of this document. Occasional wrong word or \"sound-alike\" substitutions may have occurred due to the inherent limitations of voice recognition software. Interpretation should be guided by context.      Nelia CASTILLO  Geriatric Medicine  06/13/2024           "

## 2024-06-13 NOTE — TELEPHONE ENCOUNTER
Marjorie YOUNGBLOOD from [facility]. Reporting that pt has been vomiting up mucus for the past hour.Please f/u and advise. Thank you in advance  .

## 2024-06-16 PROBLEM — H57.89 IRRITATION OF RIGHT EYE: Status: ACTIVE | Noted: 2024-06-11

## 2024-06-16 PROBLEM — R11.14 BILIOUS VOMITING WITH NAUSEA: Status: ACTIVE | Noted: 2024-06-13

## 2024-06-16 NOTE — ASSESSMENT & PLAN NOTE
Patient with history of dysphagia  Maintain aspiration risks  Remains on pureed diet with nectar thick liquids as recommended by ST  Patient is dependent on feedings by staff  Continue to monitor for acute changes in condition

## 2024-06-16 NOTE — ASSESSMENT & PLAN NOTE
Patient noted with eye redness of sclera  No exudate noted  Left eye appears normal  Will start Pataday 0.01% to be instilled one time daily x 10 days  Continue to monitor for acute changes in condition

## 2024-06-16 NOTE — ASSESSMENT & PLAN NOTE
Per nursing staff patient's mother came in to visit patient and took her outside.  She was also observed feeding patient [employer] milkshake/frosty.    Patient was then brought back inside by patient's mother who reported that patient had vomited and had some abnormal movements.    Patient's mother was educated on aspiration risks, as she has been many times in the past.  Patient's mother has signed a form as she chooses to continue to bring outside food in despite recommended diet by ST and is aware of aspiration risks and accepts these risks.    Patient had further episodes last evening, approx 4 more.   Patient has been ordered Zofran 4 mg po Q 8hrs prn.   Patient has not had any further episodes of emesis today and per nursing aide would only accept some foods.  Continue to monitor patient for acute changes in condition.

## 2024-06-16 NOTE — ASSESSMENT & PLAN NOTE
Patient is on pureed diet with nectar thick liquids  Patient's mother continues to feed patient ice cream/frosty and other outside food despite repeated education  Continue to encourage compliance  Patient is at high risk for aspiration   Continue to monitor for acute changes in condition

## 2024-06-16 NOTE — ASSESSMENT & PLAN NOTE
Continue Lansoprazole daily 30 min prior to meals  Avoid citrus, spicy, caffeine, chocolate, and trigger foods  Avoid eating/drinking 1 hour prior to laying down  Continue to monitor for acute changes in condition

## 2024-07-18 ENCOUNTER — NURSING HOME VISIT (OUTPATIENT)
Dept: GERIATRICS | Facility: OTHER | Age: 59
End: 2024-07-18
Payer: MEDICARE

## 2024-07-18 VITALS
RESPIRATION RATE: 18 BRPM | SYSTOLIC BLOOD PRESSURE: 121 MMHG | WEIGHT: 102.6 LBS | OXYGEN SATURATION: 96 % | HEART RATE: 62 BPM | DIASTOLIC BLOOD PRESSURE: 79 MMHG | TEMPERATURE: 97.5 F | BODY MASS INDEX: 20.04 KG/M2

## 2024-07-18 DIAGNOSIS — E03.9 HYPOTHYROIDISM, UNSPECIFIED TYPE: Primary | ICD-10-CM

## 2024-07-18 PROCEDURE — 99309 SBSQ NF CARE MODERATE MDM 30: CPT | Performed by: INTERNAL MEDICINE

## 2024-07-18 NOTE — ASSESSMENT & PLAN NOTE
Continue levothyroxine.  Lab Results   Component Value Date    TSH 0.61 03/03/2023      Repeat TSH with next blood draw

## 2024-07-18 NOTE — PROGRESS NOTES
[facility]  [address]   [address]  [place], [place], [address] [phone]    Progress Note  Code [facility] 32    Patient Location     [facility]    Reason for visit     Cerebral palsy, hypertension, GERD, chronic constipation, vitamin D deficiency    Patient’s care was coordinated with nursing facility staff. Recent vitals, labs and updated medications were reviewed on [facility] system of facility.     Problem List Items Addressed This Visit          Endocrine    Hypothyroidism - Primary     Continue levothyroxine.  Lab Results   Component Value Date    TSH 0.61 03/03/2023      Repeat TSH with next blood draw            Vitamin D deficiency:  Continue vitamin D supplements     Hypertension:  Blood pressure stable without any antihypertensive medications.       GERD:  Stable on lansoprazole     Chronic constipation:  Continue Lubiprostone capsule, senna and Colace     Dysphagia:  Continue modified diet per speech therapist recommendation.  Patient remains at high risk for aspiration.  Discontinue scheduled guaifenesin as patient does not continue to have any chest congestion or coughing episodes .       Cerebral palsy:  Continue supportive treatment.  Patient is receiving strengthening exercises for lower extremity contractures     Weight loss:  Patient had weight loss of around 11 pounds during the last 6 months.  Encourage p.o. intake.  Follow-up with dietitian    Vitamin D deficiency:  Continue vitamin D supplements        HPI       Patient is being seen for follow-up visit today.  She is unable to provide any history.  Care coordinated with the nursing staff.  No active issues have been reported.  No visible signs of discomfort or distress.  No reports of any fever or chills dyspnea or chest congestion.  Patient had a significant weight loss of around 11.4 pounds during the last 6 months.  She has been followed by dietitian.  P.o. intake is appx 50%.  Patient was  recommended to be on puréed  and nectar thick consistency diet due to risk of aspiration however at times mother brings food from outside.  Attempted to call mother, could not reach at this time      Review of Systems   Reason unable to perform ROS: Due to cerebral palsy and intellectual disability.   Constitutional:  Positive for unexpected weight change (Weight loss of 11.4 pounds during the last 6 months). Negative for chills and fever.   Respiratory:  Negative for shortness of breath, wheezing and stridor.    Cardiovascular:  Negative for chest pain and leg swelling.   Gastrointestinal:  Negative for abdominal distention and vomiting.   Genitourinary:  Negative for hematuria.   Musculoskeletal:  Positive for gait problem.   Neurological:  Positive for weakness. Negative for seizures and syncope.   Psychiatric/Behavioral:  Positive for behavioral problems and confusion.        Past Medical History:   Diagnosis Date    Arthritis     Blood pressure instability UNKNOWN    Bowel obstruction ([facility])     intestine    Bradycardia     Cerebral palsy ([facility])     Chronic edema     Closed fracture of coccyx ([facility])     DVT (deep venous thrombosis) ([facility])     US Doppler done 10/2015 and 3/2016    GERD (gastroesophageal reflux disease)     History of Doppler ultrasound     of UE and LE; 10/2015 and 3/2016 DVT    History of echocardiogram 03/2016    EF 55%. Trace mitral and tricuspid regurgitation. PA systolic pressure 33. Echo Doppler 2/2017- EF 60%.     History of EKG 10/20/2016    Normal sinus rhythm with sinus arrhythmia. Abnormal ECG     History of Holter monitoring 11/2017    14 DAY MONITOR Baseline rhythm was of sinus origin with rare APCs and VPCs and a few baseline motion artifact. NO OTHER ARRHYTHMIAS. No symptoms was reported in the attached diary.     History of Holter monitoring 10/2016    Sinus rhythm. There were a few episodes of asymptomatic sinus arrhythmia. There was sinus bradycardia at rate as low as 47 bpm. There  were rare single PACs and PVCs.     Hypertension     Hyperthyroidism     Intellectual disability     Kidney stone     Lymphedema     Pneumonia     Stroke (HCC)     UNCERTAIN OF TYPE/NON VERBAL    Tachycardia     Thyroid disease     Urinary incontinence     Urinary tract infection        Past Surgical History:   Procedure Laterality Date    ABDOMINAL HYSTERECTOMY      COLONOSCOPY      EYE SURGERY      HYSTERECTOMY      HYSTERECTOMY      IR DRAINAGE TUBE PLACEMENT  6/12/2020    SMALL INTESTINE SURGERY      US GUIDED VASCULAR ACCESS  1/18/2017       Social History     Tobacco Use   Smoking Status Never   Smokeless Tobacco Never       Family History   Problem Relation Age of Onset    Cancer Father     Colon cancer Father     Asthma Mother     Stroke Mother     WESTLEY disease Mother     Arthritis Mother     Diabetes type II Mother     Mitral valve prolapse Mother     Irritable bowel syndrome Mother     Heart disease Mother         Allergies   Allergen Reactions    Bethanechol      Reaction Date: 10Dec2007; SEIZURES      Metoclopramide      Reaction Date: 10Dec2007; SEIZURES      Sulfamethoxazole-Trimethoprim      Reaction Date: 10Dec2007; SEIZURES      Fentanyl Other (See Comments)     Unknown, mother does not want pt to be given it         Current Outpatient Medications:     albuterol (2.5 mg/3 mL) 0.083 % nebulizer solution, Take 2.5 mg by nebulization every 6 (six) hours as needed, Disp: , Rfl:     aspirin 81 mg chewable tablet, Chew 1 tablet (81 mg total) daily for 7 days, Disp: 7 tablet, Rfl: 0    bisacodyl (DULCOLAX) 10 mg suppository, Insert 10 mg into the rectum daily as needed, Disp: , Rfl:     carboxymethylcellulose 1 % ophthalmic solution, Apply 2 drops to eye in the morning, Disp: , Rfl:     cholecalciferol (VITAMIN D3) 1,000 units tablet, Take 1,000 Units by mouth daily, Disp: , Rfl:     Diclofenac Sodium (VOLTAREN) 1 %, Apply 2 g topically 3 (three) times a day, Disp: , Rfl:     docusate sodium (COLACE) 100 mg  capsule, Take 100 mg by mouth in the morning, Disp: , Rfl:     DULoxetine (CYMBALTA) 30 mg delayed release capsule, Take 30 mg by mouth daily FOR FIBROMYALGIA, Disp: , Rfl:     Garlic 1000 MG CAPS, Take 1 capsule by mouth daily, Disp: , Rfl:     lansoprazole (PREVACID) 30 mg capsule, TAKE 1 CAPSULE BY MOUTH EVERY DAY, Disp: 90 capsule, Rfl: 3    levothyroxine 75 mcg tablet, Take 75 mcg by mouth daily, Disp: , Rfl:     Linzess 145 MCG CAPS, TAKE 1 CAPSULE BY MOUTH EVERY DAY, Disp: 90 capsule, Rfl: 1    magnesium hydroxide (MILK OF MAGNESIA) 400 mg/5 mL oral suspension, Take 30 mL by mouth daily as needed, Disp: , Rfl:     senna (SENOKOT) 8.6 MG tablet, Take 1 tablet by mouth 2 (two) times a day, Disp: , Rfl:     sodium phosphate-biphosphate (FLEET) 7-19 g 118 mL enema, Insert 1 enema into the rectum once as needed, Disp: , Rfl:     Updated list was reviewed in [facility] system of facility.     Vitals:    07/17/24 1321   BP: 121/79   Pulse: 62   Resp: 18   Temp: 97.5 °F (36.4 °C)   SpO2: 96%       Physical Exam  Constitutional:       General: She is not in acute distress.  HENT:      Head: Normocephalic and atraumatic.   Cardiovascular:      Rate and Rhythm: Normal rate and regular rhythm.   Pulmonary:      Breath sounds: No wheezing, rhonchi or rales.   Abdominal:      General: There is no distension.      Palpations: Abdomen is soft.      Tenderness: There is no abdominal tenderness. There is no guarding.   Musculoskeletal:      Cervical back: Neck supple.      Comments: Contractures of all extremities.   chronic edema involving dorsum of feet bilaterally   Skin:     Coloration: Skin is not jaundiced.   Neurological:      Mental Status: She is disoriented.      Cranial Nerves: No cranial nerve deficit.      Motor: Weakness present.   Psychiatric:         Mood and Affect: Mood normal.         Behavior: Behavior normal.         Diagnostic Data:  CBC from 3/8/2024 revealed WBC count of 4.5, hemoglobin 11.7,  "platelet count 263 BMP from 3/7/2024 showed creatinine of 0.7, BUN 22, sodium 144 and potassium of 4.4.    Message left for patient's mother, could not reach it at this time.    Portions of the record may have been created with voice recognition software.  Occasional wrong word or \"sound a like\" substitutions may have occurred due to the inherent limitations of voice recognition software.  Read the chart carefully and recognize, using context, where substitutions have occurred.    This note was electronically signed by Dr. Marilia German   "

## 2024-08-02 ENCOUNTER — NURSING HOME VISIT (OUTPATIENT)
Dept: GERIATRICS | Facility: OTHER | Age: 59
End: 2024-08-02
Payer: MEDICARE

## 2024-08-02 ENCOUNTER — APPOINTMENT (EMERGENCY)
Dept: CT IMAGING | Facility: HOSPITAL | Age: 59
End: 2024-08-02
Payer: MEDICARE

## 2024-08-02 ENCOUNTER — TELEPHONE (OUTPATIENT)
Age: 59
End: 2024-08-02

## 2024-08-02 ENCOUNTER — HOSPITAL ENCOUNTER (EMERGENCY)
Facility: HOSPITAL | Age: 59
Discharge: HOME | End: 2024-08-02
Attending: EMERGENCY MEDICINE
Payer: MEDICARE

## 2024-08-02 VITALS
TEMPERATURE: 98.4 F | SYSTOLIC BLOOD PRESSURE: 125 MMHG | HEART RATE: 66 BPM | OXYGEN SATURATION: 98 % | RESPIRATION RATE: 18 BRPM | DIASTOLIC BLOOD PRESSURE: 71 MMHG

## 2024-08-02 DIAGNOSIS — Z91.81 STATUS POST FALL: Primary | ICD-10-CM

## 2024-08-02 DIAGNOSIS — R13.10 DYSPHAGIA, UNSPECIFIED TYPE: ICD-10-CM

## 2024-08-02 DIAGNOSIS — R26.2 AMBULATORY DYSFUNCTION: ICD-10-CM
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
## 2024-08-02 DIAGNOSIS — G80.9 CEREBRAL PALSY, UNSPECIFIED TYPE (HCC): ICD-10-CM

## 2024-08-02 PROCEDURE — 99284 EMERGENCY DEPT VISIT MOD MDM: CPT

## 2024-08-02 PROCEDURE — 99310 SBSQ NF CARE HIGH MDM 45: CPT | Performed by: NURSE PRACTITIONER

## 2024-08-02 PROCEDURE — 72125 CT NECK SPINE W/O DYE: CPT

## 2024-08-02 PROCEDURE — 99284 EMERGENCY DEPT VISIT MOD MDM: CPT | Performed by: PHYSICIAN ASSISTANT

## 2024-08-02 PROCEDURE — 70486 CT MAXILLOFACIAL W/O DYE: CPT

## 2024-08-02 PROCEDURE — 70450 CT HEAD/BRAIN W/O DYE: CPT

## 2024-08-02 NOTE — PROGRESS NOTES
[facility]  [address] [address]  [phone]  Formerly [facility], [facility] Rehab  POS 32  PROGRESS NOTE        NAME: Elly Gonzalez  AGE: 58 y.o. SEX: female  :  1965  DATE OF ENCOUNTER: 2024    Chief Complaint   Patient seen and examined s/p fall with change in mental status.    History of Present Illness     [facility] and examined today for follow-up by request of nursing for change in mental status. Upon examination, the patient is laying in her bed, alert, cooperative, and in no acute distress.      The patient is unable to provide reliable history r/t severe cognitive delay.    Per nursing they report that patient had an unwitnessed fall this morning.  Staff report that during AM care they noted that patient is more lethargic than usual, not responding to them or opening eyes at commands.  They report that initially report was that patient did not hit her head, but patient roommate who is alert and oriented x 4 reported that patient hit her head upon fall and she was the person who alerted nursing of fall.  Patient takes Aspirin 81 mg po daily.  Patient will be sent out to ED for further evaluation r/t change in mental status.    Per nursing documentation,  At 0645, this writer was called to room 115 due to an unwitnessed fall. Resident in 115 B (Elly Gonzalez) was found on the floor on the left side of the room on her floor mat. Residents bolster was on the floor underneath her. She obtained no injuries. Resident is non-verbal. Resident’s bed was in the lowest position and call bell and safety measures were in place. Resident was assisted x 2 from the floor to the bed safely where skin and pain evaluation was performed VS /80 98.2-70-18 SPO2 97% RA. . Resident positive to signs of pain/discomfort. PRN Tylenol was given for general discomfort related to the fall. No injuries noted at this time. MD TIGIST Finn on  call, instructions agreed to start neuro checks and continue to monitor. Resident’s RP Sherry Thomas (mother) made aware. DON made aware. No further instructions available at this time.     The following portions of the patient's history were reviewed and updated as appropriate: allergies, current medications, past family history, past medical history, past social history, past surgical history and problem list.    Review of Systems     Review of Systems   Unable to perform ROS: Patient nonverbal   Constitutional:  Positive for fatigue. Negative for chills and fever.   HENT:  Positive for trouble swallowing. Negative for ear pain and sore throat.    Eyes:  Positive for visual disturbance. Negative for pain and redness.   Respiratory:  Negative for cough and shortness of breath.    Cardiovascular:  Negative for chest pain and palpitations.   Gastrointestinal:  Negative for abdominal pain and vomiting.   Genitourinary:  Negative for dysuria and hematuria.   Musculoskeletal:  Positive for gait problem. Negative for arthralgias and back pain.   Skin:  Negative for color change and rash.   Neurological:  Positive for speech difficulty and weakness. Negative for seizures and syncope.   Psychiatric/Behavioral:  Positive for confusion. Negative for behavioral problems.    All other systems reviewed and are negative.       History     Past Medical History:   Diagnosis Date    Arthritis     Blood pressure instability UNKNOWN    Bowel obstruction (HCC)     intestine    Bradycardia     Cerebral palsy (HCC)     Chronic edema     Closed fracture of coccyx (HCC)     DVT (deep venous thrombosis) ([facility])     US Doppler done 10/2015 and 3/2016    GERD (gastroesophageal reflux disease)     History of Doppler ultrasound     of UE and LE; 10/2015 and 3/2016 DVT    History of echocardiogram 03/2016    EF 55%. Trace mitral and tricuspid regurgitation. PA systolic pressure 33. Echo Doppler 2/2017- EF 60%.     History of EKG 10/20/2016    Normal  sinus rhythm with sinus arrhythmia. Abnormal ECG     History of Holter monitoring 2017    14 DAY MONITOR Baseline rhythm was of sinus origin with rare APCs and VPCs and a few baseline motion artifact. NO OTHER ARRHYTHMIAS. No symptoms was reported in the attached diary.     History of Holter monitoring 10/2016    Sinus rhythm. There were a few episodes of asymptomatic sinus arrhythmia. There was sinus bradycardia at rate as low as 47 bpm. There were rare single PACs and PVCs.     Hypertension     Hyperthyroidism     Intellectual disability     Kidney stone     Lymphedema     Pneumonia     Stroke (HCC)     UNCERTAIN OF TYPE/NON VERBAL    Tachycardia     Thyroid disease     Urinary incontinence     Urinary tract infection      Past Surgical History:   Procedure Laterality Date    ABDOMINAL HYSTERECTOMY      COLONOSCOPY      EYE SURGERY      HYSTERECTOMY      HYSTERECTOMY      IR DRAINAGE TUBE PLACEMENT  2020    SMALL INTESTINE SURGERY      US GUIDED VASCULAR ACCESS  2017     Family History   Problem Relation Age of Onset    Cancer Father     Colon cancer Father     Asthma Mother     Stroke Mother     WESTLEY disease Mother     Arthritis Mother     Diabetes type II Mother     Mitral valve prolapse Mother     Irritable bowel syndrome Mother     Heart disease Mother      Social History     Socioeconomic History    Marital status: Single     Spouse name: Not on file    Number of children: 0    Years of education: Not on file    Highest education level: Not on file   Occupational History    Occupation: Disabled   Tobacco Use    Smoking status: Never    Smokeless tobacco: Never   Vaping Use    Vaping status: Never Used   Substance and Sexual Activity    Alcohol use: No    Drug use: No    Sexual activity: Never   Other Topics Concern    Not on file   Social History Narrative    · Most recent tobacco use screenin2020      · Do you currently or have you served in the [employer]:   No      · Were you  activated, into active duty, as a member of the [employer] or as a Reservist:   No       · Live alone or with others:   with others      · Number of children:   0      · Siblings:   yes        · Caffeine intake:   Occasional      · Diet:   Specific      · Exercise level:   Moderate      · Family history of heart disease:   No      · Overweight:   No      · Obese:   No      · Advance directive:   No      · Blood Transfusion:   No       · High cholesterol:   No      · High blood pressure:   No      · Diabetes:   No      · General stress level:   Low      · Guns present in home:   No      · Seat belts used routinely:   Yes      · Seat belt/car seat used routinely:   Yes      · Do you have regular medical check ups:   Yes      Social Determinants of Health     Financial Resource Strain: Low Risk  (11/24/2023)    Received from [facility]    Overall Financial Resource Strain (CARDIA)     Difficulty of Paying Living Expenses: Not hard at all   Food Insecurity: No Food Insecurity (11/24/2023)    Received from [facility]    Hunger Vital Sign     Worried About Running Out of Food in the Last Year: Never true     Ran Out of Food in the Last Year: Never true   Transportation Needs: No Transportation Needs (11/24/2023)    Received from [facility]    PRAPARE - Transportation     Lack of Transportation (Medical): No     Lack of Transportation (Non-Medical): No   Physical Activity: Not on file   Stress: Not on file   Social Connections: Not on file   Intimate Partner Violence: Patient Declined (11/24/2023)    Received from [facility]    Humiliation, Afraid, Rape, and Kick questionnaire     Fear of Current or Ex-Partner: Patient declined     Emotionally Abused: Patient declined     Physically Abused: Patient declined     Sexually Abused: Patient declined   Housing Stability: Low Risk  (11/24/2023)    Received from [facility]    Housing  Stability Vital Sign     Unable to Pay for Housing in the Last Year: No     Number of Places Lived in the Last Year: 1     In the last 12 months, was there a time when you did not have a steady place to sleep or slept in a shelter (including now)?: No     Allergies   Allergen Reactions    Bethanechol      Reaction Date: 10Dec2007; SEIZURES      Metoclopramide      Reaction Date: 10Dec2007; SEIZURES      Sulfamethoxazole-Trimethoprim      Reaction Date: 10Dec2007; SEIZURES      Fentanyl Other (See Comments)     Unknown, mother does not want pt to be given it       Objective     Vital Signs  BP: 119/61       HR:68 T:97.9    RR:18 O2Sat:96% W:97.4  Physical Exam  Vitals reviewed.   Constitutional:       General: She is awake.   HENT:      Head: Normocephalic and atraumatic.   Eyes:      Comments: Patient will not open right eye   Cardiovascular:      Rate and Rhythm: Normal rate and regular rhythm.      Heart sounds: Normal heart sounds.   Pulmonary:      Breath sounds: Normal breath sounds. No wheezing, rhonchi or rales.   Abdominal:      General: Bowel sounds are normal. There is no distension.      Palpations: Abdomen is soft.      Tenderness: There is no abdominal tenderness.   Musculoskeletal:      Right lower leg: Edema present.      Left lower leg: Edema present.      Comments: Contractures of all extremities   Skin:     General: Skin is warm and dry.   Neurological:      Mental Status: She is easily aroused. Mental status is at baseline. She is lethargic and disoriented.      Motor: Weakness present.      Gait: Gait abnormal.   Psychiatric:         Mood and Affect: Mood normal.        Pertinent Laboratory/Diagnostic Studies have been independently reviewed.    Current Medications     Current Medications Reviewed and updated in Nursing Home EMR.    Assessment and Plan     Status post fall  Patient with unwitnessed fall this morning with no injuries reported by nursing staff  During AM care, staff noted that  "patient is not \"herself\" with more lethargy and not responding to commands as usual  Patient does have history of cerebral palsy and is non-verbal at baseline, but normally able to follow simple commands by staff  Patient roommate reports that she witnessed fall and patient did hit her head upon fall  Will send patient to ED for further eval    Cerebral palsy (HCC)  Continue supportive treatment  Patient remains dependent on all ADLs by SNF staff  Patient able to follow simple commands at baseline, unable to follow commands this morning per staff report  Will send to ED for further eval, s/p unwitnessed fall    Dysphagia  Patient with history of dysphagia  Maintain aspiration risks  Remains on pureed diet with nectar thick liquids as recommended by ST  Patient is dependent on feedings by staff  Patient mother brings inappropriate food/drink to facility for patient and has been educated on all risks associated with bringing in foods that are not pureed and liquids without nectar thick, she has signed facility document taking responsibility for her continued non-compliance  Continue to monitor for acute changes in condition    Ambulatory dysfunction  Patient is WC dependent at baseline  Patient continues to require 24/7 care in which supportive services and ADLs can be provided  Maintain fall and safety precautions  Continue PT/OT with restorative plan  Patient to be sent to ED for further eval s/p fall this morning r/t change in mental status      Portions of this note may have been written with use of the previous note summary. Assessment and Plan of care have been reviewed and updated as appropriate.  In addition, voice-recognition software may have been used in the preparation of this document. Occasional wrong word or \"sound-alike\" substitutions may have occurred due to the inherent limitations of voice recognition software. Interpretation should be guided by context.      Nelia BAILEYNP  Geriatric " Medicine  8/02/2024

## 2024-08-02 NOTE — ASSESSMENT & PLAN NOTE
Patient is WC dependent at baseline  Patient continues to require 24/7 care in which supportive services and ADLs can be provided  Maintain fall and safety precautions  Continue PT/OT with restorative plan  Patient to be sent to ED for further eval s/p fall this morning r/t change in mental status

## 2024-08-02 NOTE — DISCHARGE INSTRUCTIONS
Please return to the emergency department for worsening symptoms including chest pain, shortness of breath, dizziness, lightheadedness, fever greater than 103, severe pain, inability to walk, fainting episodes, etc..  Please follow-up with your family practice provider as soon as possible.  
17-Oct-2023 16:00

## 2024-08-02 NOTE — ASSESSMENT & PLAN NOTE
Continue supportive treatment  Patient remains dependent on all ADLs by SNF staff  Patient able to follow simple commands at baseline, unable to follow commands this morning per staff report  Will send to ED for further eval, s/p unwitnessed fall

## 2024-08-02 NOTE — ASSESSMENT & PLAN NOTE
Patient with history of dysphagia  Maintain aspiration risks  Remains on pureed diet with nectar thick liquids as recommended by ST  Patient is dependent on feedings by staff  Patient mother brings inappropriate food/drink to facility for patient and has been educated on all risks associated with bringing in foods that are not pureed and liquids without nectar thick, she has signed facility document taking responsibility for her continued non-compliance  Continue to monitor for acute changes in condition

## 2024-08-02 NOTE — ASSESSMENT & PLAN NOTE
"Patient with unwitnessed fall this morning with no injuries reported by nursing staff  During AM care, staff noted that patient is not \"herself\" with more lethargy and not responding to commands as usual  Patient does have history of cerebral palsy and is non-verbal at baseline, but normally able to follow simple commands by staff  Patient roommate reports that she witnessed fall and patient did hit her head upon fall  Will send patient to ED for further eval  "

## 2024-08-04 NOTE — ED PROVIDER NOTES
History  Chief Complaint   Patient presents with    Fall     Patient arrives to the Ed via ems from [facility] with complain of fall some time last night. Per EMS, staff at nursing home states she fell out of bed, they denies any change to baseline mentation, Family wanted her to be seen by a medical provider and cleared. Patient non-verbal      This is a 58-year-old female with past medical history significant for cerebral palsy and intellectual disability presenting to the emergency department today status post witnessed fall.  The patient herself is nonverbal and contracted at baseline.  Per nursing home, the patient had a witnessed fall out of bed without head strike.  She was subsequently placed back in bed.  Staff noted potential facial swelling and sent her to the emergency department for an evaluation.  Per nursing home staff, the patient is at her baseline.  She is not on any anticoagulants or antiplatelets.  The patient herself is nonparticipatory in history or physical examination.  No other complaints at this time.      History provided by:  Patient   used: No    Fall  Mechanism of injury: fall    Injury location:  Face  Incident location:  group home  Time since incident:  1 day  Arrived directly from scene: no    Suspicion of alcohol use: no    Suspicion of drug use: no    Tetanus status:  Unknown  Prior to arrival data:     Patient ambulatory at scene: no      Loss of consciousness: no      Amnesic to event: no    Associated symptoms: no seizures and no vomiting    Risk factors: no anticoagulation therapy        Prior to Admission Medications   Prescriptions Last Dose Informant Patient Reported? Taking?   DULoxetine (CYMBALTA) 30 mg delayed release capsule   Yes No   Sig: Take 30 mg by mouth daily FOR FIBROMYALGIA   Diclofenac Sodium (VOLTAREN) 1 %   Yes No   Sig: Apply 2 g topically 3 (three) times a day   Garlic 1000 MG CAPS  Mother Yes No   Sig: Take 1 capsule by mouth daily    Linzess 145 MCG CAPS   No No   Sig: TAKE 1 CAPSULE BY MOUTH EVERY DAY   albuterol (2.5 mg/3 mL) 0.083 % nebulizer solution   Yes No   Sig: Take 2.5 mg by nebulization every 6 (six) hours as needed   aspirin 81 mg chewable tablet   No No   Sig: Chew 1 tablet (81 mg total) daily for 7 days   bisacodyl (DULCOLAX) 10 mg suppository   Yes No   Sig: Insert 10 mg into the rectum daily as needed   carboxymethylcellulose 1 % ophthalmic solution   Yes No   Sig: Apply 2 drops to eye in the morning   cholecalciferol (VITAMIN D3) 1,000 units tablet   Yes No   Sig: Take 1,000 Units by mouth daily   docusate sodium (COLACE) 100 mg capsule   Yes No   Sig: Take 100 mg by mouth in the morning   lansoprazole (PREVACID) 30 mg capsule   No No   Sig: TAKE 1 CAPSULE BY MOUTH EVERY DAY   levothyroxine 75 mcg tablet   Yes No   Sig: Take 75 mcg by mouth daily   magnesium hydroxide (MILK OF MAGNESIA) 400 mg/5 mL oral suspension   Yes No   Sig: Take 30 mL by mouth daily as needed   senna (SENOKOT) 8.6 MG tablet   Yes No   Sig: Take 1 tablet by mouth 2 (two) times a day   sodium phosphate-biphosphate (FLEET) 7-19 g 118 mL enema   Yes No   Sig: Insert 1 enema into the rectum once as needed      Facility-Administered Medications: None       Past Medical History:   Diagnosis Date    Arthritis     Blood pressure instability UNKNOWN    Bowel obstruction (HCC)     intestine    Bradycardia     Cerebral palsy (HCC)     Chronic edema     Closed fracture of coccyx (HCC)     DVT (deep venous thrombosis) ([facility])     US Doppler done 10/2015 and 3/2016    GERD (gastroesophageal reflux disease)     History of Doppler ultrasound     of UE and LE; 10/2015 and 3/2016 DVT    History of echocardiogram 03/2016    EF 55%. Trace mitral and tricuspid regurgitation. PA systolic pressure 33. Echo Doppler 2/2017- EF 60%.     History of EKG 10/20/2016    Normal sinus rhythm with sinus arrhythmia. Abnormal ECG     History of Holter monitoring 11/2017    14 DAY MONITOR  Baseline rhythm was of sinus origin with rare APCs and VPCs and a few baseline motion artifact. NO OTHER ARRHYTHMIAS. No symptoms was reported in the attached diary.     History of Holter monitoring 10/2016    Sinus rhythm. There were a few episodes of asymptomatic sinus arrhythmia. There was sinus bradycardia at rate as low as 47 bpm. There were rare single PACs and PVCs.     Hypertension     Hyperthyroidism     Intellectual disability     Kidney stone     Lymphedema     Pneumonia     Stroke (HCC)     UNCERTAIN OF TYPE/NON VERBAL    Tachycardia     Thyroid disease     Urinary incontinence     Urinary tract infection        Past Surgical History:   Procedure Laterality Date    ABDOMINAL HYSTERECTOMY      COLONOSCOPY      EYE SURGERY      HYSTERECTOMY      HYSTERECTOMY      IR DRAINAGE TUBE PLACEMENT  6/12/2020    SMALL INTESTINE SURGERY      US GUIDED VASCULAR ACCESS  1/18/2017       Family History   Problem Relation Age of Onset    Cancer Father     Colon cancer Father     Asthma Mother     Stroke Mother     WESTLEY disease Mother     Arthritis Mother     Diabetes type II Mother     Mitral valve prolapse Mother     Irritable bowel syndrome Mother     Heart disease Mother      I have reviewed and agree with the history as documented.    E-Cigarette/Vaping    E-Cigarette Use Never User      E-Cigarette/Vaping Substances     Social History     Tobacco Use    Smoking status: Never    Smokeless tobacco: Never   Vaping Use    Vaping status: Never Used   Substance Use Topics    Alcohol use: No    Drug use: No       Review of Systems   Unable to perform ROS: Dementia   Respiratory:  Negative for choking.    Gastrointestinal:  Negative for vomiting.   Skin:  Negative for wound.   Neurological:  Negative for seizures and facial asymmetry.   Psychiatric/Behavioral:  Positive for confusion.    All other systems reviewed and are negative.      Physical Exam  Physical Exam  Vitals and nursing note reviewed.   Constitutional:        General: She is not in acute distress.     Appearance: Normal appearance. She is normal weight. She is not ill-appearing, toxic-appearing or diaphoretic.      Comments: Frail.  Contracted.   HENT:      Head: Normocephalic and atraumatic.      Right Ear: Tympanic membrane, ear canal and external ear normal. There is no impacted cerumen.      Left Ear: Tympanic membrane, ear canal and external ear normal. There is no impacted cerumen.      Ears:      Comments: No hemotympanum or Rodas sign bilaterally.     Nose: Nose normal. No congestion or rhinorrhea.      Mouth/Throat:      Mouth: Mucous membranes are moist.      Pharynx: No oropharyngeal exudate or posterior oropharyngeal erythema.   Eyes:      General: No scleral icterus.        Right eye: No discharge.         Left eye: No discharge.      Extraocular Movements: Extraocular movements intact.      Pupils: Pupils are equal, round, and reactive to light.      Comments: No raccoon eyes bilaterally.  No noticeable facial swelling on examination.   Cardiovascular:      Rate and Rhythm: Normal rate and regular rhythm.      Pulses: Normal pulses.      Heart sounds: Normal heart sounds. No murmur heard.     No friction rub. No gallop.   Pulmonary:      Effort: Pulmonary effort is normal. No respiratory distress.      Breath sounds: Normal breath sounds. No stridor. No wheezing, rhonchi or rales.   Chest:      Chest wall: No tenderness.   Musculoskeletal:         General: Normal range of motion.      Cervical back: Normal range of motion. No tenderness.      Right lower leg: No edema.      Left lower leg: No edema.   Skin:     General: Skin is warm and dry.      Capillary Refill: Capillary refill takes less than 2 seconds.      Coloration: Skin is not jaundiced or pale.   Neurological:      General: No focal deficit present.      Mental Status: She is alert. Mental status is at baseline.   Psychiatric:         Mood and Affect: Mood normal.         Behavior: Behavior  normal.         Vital Signs  ED Triage Vitals [08/02/24 1138]   Temperature Pulse Respirations Blood Pressure SpO2   98.4 °F (36.9 °C) 65 20 114/62 98 %      Temp Source Heart Rate Source Patient Position - Orthostatic VS BP Location FiO2 (%)   Oral Monitor Lying Right arm --      Pain Score       --           Vitals:    08/02/24 1138 08/02/24 1427   BP: 114/62 125/71   Pulse: 65 66   Patient Position - Orthostatic VS: Lying Lying         Visual Acuity  Visual Acuity      Flowsheet Row Most Recent Value   L Pupil Size (mm) 3   R Pupil Size (mm) 3   L Pupil Shape Round   R Pupil Shape Round            ED Medications  Medications - No data to display    Diagnostic Studies  Results Reviewed       None                   CT spine cervical without contrast   Final Result by Albert Bobo MD (08/02 1308)      No cervical spine fracture or traumatic malalignment.                  Workstation performed: [id number]         CT facial bones without contrast   Final Result by Albert Bobo MD (08/02 1311)      No acute fracture.               Workstation performed: [id number]         CT head without contrast   Final Result by Albert Bobo MD (08/02 1304)      No acute intracranial abnormality.                  Workstation performed: [id number]                    Procedures  Procedures         ED Course                                               Medical Decision Making  58-year-old female presenting to the emergency department today status post witnessed fall.  Per nursing home, the patient is currently at her baseline.  She has baseline cerebral palsy and intellectual disability and is contracted and nonverbal.  Her vital signs are stable.  Afebrile.  The patient has no evidence of basilar skull fracture on physical examination.  I do not appreciate any facial swelling.  CT head, cervical spine, and facial bones without acute traumatic abnormalities.  She is stable for discharge  "at this time.  Strict return precautions were given.  Recommend PCP follow-up as soon as possible. The patient and/or patient's proxy verify their understanding and agree to the plan at this time.  All questions answered to the patient and/or their proxy's satisfaction.  All labs reviewed and utilized in the medical decision making process (if labs were ordered).  Portions of the record may have been created with voice recognition software.  Occasional wrong word or \"sound a like\" substitutions may have occurred due to the inherent limitations of voice recognition software.  Read the chart carefully and recognize, using context, where substitutions have occurred.    I reviewed prior notes.    Problems Addressed:  Fall, initial encounter: undiagnosed new problem with uncertain prognosis    Amount and/or Complexity of Data Reviewed  External Data Reviewed: notes.  Radiology: ordered. Decision-making details documented in ED Course.                 Disposition  Final diagnoses:   Fall, initial encounter     Time reflects when diagnosis was documented in both MDM as applicable and the Disposition within this note       Time User Action Codes Description Comment    8/2/2024  1:23 PM Caleb Fisher Add [W19.XXXA] Fall, initial encounter           ED Disposition       ED Disposition   Discharge    Condition   Stable    Date/Time   Fri Aug 2, 2024  1:23 PM    Comment   Elly Gonzalez discharge to home/self care.                   Follow-up Information       Follow up With Specialties Details Why Contact Info Additional Information    Teodoro Santiago,  Family Medicine Schedule an appointment as soon as possible for a visit   [address] [facility] [address]  [phone]       [facility] Emergency Medicine Go to  If symptoms worsen [address] [address]  [phone] [facility], [address] " [id number]            Discharge Medication List as of 8/2/2024  1:23 PM        CONTINUE these medications which have NOT CHANGED    Details   albuterol (2.5 mg/3 mL) 0.083 % nebulizer solution Take 2.5 mg by nebulization every 6 (six) hours as needed, Historical Med      aspirin 81 mg chewable tablet Chew 1 tablet (81 mg total) daily for 7 days, Starting Wed 9/22/2021, Until Wed 1/3/2024, Normal      bisacodyl (DULCOLAX) 10 mg suppository Insert 10 mg into the rectum daily as needed, Historical Med      carboxymethylcellulose 1 % ophthalmic solution Apply 2 drops to eye in the morning, Historical Med      cholecalciferol (VITAMIN D3) 1,000 units tablet Take 1,000 Units by mouth daily, Historical Med      Diclofenac Sodium (VOLTAREN) 1 % Apply 2 g topically 3 (three) times a day, Historical Med      docusate sodium (COLACE) 100 mg capsule Take 100 mg by mouth in the morning, Historical Med      DULoxetine (CYMBALTA) 30 mg delayed release capsule Take 30 mg by mouth daily FOR FIBROMYALGIA, Historical Med      Garlic 1000 MG CAPS Take 1 capsule by mouth daily, Historical Med      lansoprazole (PREVACID) 30 mg capsule TAKE 1 CAPSULE BY MOUTH EVERY DAY, Normal      levothyroxine 75 mcg tablet Take 75 mcg by mouth daily, Historical Med      Linzess 145 MCG CAPS TAKE 1 CAPSULE BY MOUTH EVERY DAY, Normal      magnesium hydroxide (MILK OF MAGNESIA) 400 mg/5 mL oral suspension Take 30 mL by mouth daily as needed, Historical Med      senna (SENOKOT) 8.6 MG tablet Take 1 tablet by mouth 2 (two) times a day, Historical Med      sodium phosphate-biphosphate (FLEET) 7-19 g 118 mL enema Insert 1 enema into the rectum once as needed, Historical Med             No discharge procedures on file.    PDMP Review       None            ED Provider  Electronically Signed by             Caleb Fisher PA-C  08/04/24 1408

## 2024-08-12 ENCOUNTER — TELEPHONE (OUTPATIENT)
Age: 59
End: 2024-08-12
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
